# Patient Record
Sex: MALE | Race: WHITE | NOT HISPANIC OR LATINO | Employment: OTHER | URBAN - METROPOLITAN AREA
[De-identification: names, ages, dates, MRNs, and addresses within clinical notes are randomized per-mention and may not be internally consistent; named-entity substitution may affect disease eponyms.]

---

## 2017-01-26 LAB
ALBUMIN SERPL-MCNC: 4 G/DL (ref 3.5–4.7)
ALBUMIN/GLOB SERPL: 1.7 {RATIO} (ref 1.1–2.5)
ALP SERPL-CCNC: 70 IU/L (ref 39–117)
ALT SERPL-CCNC: 13 IU/L (ref 0–44)
AST SERPL-CCNC: 21 IU/L (ref 0–40)
BASOPHILS # BLD AUTO: 0 X10E3/UL (ref 0–0.2)
BASOPHILS NFR BLD AUTO: 0 %
BILIRUB SERPL-MCNC: 0.8 MG/DL (ref 0–1.2)
BUN SERPL-MCNC: 16 MG/DL (ref 8–27)
BUN/CREAT SERPL: 21 (ref 10–22)
CALCIUM SERPL-MCNC: 9.1 MG/DL (ref 8.6–10.2)
CHLORIDE SERPL-SCNC: 104 MMOL/L (ref 96–106)
CO2 SERPL-SCNC: 24 MMOL/L (ref 18–29)
CREAT SERPL-MCNC: 0.75 MG/DL (ref 0.76–1.27)
EOSINOPHIL # BLD AUTO: 0.1 X10E3/UL (ref 0–0.4)
EOSINOPHIL NFR BLD AUTO: 2 %
ERYTHROCYTE [DISTWIDTH] IN BLOOD BY AUTOMATED COUNT: 13.6 % (ref 12.3–15.4)
GLOBULIN SER CALC-MCNC: 2.4 G/DL (ref 1.5–4.5)
GLUCOSE SERPL-MCNC: 100 MG/DL (ref 65–99)
HBA1C MFR BLD: 5.8 % (ref 4.8–5.6)
HCT VFR BLD AUTO: 47.4 % (ref 37.5–51)
HGB BLD-MCNC: 15.3 G/DL (ref 12.6–17.7)
IMM GRANULOCYTES # BLD: 0 X10E3/UL (ref 0–0.1)
IMM GRANULOCYTES NFR BLD: 0 %
IMMATURE CELLS  115398: NORMAL
LYMPHOCYTES # BLD AUTO: 1.7 X10E3/UL (ref 0.7–3.1)
LYMPHOCYTES NFR BLD AUTO: 31 %
MCH RBC QN AUTO: 30.5 PG (ref 26.6–33)
MCHC RBC AUTO-ENTMCNC: 32.3 G/DL (ref 31.5–35.7)
MCV RBC AUTO: 94 FL (ref 79–97)
MONOCYTES # BLD AUTO: 0.7 X10E3/UL (ref 0.1–0.9)
MONOCYTES NFR BLD AUTO: 13 %
MORPHOLOGY BLD-IMP: NORMAL
NEUTROPHILS # BLD AUTO: 3 X10E3/UL (ref 1.4–7)
NEUTROPHILS NFR BLD AUTO: 54 %
NRBC BLD AUTO-RTO: NORMAL %
PLATELET # BLD AUTO: 167 X10E3/UL (ref 150–379)
POTASSIUM SERPL-SCNC: 4.3 MMOL/L (ref 3.5–5.2)
PROT SERPL-MCNC: 6.4 G/DL (ref 6–8.5)
RBC # BLD AUTO: 5.02 X10E6/UL (ref 4.14–5.8)
SODIUM SERPL-SCNC: 141 MMOL/L (ref 134–144)
WBC # BLD AUTO: 5.5 X10E3/UL (ref 3.4–10.8)

## 2017-02-06 ENCOUNTER — OFFICE VISIT (OUTPATIENT)
Dept: MEDICAL GROUP | Facility: MEDICAL CENTER | Age: 82
End: 2017-02-06
Payer: MEDICARE

## 2017-02-06 VITALS
HEIGHT: 69 IN | TEMPERATURE: 98.2 F | RESPIRATION RATE: 16 BRPM | HEART RATE: 95 BPM | SYSTOLIC BLOOD PRESSURE: 132 MMHG | DIASTOLIC BLOOD PRESSURE: 76 MMHG | WEIGHT: 157 LBS | BODY MASS INDEX: 23.25 KG/M2 | OXYGEN SATURATION: 93 %

## 2017-02-06 DIAGNOSIS — R73.02 IGT (IMPAIRED GLUCOSE TOLERANCE): ICD-10-CM

## 2017-02-06 DIAGNOSIS — C17.9 ADENOCARCINOMA OF SMALL BOWEL (HCC): ICD-10-CM

## 2017-02-06 DIAGNOSIS — E78.5 DYSLIPIDEMIA: ICD-10-CM

## 2017-02-06 PROCEDURE — 99214 OFFICE O/P EST MOD 30 MIN: CPT | Performed by: INTERNAL MEDICINE

## 2017-02-06 PROCEDURE — G8432 DEP SCR NOT DOC, RNG: HCPCS | Performed by: INTERNAL MEDICINE

## 2017-02-06 PROCEDURE — G8482 FLU IMMUNIZE ORDER/ADMIN: HCPCS | Performed by: INTERNAL MEDICINE

## 2017-02-06 PROCEDURE — 1101F PT FALLS ASSESS-DOCD LE1/YR: CPT | Performed by: INTERNAL MEDICINE

## 2017-02-06 PROCEDURE — 1036F TOBACCO NON-USER: CPT | Performed by: INTERNAL MEDICINE

## 2017-02-06 PROCEDURE — 4040F PNEUMOC VAC/ADMIN/RCVD: CPT | Performed by: INTERNAL MEDICINE

## 2017-02-06 PROCEDURE — G8420 CALC BMI NORM PARAMETERS: HCPCS | Performed by: INTERNAL MEDICINE

## 2017-02-06 ASSESSMENT — PATIENT HEALTH QUESTIONNAIRE - PHQ9: CLINICAL INTERPRETATION OF PHQ2 SCORE: 0

## 2017-02-06 NOTE — MR AVS SNAPSHOT
"        Donato Burciaga   2017 1:20 PM   Office Visit   MRN: [id number]    Department:  [address] [place] [address]   Dept Phone:  [phone]    Description:  Male : 1929   Provider:  Fabiano Burk M.D.           Reason for Visit     Follow-Up Labs      Allergies as of 2017     No Known Allergies      You were diagnosed with     Adenocarcinoma of small bowel (CMS-HCC)   [422439]       IGT (impaired glucose tolerance)   [239029]       Dyslipidemia   [051259]         Vital Signs     Blood Pressure Pulse Temperature Respirations Height Weight    132/76 mmHg 95 36.8 °C (98.2 °F) 16 1.753 m (5' 9\") 71.215 kg (157 lb)    Body Mass Index Oxygen Saturation Smoking Status             23.17 kg/m2 93% Former Smoker         Basic Information     Date Of Birth Sex Race Ethnicity Preferred Language    1929 Male White Non- English      Your appointments     2017  1:00 PM   Established Patient with Fabiano Burk M.D.   [facility] [address]    [address] [place] [address]  [facility] [address]   [phone]           You will be receiving a confirmation call a few days before your appointment from our automated call confirmation system.              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 Review all of your home medications and newly ordered medications with your provider and/or pharmacist. Follow medication instructions as directed by your provider and/or pharmacist. Please keep your medication list with you and share with your provider. Update the information when medications are discontinued, doses are changed, or new medications (including over-the-counter products) are added; and carry medication information at all times in the event of emergency situations     Allergies:  No Known Allergies          Medications  Valid as of: February 06, 2017 -  1:29 PM    Generic Name Brand Name Tablet Size Instructions for use    Ascorbic Acid (Chew Tab) Vitamin C 250 MG Take  by mouth.        Cholecalciferol (Cap) Vitamin D (Cholecalciferol) 1000 UNITS Take  by mouth.        Loratadine (Tab) CLARITIN 10 MG Take 10 mg by mouth every day.        Magnesium   Take  by mouth.        Metoprolol Succinate (TABLET SR 24 HR) TOPROL XL 50 MG Take 1 Tab by mouth every day.        Pravastatin Sodium (Tab) PRAVACHOL 20 MG Take 1 Tab by mouth every day.        Terazosin HCl (Cap) HYTRIN 5 MG Take 1 Cap by mouth every day. TAKE 1 CAP BY MOUTH EVERY DAY.        .                 Medicines prescribed today were sent to:     [id number] - [place], [place] - [address] [place] [address] AT [facility] & [address] [address]    Phone: [phone] Fax: [phone]    Open 24 Hours?: No    [facility] - [facility], [place] - [address] [facility]    [address] [facility] [address]    Phone: [phone] Fax: [phone]    Open 24 Hours?: No      Medication refill instructions:       If your prescription bottle indicates you have  medication refills left, it is not necessary to call your provider’s office. Please contact your pharmacy and they will refill your medication.    If your prescription bottle indicates you do not have any refills left, you may request refills at any time through one of the following ways: The online [employer] system (except Urgent Care), by calling your provider’s office, or by asking your pharmacy to contact your provider’s office with a refill request. Medication refills are processed only during regular business hours and may not be available until the next business day. Your provider may request additional information or to have a follow-up visit with you prior to refilling your medication.   *Please Note: Medication refills are assigned a new Rx number when refilled electronically. Your pharmacy may indicate that no refills were authorized even though a new prescription for the same medication is available at the pharmacy. Please request the medicine by name with the pharmacy before contacting your provider for a refill.        Your To Do List     Future Labs/Procedures Complete By Expires    COMP METABOLIC PANEL  As directed 2/7/2018    HEMOGLOBIN A1C  As directed 2/7/2018    LIPID PROFILE  As directed 2/7/2018         [employer] Status: Patient Declined

## 2017-02-06 NOTE — PROGRESS NOTES
"CC: Multiple issues    HPI:   Donato presents today with the following.    1. Adenocarcinoma of small bowel (CMS-HCC)  Status post resection of the cancer and the ampulla levator doing well from a clinical standpoint. He finished chemoradiation and his weight is stable denying nausea vomiting or abdominal pain.    2. IGT (impaired glucose tolerance)  Blood sugar slightly elevated on lab draw never been diabetic. Denying any symptoms of excessive thirst or urination.    3. Dyslipidemia  Mild cholesterol maintain on a statin. He will be coming due in November. Denies any myalgias.      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 90 Tab 3   • terazosin (HYTRIN) 5 MG Cap Take 1 Cap by mouth every day. TAKE 1 CAP BY MOUTH EVERY DAY. 90 Cap 3   • pravastatin (PRAVACHOL) 20 MG Tab Take 1 Tab by mouth every day. 90 Tab 3   • MAGNESIUM PO Take  by mouth.     • Ascorbic Acid (VITAMIN C) 250 MG Chew Tab Take  by mouth.     • Vitamin D, Cholecalciferol, 1000 UNITS Cap Take  by mouth.     • loratadine (CLARITIN) 10 MG Tab Take 10 mg by mouth every day.       No current facility-administered medications for this visit.         Allergies as of 02/06/2017   • (No Known Allergies)        ROS: As per HPI.    /76 mmHg  Pulse 95  Temp(Src) 36.8 °C (98.2 °F)  Resp 16  Ht 1.753 m (5' 9\")  Wt 71.215 kg (157 lb)  BMI 23.17 kg/m2  SpO2 93%    Physical Exam:  Gen:         Alert and oriented, " No apparent distress.  Neck:        No Lymphadenopathy or Bruits.  Lungs:     Clear to auscultation bilaterally  CV:          Regular rate and rhythm. No murmurs, rubs or gallops.               Ext:          No clubbing, cyanosis, edema.      Assessment and Plan.   87 y.o. male with the following issues.    1. Adenocarcinoma of small bowel (CMS-HCC)  Clinically doing well no further treatment plan will manage expectantly.    2. IGT (impaired glucose tolerance)  Discussion about diet recheck 8 months.  - HEMOGLOBIN A1C; Future    3. Dyslipidemia  Lipids currently well controlled. Discussed continued diet and exercise recheck 6 months to 1 year.  - COMP METABOLIC PANEL; Future  - LIPID PROFILE; Future

## 2017-03-09 ENCOUNTER — APPOINTMENT (OUTPATIENT)
Dept: RADIATION ONCOLOGY | Facility: MEDICAL CENTER | Age: 82
End: 2017-03-09
Attending: RADIOLOGY
Payer: MEDICARE

## 2017-04-12 ENCOUNTER — TELEPHONE (OUTPATIENT)
Dept: OTHER | Facility: MEDICAL CENTER | Age: 82
End: 2017-04-12

## 2017-04-12 NOTE — PROGRESS NOTES
Pt returns call. He does not have any upcoming appointments at [facility] soon and would prefer his cancer treatment summary / survivorship care plan mailed to him.

## 2017-04-19 ENCOUNTER — PATIENT OUTREACH (OUTPATIENT)
Dept: OTHER | Facility: MEDICAL CENTER | Age: 82
End: 2017-04-19

## 2017-04-19 NOTE — PROGRESS NOTES
Telephoned patient to review cancer treatment summary/survivorship care plan which was sent via mail. Pt stated he received it and read through it. He denied questions, needs, or barriers at this time.

## 2017-11-03 ENCOUNTER — TELEPHONE (OUTPATIENT)
Dept: MEDICAL GROUP | Facility: MEDICAL CENTER | Age: 82
End: 2017-11-03

## 2017-11-03 LAB
ALBUMIN SERPL-MCNC: 3.8 G/DL (ref 3.5–4.7)
ALBUMIN/GLOB SERPL: 1.8 {RATIO} (ref 1.2–2.2)
ALP SERPL-CCNC: 145 IU/L (ref 39–117)
ALT SERPL-CCNC: 100 IU/L (ref 0–44)
AST SERPL-CCNC: 46 IU/L (ref 0–40)
BILIRUB SERPL-MCNC: 0.9 MG/DL (ref 0–1.2)
BUN SERPL-MCNC: 17 MG/DL (ref 8–27)
BUN/CREAT SERPL: 25 (ref 10–24)
CALCIUM SERPL-MCNC: 9 MG/DL (ref 8.6–10.2)
CHLORIDE SERPL-SCNC: 104 MMOL/L (ref 96–106)
CHOLEST SERPL-MCNC: 147 MG/DL (ref 100–199)
CO2 SERPL-SCNC: 22 MMOL/L (ref 18–29)
COMMENT 011824: NORMAL
CREAT SERPL-MCNC: 0.69 MG/DL (ref 0.76–1.27)
GFR SERPLBLD CREATININE-BSD FMLA CKD-EPI: 85 ML/MIN/1.73
GFR SERPLBLD CREATININE-BSD FMLA CKD-EPI: 98 ML/MIN/1.73
GLOBULIN SER CALC-MCNC: 2.1 G/DL (ref 1.5–4.5)
GLUCOSE SERPL-MCNC: 104 MG/DL (ref 65–99)
HBA1C MFR BLD: 5.5 % (ref 4.8–5.6)
HDLC SERPL-MCNC: 58 MG/DL
LDLC SERPL CALC-MCNC: 76 MG/DL (ref 0–99)
POTASSIUM SERPL-SCNC: 4.3 MMOL/L (ref 3.5–5.2)
PROT SERPL-MCNC: 5.9 G/DL (ref 6–8.5)
SODIUM SERPL-SCNC: 141 MMOL/L (ref 134–144)
TRIGL SERPL-MCNC: 66 MG/DL (ref 0–149)
VLDLC SERPL CALC-MCNC: 13 MG/DL (ref 5–40)

## 2017-11-03 NOTE — TELEPHONE ENCOUNTER
Notes Recorded by Fabiano Burk M.D. on 11/3/2017 at 6:24 AM PDT  Need follow up to discuss liver tests

## 2017-11-09 ENCOUNTER — OFFICE VISIT (OUTPATIENT)
Dept: MEDICAL GROUP | Facility: MEDICAL CENTER | Age: 82
End: 2017-11-09
Payer: MEDICARE

## 2017-11-09 VITALS
TEMPERATURE: 98.4 F | OXYGEN SATURATION: 98 % | HEIGHT: 69 IN | WEIGHT: 144 LBS | RESPIRATION RATE: 16 BRPM | BODY MASS INDEX: 21.33 KG/M2 | HEART RATE: 93 BPM | DIASTOLIC BLOOD PRESSURE: 70 MMHG | SYSTOLIC BLOOD PRESSURE: 120 MMHG

## 2017-11-09 DIAGNOSIS — Z00.00 MEDICARE ANNUAL WELLNESS VISIT, SUBSEQUENT: ICD-10-CM

## 2017-11-09 DIAGNOSIS — E78.5 DYSLIPIDEMIA: ICD-10-CM

## 2017-11-09 DIAGNOSIS — R73.02 IGT (IMPAIRED GLUCOSE TOLERANCE): ICD-10-CM

## 2017-11-09 DIAGNOSIS — R79.89 LFTS ABNORMAL: ICD-10-CM

## 2017-11-09 DIAGNOSIS — I10 ESSENTIAL HYPERTENSION: ICD-10-CM

## 2017-11-09 DIAGNOSIS — Z85.09 HISTORY OF GALLBLADDER CANCER: ICD-10-CM

## 2017-11-09 DIAGNOSIS — N40.1 BENIGN NON-NODULAR PROSTATIC HYPERPLASIA WITH LOWER URINARY TRACT SYMPTOMS: ICD-10-CM

## 2017-11-09 DIAGNOSIS — R09.82 PND (POST-NASAL DRIP): ICD-10-CM

## 2017-11-09 DIAGNOSIS — G25.0 BENIGN ESSENTIAL TREMOR: ICD-10-CM

## 2017-11-09 PROCEDURE — 99214 OFFICE O/P EST MOD 30 MIN: CPT | Mod: 25 | Performed by: INTERNAL MEDICINE

## 2017-11-09 RX ORDER — FLUTICASONE PROPIONATE 50 MCG
1 SPRAY, SUSPENSION (ML) NASAL DAILY
Qty: 16 G | Refills: 11 | Status: SHIPPED | OUTPATIENT
Start: 2017-11-09 | End: 2017-11-27 | Stop reason: SDUPTHER

## 2017-11-09 ASSESSMENT — PATIENT HEALTH QUESTIONNAIRE - PHQ9: CLINICAL INTERPRETATION OF PHQ2 SCORE: 0

## 2017-11-09 NOTE — PROGRESS NOTES
CC: Follow-up blood work due for wellness examination.    HPI:   Donato presents today with the following.    1. Medicare annual wellness visit, subsequent  Screenings performed below information given on advanced directives    2. LFTs abnormal  Blood work recently obtained showing elevation of AST and ALT with values of 45 and 100 respectively also elevating bilirubin. Denies any abdominal pain or jaundice no loss of appetite.    3. History of gallbladder cancer  Lab work in the setting of history of cancer of the biliary tree status post radiation. He is not being followed by oncology no recent imaging.    4. PND (post-nasal drip)  He is complaining of postnasal drip no fevers or chills earaches or sore throat. Tends to be related to cold weather being outside. His report.    5. Dyslipidemia  Maintain on statin without myalgias    6. Essential hypertension  Blood pressures well controlled denying any chest pain or shortness breath no edema    7. Benign non-nodular prostatic hyperplasia with lower urinary tract symptoms  Maintain on medication symptoms stable.    8. IGT (impaired glucose tolerance)  Blood sugars checked and stable.    9. Benign essential tremor  Denying any advancement of symptoms.      Depression Screening    Little interest or pleasure in doing things?  0 - not at all  Feeling down, depressed , or hopeless? 0 - not at all  Patient Health Questionnaire Score: 0     If depressive symptoms identified deferred to follow up visit unless specifically addressed in assessment and plan.    Interpretation of PHQ-9 Total Score   Score Severity   1-4 No Depression   5-9 Mild Depression   10-14 Moderate Depression   15-19 Moderately Severe Depression   20-27 Severe Depression    Screening for Cognitive Impairment    Three Minute Recall (apple, watch, jad)  3/3    Draw clock face with all 12 numbers set to the hand to show 10 minutes past 11 o'clock       Cognitive concerns identified deferred for follow up  unless specifically addressed in assessment and plan.    Fall Risk Assessment    Has the patient had two or more falls in the last year or any fall with injury in the last year?  No    Safety Assessment    Throw rugs on floor.  No  Handrails on all stairs.  No  Good lighting in all hallways.  Yes  Difficulty hearing.  No  Patient counseled about all safety risks that were identified.    Functional Assessment ADLs    Are there any barriers preventing you from cooking for yourself or meeting nutritional needs?  No.    Are there any barriers preventing you from driving safely or obtaining transportation?  No.    Are there any barriers preventing you from using a telephone or calling for help?  No.    Are there any barriers preventing you from shopping?  No.    Are there any barriers preventing you from taking care of your own finances?  No.    Are there any barriers preventing you from managing your medications?  No.    Are currently engaging any exercise or physical activity?  Yes.       Health Maintenance Summary                Annual Wellness Visit Next Due 11/10/2018      Done 11/9/2017 Visit Dx: Medicare annual wellness visit, subsequent     Patient has more history with this topic...          Patient Care Team:  Fabiano Burk M.D. as PCP - General (Internal Medicine)  Fabiano Warner M.D. as [facility] (Oncology)  Dain Barrett M.D. as Consulting Physician (Radiation Therapy)  [place] M.D. as Consulting Physician (Gastroenterology)      Patient Active Problem List    Diagnosis Date Noted   • History of gallbladder cancer 11/09/2017   • Benign essential tremor 11/03/2016   • Anemia 02/09/2016   • IGT (impaired glucose tolerance) 11/30/2015   • Benign non-nodular prostatic hyperplasia with lower urinary tract symptoms 08/03/2015   • Dyslipidemia 01/26/2015   • Essential hypertension 01/26/2015       Current Outpatient Prescriptions   Medication Sig Dispense Refill   • fluticasone  "(FLONASE) 50 MCG/ACT nasal spray Spray 1 Spray in nose every day. 16 g 11   • terazosin (HYTRIN) 5 MG Cap TAKE 1 CAPSULE DAILY 90 Cap 3   • metoprolol SR (TOPROL XL) 50 MG TABLET SR 24 HR Take 1 Tab by mouth every day. 90 Tab 3   • pravastatin (PRAVACHOL) 20 MG Tab Take 1 Tab by mouth every day. 90 Tab 3   • MAGNESIUM PO Take  by mouth.     • Ascorbic Acid (VITAMIN C) 250 MG Chew Tab Take  by mouth.     • Vitamin D, Cholecalciferol, 1000 UNITS Cap Take  by mouth.     • loratadine (CLARITIN) 10 MG Tab Take 10 mg by mouth every day.       No current facility-administered medications for this visit.          Allergies as of 11/09/2017   • (No Known Allergies)        ROS: As per HPI.    /70   Pulse 93   Temp 36.9 °C (98.4 °F)   Resp 16   Ht 1.753 m (5' 9\")   Wt 65.3 kg (144 lb)   SpO2 98%   BMI 21.27 kg/m²     Physical Exam:  Gen:         Alert and oriented, No apparent distress.  Neck:        No Lymphadenopathy or Bruits.  Lungs:     Clear to auscultation bilaterally  CV:          Regular rate and rhythm. No murmurs, rubs or gallops.  Abd:         Soft non tender, non distended. Normal active bowel sounds.  No  Hepatosplenomegaly, No pulsatile masses.                   Ext:          No clubbing, cyanosis, edema.      Assessment and Plan.   88 y.o. male with the following issues.    1. Medicare annual wellness visit, subsequent  Discussed healthy lifestyle habits as well as screening regimens.  - Annual Wellness Visit - Includes PPPS Subsequent ()    2. LFTs abnormal  Certainly concerns given his previous cancer have written for ultrasound as well as referral back to gastroenterology.  - US-LIVER AND BILIARY TREE; Future  - REFERRAL TO GASTROENTEROLOGY    3. History of gallbladder cancer  As a #2.  - US-LIVER AND BILIARY TREE; Future  - REFERRAL TO GASTROENTEROLOGY    4. PND (post-nasal drip)  Placing on nasal steroid follow up if not improving  - fluticasone (FLONASE) 50 MCG/ACT nasal spray; Spray 1 " Spray in nose every day.  Dispense: 16 g; Refill: 11    5. Dyslipidemia  Lipids currently well controlled. Discussed continued diet and exercise recheck 6 months to 1 year.  - Annual Wellness Visit - Includes PPPS Subsequent ()    6. Essential hypertension  Currently well controlled, Discuss diet, exercise and salt restriction.  - Annual Wellness Visit - Includes PPPS Subsequent ()    7. Benign non-nodular prostatic hyperplasia with lower urinary tract symptoms  Stable no change therapy  - Annual Wellness Visit - Includes PPPS Subsequent ()    8. IGT (impaired glucose tolerance)  Blood sugars adequately controlled no change therapy  - Annual Wellness Visit - Includes PPPS Subsequent ()    9. Benign essential tremor  Clinically stable.  - Annual Wellness Visit - Includes PPPS Subsequent ()

## 2017-11-27 DIAGNOSIS — R09.82 PND (POST-NASAL DRIP): ICD-10-CM

## 2017-11-27 RX ORDER — FLUTICASONE PROPIONATE 50 MCG
1 SPRAY, SUSPENSION (ML) NASAL DAILY
Qty: 16 G | Refills: 11 | Status: SHIPPED | OUTPATIENT
Start: 2017-11-27 | End: 2017-12-01 | Stop reason: SDUPTHER

## 2017-11-28 ENCOUNTER — HOSPITAL ENCOUNTER (OUTPATIENT)
Dept: RADIOLOGY | Facility: MEDICAL CENTER | Age: 82
End: 2017-11-28
Attending: INTERNAL MEDICINE
Payer: MEDICARE

## 2017-11-28 DIAGNOSIS — Z85.09 HISTORY OF GALLBLADDER CANCER: ICD-10-CM

## 2017-11-28 DIAGNOSIS — R79.89 LFTS ABNORMAL: ICD-10-CM

## 2017-11-28 PROCEDURE — 76705 ECHO EXAM OF ABDOMEN: CPT

## 2017-12-01 DIAGNOSIS — R09.82 PND (POST-NASAL DRIP): ICD-10-CM

## 2017-12-01 RX ORDER — FLUTICASONE PROPIONATE 50 MCG
1 SPRAY, SUSPENSION (ML) NASAL DAILY
Qty: 3 BOTTLE | Refills: 3 | Status: SHIPPED | OUTPATIENT
Start: 2017-12-01 | End: 2018-04-16

## 2017-12-01 NOTE — TELEPHONE ENCOUNTER
Was the patient seen in the last year in this department? Yes     Does patient have an active prescription for medications requested? Yes     Received Request Via: Pharmacy     Requesting mail order instead of local

## 2017-12-06 ENCOUNTER — OFFICE VISIT (OUTPATIENT)
Dept: MEDICAL GROUP | Facility: MEDICAL CENTER | Age: 82
End: 2017-12-06
Payer: MEDICARE

## 2017-12-06 VITALS
DIASTOLIC BLOOD PRESSURE: 62 MMHG | BODY MASS INDEX: 21 KG/M2 | HEIGHT: 69 IN | WEIGHT: 141.8 LBS | OXYGEN SATURATION: 95 % | TEMPERATURE: 97.8 F | HEART RATE: 66 BPM | RESPIRATION RATE: 16 BRPM | SYSTOLIC BLOOD PRESSURE: 142 MMHG

## 2017-12-06 DIAGNOSIS — E78.2 MIXED HYPERLIPIDEMIA: ICD-10-CM

## 2017-12-06 DIAGNOSIS — I71.40 ABDOMINAL AORTIC ANEURYSM (AAA) WITHOUT RUPTURE (HCC): ICD-10-CM

## 2017-12-06 DIAGNOSIS — R79.89 LFTS ABNORMAL: ICD-10-CM

## 2017-12-06 DIAGNOSIS — I10 ESSENTIAL HYPERTENSION: ICD-10-CM

## 2017-12-06 PROCEDURE — 99214 OFFICE O/P EST MOD 30 MIN: CPT | Performed by: INTERNAL MEDICINE

## 2017-12-06 RX ORDER — PRAVASTATIN SODIUM 20 MG
20 TABLET ORAL
Qty: 90 TAB | Refills: 3 | Status: ON HOLD | OUTPATIENT
Start: 2017-12-06 | End: 2018-02-22

## 2017-12-06 RX ORDER — LOSARTAN POTASSIUM 25 MG/1
25 TABLET ORAL DAILY
Qty: 90 TAB | Refills: 3 | Status: ON HOLD | OUTPATIENT
Start: 2017-12-06 | End: 2018-02-22

## 2017-12-07 NOTE — PROGRESS NOTES
CC: Follow-up multiple issues    HPI:   Donato presents today with the following.    1. Abdominal aortic aneurysm (AAA) without rupture (CMS-HCC)  Presents after an ultrasound of the abdomen revealed an aortic aneurysm measured 2 x 8 x 3.3. Denies any abdominal pain and felt to be incidental finding.    2. Essential hypertension  Does have elevated blood pressure monitors at home he is at borderline control. Denying any chest pain or shortness of breath no dizziness.    3. LFTs abnormal  Ultrasound obtained for abnormal LFTs. Did show biliary stent in place. He does have decreased appetite and his weight is persistently going down. He is dropped over 10 pounds in the last year. Denies inon nausea vomiting but again decreased appetite      Patient Active Problem List    Diagnosis Date Noted   • LFTs abnormal 12/06/2017   • History of gallbladder cancer 11/09/2017   • Benign essential tremor 11/03/2016   • Anemia 02/09/2016   • IGT (impaired glucose tolerance) 11/30/2015   • Benign non-nodular prostatic hyperplasia with lower urinary tract symptoms 08/03/2015   • Dyslipidemia 01/26/2015   • Essential hypertension 01/26/2015       Current Outpatient Prescriptions   Medication Sig Dispense Refill   • losartan (COZAAR) 25 MG Tab Take 1 Tab by mouth every day. 90 Tab 3   • pravastatin (PRAVACHOL) 20 MG Tab Take 1 Tab by mouth every day. 90 Tab 3   • fluticasone (FLONASE) 50 MCG/ACT nasal spray Spray 1 Spray in nose every day. 3 Bottle 3   • TOPROL XL 50 MG TABLET SR 24 HR TAKE 1 TABLET DAILY 90 Tab 3   • terazosin (HYTRIN) 5 MG Cap TAKE 1 CAPSULE DAILY 90 Cap 3   • MAGNESIUM PO Take  by mouth.     • Vitamin D, Cholecalciferol, 1000 UNITS Cap Take  by mouth.     • loratadine (CLARITIN) 10 MG Tab Take 10 mg by mouth every day.       No current facility-administered medications for this visit.          Allergies as of 12/06/2017   • (No Known Allergies)        ROS: As per HPI.    /62   Pulse 66   Temp 36.6 °C (97.8  "°F)   Resp 16   Ht 1.753 m (5' 9\")   Wt 64.3 kg (141 lb 12.8 oz)   SpO2 95%   BMI 20.94 kg/m²     Physical Exam:  Gen:         Alert and oriented, No apparent distress.  Neck:        No Lymphadenopathy or Bruits.  Lungs:     Clear to auscultation bilaterally  CV:          Regular rate and rhythm. No murmurs, rubs or gallops.               Ext:          No clubbing, cyanosis, edema.      Assessment and Plan.   88 y.o. male with the following issues.    1. Abdominal aortic aneurysm (AAA) without rupture (CMS-HCC)  Unlikely to be life limiting process have referred to vascular medicine for surveillance.  - REFERRAL TO VASCULAR MEDICINE Reason for Referral? Established Vascular Disease    2. Essential hypertension  Adding losartan low-dose cautious about side effects.  - losartan (COZAAR) 25 MG Tab; Take 1 Tab by mouth every day.  Dispense: 90 Tab; Refill: 3    3. LFTs abnormal  Concerns about weight loss and abnormal imaging he's already been referred to gastroenterology is not yet called for an appointment. He will do so today.      "

## 2018-01-17 ENCOUNTER — OFFICE VISIT (OUTPATIENT)
Dept: MEDICAL GROUP | Facility: MEDICAL CENTER | Age: 83
End: 2018-01-17
Payer: MEDICARE

## 2018-01-17 VITALS
OXYGEN SATURATION: 97 % | DIASTOLIC BLOOD PRESSURE: 60 MMHG | TEMPERATURE: 97.8 F | WEIGHT: 139 LBS | BODY MASS INDEX: 20.59 KG/M2 | HEART RATE: 74 BPM | HEIGHT: 69 IN | RESPIRATION RATE: 16 BRPM | SYSTOLIC BLOOD PRESSURE: 122 MMHG

## 2018-01-17 DIAGNOSIS — R63.4 WEIGHT LOSS: ICD-10-CM

## 2018-01-17 PROCEDURE — 99213 OFFICE O/P EST LOW 20 MIN: CPT | Performed by: INTERNAL MEDICINE

## 2018-01-17 RX ORDER — MEGESTROL ACETATE 20 MG/1
20 TABLET ORAL DAILY
Qty: 30 TAB | Refills: 3 | Status: ON HOLD | OUTPATIENT
Start: 2018-01-17 | End: 2018-02-22

## 2018-01-17 NOTE — PROGRESS NOTES
"CC: Follow-up weight loss    HPI:   Donato presents today with the following.    1. Weight loss  Presents having lost another 2 pounds in the last one month. He reports his appetite is fine wife has other ideas. She appears to want to say something but is hesitant to do so in his presence. He denies any major complaints with the reports he is seeming more fatigue. He does finally have an appointment with gastroenterology in the coming weeks. He denies any abdominal pain did have one episode of gagging but nothing persistent per his report.      Patient Active Problem List    Diagnosis Date Noted   • LFTs abnormal 12/06/2017   • History of gallbladder cancer 11/09/2017   • Benign essential tremor 11/03/2016   • Anemia 02/09/2016   • IGT (impaired glucose tolerance) 11/30/2015   • Benign non-nodular prostatic hyperplasia with lower urinary tract symptoms 08/03/2015   • Dyslipidemia 01/26/2015   • Essential hypertension 01/26/2015       Current Outpatient Prescriptions   Medication Sig Dispense Refill   • megestrol (MEGACE) 20 MG Tab Take 1 Tab by mouth every day. 30 Tab 3   • losartan (COZAAR) 25 MG Tab Take 1 Tab by mouth every day. 90 Tab 3   • pravastatin (PRAVACHOL) 20 MG Tab Take 1 Tab by mouth every day. 90 Tab 3   • fluticasone (FLONASE) 50 MCG/ACT nasal spray Spray 1 Spray in nose every day. 3 Bottle 3   • TOPROL XL 50 MG TABLET SR 24 HR TAKE 1 TABLET DAILY 90 Tab 3   • terazosin (HYTRIN) 5 MG Cap TAKE 1 CAPSULE DAILY 90 Cap 3   • MAGNESIUM PO Take  by mouth.     • Vitamin D, Cholecalciferol, 1000 UNITS Cap Take  by mouth.     • loratadine (CLARITIN) 10 MG Tab Take 10 mg by mouth every day.       No current facility-administered medications for this visit.          Allergies as of 01/17/2018   • (No Known Allergies)        ROS: As per HPI.    /60   Pulse 74   Temp 36.6 °C (97.8 °F)   Resp 16   Ht 1.753 m (5' 9\")   Wt 63 kg (139 lb)   SpO2 97%   BMI 20.53 kg/m²     Physical Exam:  Gen:         Alert " and oriented, No apparent distress.  Neck:        No Lymphadenopathy or Bruits.  Lungs:     Clear to auscultation bilaterally  CV:          Regular rate and rhythm. No murmurs, rubs or gallops.               Ext:          No clubbing, cyanosis, edema.      Assessment and Plan.   88 y.o. male with the following issues.    1. Weight loss  There is something more to restore than he lets on to wife will come back for a visit states she wants to talk about some of his issues. Have started on appetite stimulant will see back after he seeing gastroenterology.  - megestrol (MEGACE) 20 MG Tab; Take 1 Tab by mouth every day.  Dispense: 30 Tab; Refill: 3

## 2018-02-19 ENCOUNTER — HOSPITAL ENCOUNTER (INPATIENT)
Facility: MEDICAL CENTER | Age: 83
LOS: 2 days | DRG: 251 | End: 2018-02-22
Attending: EMERGENCY MEDICINE | Admitting: HOSPITALIST
Payer: MEDICARE

## 2018-02-19 DIAGNOSIS — I21.4 NSTEMI (NON-ST ELEVATED MYOCARDIAL INFARCTION) (HCC): ICD-10-CM

## 2018-02-19 DIAGNOSIS — G25.0 BENIGN ESSENTIAL TREMOR: ICD-10-CM

## 2018-02-19 DIAGNOSIS — R55 NEAR SYNCOPE: ICD-10-CM

## 2018-02-19 DIAGNOSIS — Z85.09 HISTORY OF GALLBLADDER CANCER: ICD-10-CM

## 2018-02-19 LAB — EKG IMPRESSION: NORMAL

## 2018-02-19 PROCEDURE — 93005 ELECTROCARDIOGRAM TRACING: CPT | Performed by: EMERGENCY MEDICINE

## 2018-02-19 PROCEDURE — 99285 EMERGENCY DEPT VISIT HI MDM: CPT

## 2018-02-19 PROCEDURE — 36415 COLL VENOUS BLD VENIPUNCTURE: CPT

## 2018-02-19 ASSESSMENT — PAIN SCALES - WONG BAKER: WONGBAKER_NUMERICALRESPONSE: DOESN'T HURT AT ALL

## 2018-02-20 ENCOUNTER — APPOINTMENT (OUTPATIENT)
Dept: RADIOLOGY | Facility: MEDICAL CENTER | Age: 83
DRG: 251 | End: 2018-02-20
Attending: EMERGENCY MEDICINE
Payer: MEDICARE

## 2018-02-20 ENCOUNTER — RESOLUTE PROFESSIONAL BILLING HOSPITAL PROF FEE (OUTPATIENT)
Dept: HOSPITALIST | Facility: MEDICAL CENTER | Age: 83
End: 2018-02-20
Payer: MEDICARE

## 2018-02-20 ENCOUNTER — APPOINTMENT (OUTPATIENT)
Dept: RADIOLOGY | Facility: MEDICAL CENTER | Age: 83
DRG: 251 | End: 2018-02-20
Attending: INTERNAL MEDICINE
Payer: MEDICARE

## 2018-02-20 ENCOUNTER — APPOINTMENT (OUTPATIENT)
Dept: RADIOLOGY | Facility: MEDICAL CENTER | Age: 83
DRG: 251 | End: 2018-02-20
Attending: HOSPITALIST
Payer: MEDICARE

## 2018-02-20 PROBLEM — E86.0 DEHYDRATION: Status: ACTIVE | Noted: 2018-02-20

## 2018-02-20 PROBLEM — R79.89 ELEVATED TROPONIN: Status: ACTIVE | Noted: 2018-02-20

## 2018-02-20 PROBLEM — R55 NEAR SYNCOPE: Status: ACTIVE | Noted: 2018-02-20

## 2018-02-20 LAB
ALBUMIN SERPL BCP-MCNC: 3.4 G/DL (ref 3.2–4.9)
ALBUMIN/GLOB SERPL: 1.5 G/DL
ALP SERPL-CCNC: 240 U/L (ref 30–99)
ALT SERPL-CCNC: 134 U/L (ref 2–50)
ANION GAP SERPL CALC-SCNC: 11 MMOL/L (ref 0–11.9)
ANION GAP SERPL CALC-SCNC: 8 MMOL/L (ref 0–11.9)
APPEARANCE UR: CLEAR
APTT PPP: 123.4 SEC (ref 24.7–36)
AST SERPL-CCNC: 185 U/L (ref 12–45)
BACTERIA #/AREA URNS HPF: NEGATIVE /HPF
BASOPHILS # BLD AUTO: 0.1 % (ref 0–1.8)
BASOPHILS # BLD: 0.01 K/UL (ref 0–0.12)
BILIRUB SERPL-MCNC: 2.7 MG/DL (ref 0.1–1.5)
BILIRUB UR QL STRIP.AUTO: ABNORMAL
BUN SERPL-MCNC: 26 MG/DL (ref 8–22)
BUN SERPL-MCNC: 30 MG/DL (ref 8–22)
CALCIUM SERPL-MCNC: 8.4 MG/DL (ref 8.5–10.5)
CALCIUM SERPL-MCNC: 8.5 MG/DL (ref 8.5–10.5)
CHLORIDE SERPL-SCNC: 109 MMOL/L (ref 96–112)
CHLORIDE SERPL-SCNC: 110 MMOL/L (ref 96–112)
CO2 SERPL-SCNC: 19 MMOL/L (ref 20–33)
CO2 SERPL-SCNC: 20 MMOL/L (ref 20–33)
COLOR UR: ABNORMAL
CREAT SERPL-MCNC: 0.76 MG/DL (ref 0.5–1.4)
CREAT SERPL-MCNC: 0.94 MG/DL (ref 0.5–1.4)
CULTURE IF INDICATED INDCX: YES UA CULTURE
EKG IMPRESSION: NORMAL
EKG IMPRESSION: NORMAL
EOSINOPHIL # BLD AUTO: 0.01 K/UL (ref 0–0.51)
EOSINOPHIL NFR BLD: 0.1 % (ref 0–6.9)
EPI CELLS #/AREA URNS HPF: ABNORMAL /HPF
ERYTHROCYTE [DISTWIDTH] IN BLOOD BY AUTOMATED COUNT: 45.6 FL (ref 35.9–50)
ERYTHROCYTE [DISTWIDTH] IN BLOOD BY AUTOMATED COUNT: 48.6 FL (ref 35.9–50)
FLUAV RNA SPEC QL NAA+PROBE: NEGATIVE
FLUBV RNA SPEC QL NAA+PROBE: NEGATIVE
GLOBULIN SER CALC-MCNC: 2.2 G/DL (ref 1.9–3.5)
GLUCOSE SERPL-MCNC: 133 MG/DL (ref 65–99)
GLUCOSE SERPL-MCNC: 94 MG/DL (ref 65–99)
GLUCOSE UR STRIP.AUTO-MCNC: NEGATIVE MG/DL
HCT VFR BLD AUTO: 32.5 % (ref 42–52)
HCT VFR BLD AUTO: 32.8 % (ref 42–52)
HGB BLD-MCNC: 10.7 G/DL (ref 14–18)
HGB BLD-MCNC: 11.1 G/DL (ref 14–18)
HYALINE CASTS #/AREA URNS LPF: ABNORMAL /LPF
IMM GRANULOCYTES # BLD AUTO: 0.11 K/UL (ref 0–0.11)
IMM GRANULOCYTES NFR BLD AUTO: 1 % (ref 0–0.9)
INR PPP: 1.24 (ref 0.87–1.13)
KETONES UR STRIP.AUTO-MCNC: ABNORMAL MG/DL
LEUKOCYTE ESTERASE UR QL STRIP.AUTO: ABNORMAL
LV EJECT FRACT  99904: 65
LV EJECT FRACT MOD 2C 99903: 68.43
LV EJECT FRACT MOD 4C 99902: 64.24
LV EJECT FRACT MOD BP 99901: 65.99
LYMPHOCYTES # BLD AUTO: 0.14 K/UL (ref 1–4.8)
LYMPHOCYTES NFR BLD: 1.2 % (ref 22–41)
MCH RBC QN AUTO: 30.2 PG (ref 27–33)
MCH RBC QN AUTO: 30.4 PG (ref 27–33)
MCHC RBC AUTO-ENTMCNC: 32.9 G/DL (ref 33.7–35.3)
MCHC RBC AUTO-ENTMCNC: 33.8 G/DL (ref 33.7–35.3)
MCV RBC AUTO: 89.9 FL (ref 81.4–97.8)
MCV RBC AUTO: 91.8 FL (ref 81.4–97.8)
MICRO URNS: ABNORMAL
MONOCYTES # BLD AUTO: 0.5 K/UL (ref 0–0.85)
MONOCYTES NFR BLD AUTO: 4.4 % (ref 0–13.4)
NEUTROPHILS # BLD AUTO: 10.49 K/UL (ref 1.82–7.42)
NEUTROPHILS NFR BLD: 93.2 % (ref 44–72)
NITRITE UR QL STRIP.AUTO: NEGATIVE
NRBC # BLD AUTO: 0 K/UL
NRBC BLD-RTO: 0 /100 WBC
PH UR STRIP.AUTO: 6.5 [PH]
PLATELET # BLD AUTO: 143 K/UL (ref 164–446)
PLATELET # BLD AUTO: 152 K/UL (ref 164–446)
PMV BLD AUTO: 9.5 FL (ref 9–12.9)
PMV BLD AUTO: 9.6 FL (ref 9–12.9)
POTASSIUM SERPL-SCNC: 3.5 MMOL/L (ref 3.6–5.5)
POTASSIUM SERPL-SCNC: 3.7 MMOL/L (ref 3.6–5.5)
PROT SERPL-MCNC: 5.6 G/DL (ref 6–8.2)
PROT UR QL STRIP: NEGATIVE MG/DL
PROTHROMBIN TIME: 15.3 SEC (ref 12–14.6)
RBC # BLD AUTO: 3.54 M/UL (ref 4.7–6.1)
RBC # BLD AUTO: 3.65 M/UL (ref 4.7–6.1)
RBC # URNS HPF: ABNORMAL /HPF
RBC UR QL AUTO: NEGATIVE
RENAL EPI CELLS #/AREA URNS HPF: NEGATIVE /HPF
SODIUM SERPL-SCNC: 138 MMOL/L (ref 135–145)
SODIUM SERPL-SCNC: 139 MMOL/L (ref 135–145)
SP GR UR STRIP.AUTO: 1.02
TROPONIN I SERPL-MCNC: 0.15 NG/ML (ref 0–0.04)
TROPONIN I SERPL-MCNC: 2.35 NG/ML (ref 0–0.04)
TROPONIN I SERPL-MCNC: 3.93 NG/ML (ref 0–0.04)
TROPONIN I SERPL-MCNC: 4.11 NG/ML (ref 0–0.04)
TROPONIN I SERPL-MCNC: 4.77 NG/ML (ref 0–0.04)
TROPONIN I SERPL-MCNC: 4.98 NG/ML (ref 0–0.04)
TROPONIN I SERPL-MCNC: 5.63 NG/ML (ref 0–0.04)
UROBILINOGEN UR STRIP.AUTO-MCNC: 2 MG/DL
WBC # BLD AUTO: 11.3 K/UL (ref 4.8–10.8)
WBC # BLD AUTO: 19.2 K/UL (ref 4.8–10.8)
WBC #/AREA URNS HPF: ABNORMAL /HPF

## 2018-02-20 PROCEDURE — 80053 COMPREHEN METABOLIC PANEL: CPT

## 2018-02-20 PROCEDURE — A9270 NON-COVERED ITEM OR SERVICE: HCPCS | Performed by: HOSPITALIST

## 2018-02-20 PROCEDURE — 71046 X-RAY EXAM CHEST 2 VIEWS: CPT

## 2018-02-20 PROCEDURE — 99233 SBSQ HOSP IP/OBS HIGH 50: CPT | Performed by: HOSPITALIST

## 2018-02-20 PROCEDURE — 71045 X-RAY EXAM CHEST 1 VIEW: CPT

## 2018-02-20 PROCEDURE — 700105 HCHG RX REV CODE 258: Performed by: EMERGENCY MEDICINE

## 2018-02-20 PROCEDURE — 85730 THROMBOPLASTIN TIME PARTIAL: CPT

## 2018-02-20 PROCEDURE — 84484 ASSAY OF TROPONIN QUANT: CPT

## 2018-02-20 PROCEDURE — 99223 1ST HOSP IP/OBS HIGH 75: CPT | Mod: AI | Performed by: HOSPITALIST

## 2018-02-20 PROCEDURE — 76705 ECHO EXAM OF ABDOMEN: CPT

## 2018-02-20 PROCEDURE — 93306 TTE W/DOPPLER COMPLETE: CPT | Mod: 26 | Performed by: INTERNAL MEDICINE

## 2018-02-20 PROCEDURE — 36415 COLL VENOUS BLD VENIPUNCTURE: CPT

## 2018-02-20 PROCEDURE — 93306 TTE W/DOPPLER COMPLETE: CPT

## 2018-02-20 PROCEDURE — 81001 URINALYSIS AUTO W/SCOPE: CPT

## 2018-02-20 PROCEDURE — 700105 HCHG RX REV CODE 258: Performed by: INTERNAL MEDICINE

## 2018-02-20 PROCEDURE — 700102 HCHG RX REV CODE 250 W/ 637 OVERRIDE(OP): Performed by: HOSPITALIST

## 2018-02-20 PROCEDURE — 85025 COMPLETE CBC W/AUTO DIFF WBC: CPT

## 2018-02-20 PROCEDURE — 770020 HCHG ROOM/CARE - TELE (206)

## 2018-02-20 PROCEDURE — 85027 COMPLETE CBC AUTOMATED: CPT

## 2018-02-20 PROCEDURE — 700111 HCHG RX REV CODE 636 W/ 250 OVERRIDE (IP): Performed by: HOSPITALIST

## 2018-02-20 PROCEDURE — 93005 ELECTROCARDIOGRAM TRACING: CPT | Performed by: HOSPITALIST

## 2018-02-20 PROCEDURE — 80048 BASIC METABOLIC PNL TOTAL CA: CPT

## 2018-02-20 PROCEDURE — 93010 ELECTROCARDIOGRAM REPORT: CPT | Mod: 76 | Performed by: INTERNAL MEDICINE

## 2018-02-20 PROCEDURE — 87086 URINE CULTURE/COLONY COUNT: CPT

## 2018-02-20 PROCEDURE — 85610 PROTHROMBIN TIME: CPT

## 2018-02-20 PROCEDURE — 87502 INFLUENZA DNA AMP PROBE: CPT

## 2018-02-20 RX ORDER — METOPROLOL SUCCINATE 50 MG/1
50 TABLET, EXTENDED RELEASE ORAL
Status: DISCONTINUED | OUTPATIENT
Start: 2018-02-20 | End: 2018-02-20

## 2018-02-20 RX ORDER — AMOXICILLIN 250 MG
2 CAPSULE ORAL 2 TIMES DAILY
Status: DISCONTINUED | OUTPATIENT
Start: 2018-02-20 | End: 2018-02-22 | Stop reason: HOSPADM

## 2018-02-20 RX ORDER — TERAZOSIN 5 MG/1
5 CAPSULE ORAL TWICE DAILY
Status: DISCONTINUED | OUTPATIENT
Start: 2018-02-20 | End: 2018-02-22 | Stop reason: HOSPADM

## 2018-02-20 RX ORDER — POTASSIUM CHLORIDE 20 MEQ/1
20 TABLET, EXTENDED RELEASE ORAL DAILY
Status: DISCONTINUED | OUTPATIENT
Start: 2018-02-20 | End: 2018-02-22

## 2018-02-20 RX ORDER — HEPARIN SODIUM 1000 [USP'U]/ML
2600 INJECTION, SOLUTION INTRAVENOUS; SUBCUTANEOUS PRN
Status: DISCONTINUED | OUTPATIENT
Start: 2018-02-20 | End: 2018-02-21

## 2018-02-20 RX ORDER — BISACODYL 10 MG
10 SUPPOSITORY, RECTAL RECTAL
Status: DISCONTINUED | OUTPATIENT
Start: 2018-02-20 | End: 2018-02-22 | Stop reason: HOSPADM

## 2018-02-20 RX ORDER — SODIUM CHLORIDE 9 MG/ML
INJECTION, SOLUTION INTRAVENOUS CONTINUOUS
Status: DISCONTINUED | OUTPATIENT
Start: 2018-02-20 | End: 2018-02-21

## 2018-02-20 RX ORDER — SODIUM CHLORIDE 9 MG/ML
1000 INJECTION, SOLUTION INTRAVENOUS ONCE
Status: COMPLETED | OUTPATIENT
Start: 2018-02-20 | End: 2018-02-20

## 2018-02-20 RX ORDER — LOSARTAN POTASSIUM 25 MG/1
25 TABLET ORAL DAILY
Status: DISCONTINUED | OUTPATIENT
Start: 2018-02-20 | End: 2018-02-20

## 2018-02-20 RX ORDER — POLYETHYLENE GLYCOL 3350 17 G/17G
1 POWDER, FOR SOLUTION ORAL
Status: DISCONTINUED | OUTPATIENT
Start: 2018-02-20 | End: 2018-02-22 | Stop reason: HOSPADM

## 2018-02-20 RX ORDER — HEPARIN SODIUM 1000 [USP'U]/ML
5000 INJECTION, SOLUTION INTRAVENOUS; SUBCUTANEOUS ONCE
Status: COMPLETED | OUTPATIENT
Start: 2018-02-20 | End: 2018-02-20

## 2018-02-20 RX ADMIN — SODIUM CHLORIDE: 9 INJECTION, SOLUTION INTRAVENOUS at 21:58

## 2018-02-20 RX ADMIN — HEPARIN SODIUM 1050 UNITS/HR: 5000 INJECTION, SOLUTION INTRAVENOUS at 13:28

## 2018-02-20 RX ADMIN — TERAZOSIN HYDROCHLORIDE ANHYDROUS 5 MG: 5 CAPSULE ORAL at 21:04

## 2018-02-20 RX ADMIN — SODIUM CHLORIDE: 9 INJECTION, SOLUTION INTRAVENOUS at 12:18

## 2018-02-20 RX ADMIN — HEPARIN SODIUM 5000 UNITS: 1000 INJECTION, SOLUTION INTRAVENOUS; SUBCUTANEOUS at 13:27

## 2018-02-20 RX ADMIN — POTASSIUM CHLORIDE 20 MEQ: 1500 TABLET, EXTENDED RELEASE ORAL at 08:33

## 2018-02-20 RX ADMIN — ASPIRIN 325 MG: 325 TABLET, COATED ORAL at 08:35

## 2018-02-20 RX ADMIN — SODIUM CHLORIDE 1000 ML: 9 INJECTION, SOLUTION INTRAVENOUS at 00:30

## 2018-02-20 RX ADMIN — CEFTRIAXONE SODIUM 1 G: 10 INJECTION, POWDER, FOR SOLUTION INTRAVENOUS at 08:46

## 2018-02-20 ASSESSMENT — ENCOUNTER SYMPTOMS
PHOTOPHOBIA: 0
MYALGIAS: 0
WHEEZING: 0
ABDOMINAL PAIN: 0
NAUSEA: 0
SORE THROAT: 0
FOCAL WEAKNESS: 0
VOMITING: 0
SHORTNESS OF BREATH: 0
PALPITATIONS: 0
DIARRHEA: 0
DEPRESSION: 0
CHILLS: 0
FEVER: 0
DIZZINESS: 0
TINGLING: 0
COUGH: 0
HEADACHES: 0

## 2018-02-20 ASSESSMENT — COGNITIVE AND FUNCTIONAL STATUS - GENERAL
MOVING FROM LYING ON BACK TO SITTING ON SIDE OF FLAT BED: A LITTLE
SUGGESTED CMS G CODE MODIFIER MOBILITY: CK
MOVING TO AND FROM BED TO CHAIR: A LITTLE
MOBILITY SCORE: 19
STANDING UP FROM CHAIR USING ARMS: A LITTLE
DAILY ACTIVITIY SCORE: 24
CLIMB 3 TO 5 STEPS WITH RAILING: A LITTLE
WALKING IN HOSPITAL ROOM: A LITTLE
SUGGESTED CMS G CODE MODIFIER DAILY ACTIVITY: CH

## 2018-02-20 ASSESSMENT — PATIENT HEALTH QUESTIONNAIRE - PHQ9
SUM OF ALL RESPONSES TO PHQ QUESTIONS 1-9: 0
1. LITTLE INTEREST OR PLEASURE IN DOING THINGS: NOT AT ALL
SUM OF ALL RESPONSES TO PHQ9 QUESTIONS 1 AND 2: 0
2. FEELING DOWN, DEPRESSED, IRRITABLE, OR HOPELESS: NOT AT ALL

## 2018-02-20 ASSESSMENT — PAIN SCALES - GENERAL
PAINLEVEL_OUTOF10: 0

## 2018-02-20 ASSESSMENT — LIFESTYLE VARIABLES
ALCOHOL_USE: YES
HAVE YOU EVER FELT YOU SHOULD CUT DOWN ON YOUR DRINKING: NO
CONSUMPTION TOTAL: NEGATIVE
HAVE PEOPLE ANNOYED YOU BY CRITICIZING YOUR DRINKING: NO
TOTAL SCORE: 0
EVER FELT BAD OR GUILTY ABOUT YOUR DRINKING: NO
EVER_SMOKED: YES
AVERAGE NUMBER OF DAYS PER WEEK YOU HAVE A DRINK CONTAINING ALCOHOL: 1
EVER HAD A DRINK FIRST THING IN THE MORNING TO STEADY YOUR NERVES TO GET RID OF A HANGOVER: NO
ON A TYPICAL DAY WHEN YOU DRINK ALCOHOL HOW MANY DRINKS DO YOU HAVE: 1
TOTAL SCORE: 0
HOW MANY TIMES IN THE PAST YEAR HAVE YOU HAD 5 OR MORE DRINKS IN A DAY: 0
TOTAL SCORE: 0

## 2018-02-20 NOTE — ASSESSMENT & PLAN NOTE
I'm holding the patient's home statin for now in light of his elevated LFTs, if they begin to trend down and we will consider repeat starting his medications.  Start fish oil daily.

## 2018-02-20 NOTE — CONSULTS
"Reason of Consult: Elevated troponin    Consulting Physician: Dr. Deutsch    HPI:  88M with HTN and Hyperlipidemia but no DM presents after becoming lightheaded yesterday evening. Associated with malaise and fatigue as well as tremors but no chest pain or pressure. Occurred when rising from seated position but persisted without regard to posture. In ED EKG revealed dynamic TWI and a troponin that has increased from 0.15 to 5.63. Feeling well now, ambulatory.     Denies any other cardiovascular symptoms including chest pain, shortness of breath, dyspnea on exertion, lower extremity edema, PND, orthopnea or palpitations.      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     Social History Main Topics   • Smoking status: Former Smoker     Years: 0.00     Types: Cigarettes     Quit date: 1/1/1960   • Smokeless tobacco: Never Used   • Alcohol use 1.8 oz/week     3 Glasses of wine per week      Comment: 1 per day   • Drug use: No   • Sexual activity: Not Currently     Partners: Female     Other Topics Concern   • Not on file     Social History Narrative   • No narrative on file       No current facility-administered medications on file prior to encounter.      Current Outpatient Prescriptions on File Prior to Encounter   Medication Sig Dispense Refill   • megestrol (MEGACE) 20 MG Tab Take 1 Tab by mouth every day. 30 Tab 3   • losartan (COZAAR) 25 MG Tab Take 1 Tab by mouth every day. 90 " "Tab 3   • pravastatin (PRAVACHOL) 20 MG Tab Take 1 Tab by mouth every day. 90 Tab 3   • fluticasone (FLONASE) 50 MCG/ACT nasal spray Spray 1 Spray in nose every day. 3 Bottle 3   • TOPROL XL 50 MG TABLET SR 24 HR TAKE 1 TABLET DAILY 90 Tab 3   • terazosin (HYTRIN) 5 MG Cap TAKE 1 CAPSULE DAILY 90 Cap 3   • MAGNESIUM PO Take  by mouth.     • Vitamin D, Cholecalciferol, 1000 UNITS Cap Take  by mouth.     • loratadine (CLARITIN) 10 MG Tab Take 10 mg by mouth every day.         Current Facility-Administered Medications   Medication Dose Frequency Provider Last Rate Last Dose   • terazosin (HYTRIN) capsule 5 mg  5 mg TWICE DAILY Kaitlin Gutiérrez M.D.   Stopped at 02/20/18 0900   • senna-docusate (PERICOLACE or SENOKOT S) 8.6-50 MG per tablet 2 Tab  2 Tab BID Kaitlin Gutiérrez M.D.        And   • polyethylene glycol/lytes (MIRALAX) PACKET 1 Packet  1 Packet QDAY PRN Kaitlin Gutiérrez M.D.        And   • magnesium hydroxide (MILK OF MAGNESIA) suspension 30 mL  30 mL QDAY PRN Kaitlin Gutiérrez M.D.        And   • bisacodyl (DULCOLAX) suppository 10 mg  10 mg QDAY PRN Kaitlin Gutiérrez M.D.       • aspirin EC (ECOTRIN) tablet 325 mg  325 mg DAILY Kaitlin Gutiérrez M.D.   325 mg at 02/20/18 0835   • potassium chloride SA (Kdur) tablet 20 mEq  20 mEq DAILY Arianna Deutsch M.D.   20 mEq at 02/20/18 0833   • cefTRIAXone (ROCEPHIN) syringe 1 g  1 g Q24HRS Arianna Deutsch M.D.   1 g at 02/20/18 0846     Last reviewed on 1/17/2018  1:28 PM by Fabiano Burk M.D.    Patient has no known allergies.    Family History   Problem Relation Age of Onset   • No Known Problems Mother        ROS: As per HPI all other systems reviewed and negative     Physical Exam   Blood pressure (!) 91/42, pulse 72, temperature 36.8 °C (98.2 °F), resp. rate 18, height 1.753 m (5' 9.02\"), weight 63 kg (139 lb), SpO2 93 %.    Constitutional: Elderly. Appears well-developed.   HENT: Normocephalic and atraumatic. No scleral icterus.   Neck: No JVD present.   Cardiovascular: " Normal rate. Exam reveals no gallop and no friction rub. No murmur heard.   Pulmonary/Chest: CTAB    Abdominal: S/NT/ND BS+   Musculoskeletal:  Pulses present. No atrophy. Strength normal.  Extremities: Exhibits no edema. No clubbing or cyanosis.   Skin: Skin is warm and dry.   Neuro: Non-focal, CN 2-12 intact grossly      Intake/Output Summary (Last 24 hours) at 02/20/18 1128  Last data filed at 02/20/18 0636   Gross per 24 hour   Intake                0 ml   Output              230 ml   Net             -230 ml       Recent Labs      02/20/18   0007   WBC  11.3*   RBC  3.65*   HEMOGLOBIN  11.1*   HEMATOCRIT  32.8*   MCV  89.9   MCH  30.4   MCHC  33.8   RDW  45.6   PLATELETCT  152*   MPV  9.6     Recent Labs      02/20/18   0007   SODIUM  139   POTASSIUM  3.5*   CHLORIDE  109   CO2  19*   GLUCOSE  133*   BUN  30*   CREATININE  0.94   CALCIUM  8.5             Recent Labs      02/20/18   0253  02/20/18   0649  02/20/18   1042   TROPONINI  2.35*  4.98*  5.63*           EKG (2/20/2018):  I have personally reviewed the EKG this visit and discussed with the patient. It shows SR inferior ST depression and TWI that improved on repeat.    ABD US (2/20/2018):  1.  The liver is enlarged and heterogenously echogenic.  Differential diagnosis includes most likely hepatic steatosis versus other diffuse hepatocellular disease.  2.  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  3.  Dilated pancreatic duct.  4.  Abdominal aortic aneurysm is again seen.    Imaging reviewed    Impressions:  1. NSTEMI  2. Presyncope  3. HTN  4. HLP  5. AAA  6. GB carcinoma s/p resection remotely  7. Biliary stent  8. LFT abnormalities, chronic  9. Possible UTI, borderline UA findings    Recommendations:  Diffuse historian but clear NSTEMI with EKG changes as well.   Discussed conservative vs. Invasive therapy. Together we have decided on an upfront invasive evaluation with possible PCI and ongoing medical therapy for  his other issues.   ASA, heparin gtt, ongoing therapy liver/UTI  Cor angio with possible PCI  Statin.  Bbeta blocker, ACE-I held due to hypotension    I have discussed the risks and benefits of cardiac catheterization as well as the procedure itself, rationale and appropriateness in detail with the patient today. Complications including but not limited to death, stroke, MI, urgent bypass surgery, contrast nephropathy, vascular complications, bleeding and infection were explained to the patient. The potential outcomes associated with the procedure (possible PCI, possible CABG, possible medical Rx only) were also discussed at length. The patient agrees to proceed.    Thank you for this interesting consultation. It was my pleasure to see Donato Burciaga today.    Tony Rodas MD, FACC, [facility]  Division of Interventional Cardiology  [facility]

## 2018-02-20 NOTE — PROGRESS NOTES
Dr. Gutiérrez called back about troponin. Dr. Gutiérrez putting in orders for a follow up EKG and change troponin draws from Q6 to Q4.

## 2018-02-20 NOTE — CARE PLAN
Problem: Communication  Goal: The ability to communicate needs accurately and effectively will improve  Outcome: PROGRESSING AS EXPECTED      Problem: Safety  Goal: Will remain free from injury  Outcome: PROGRESSING AS EXPECTED  Patient educated on use of call light. Patient verbalizes understanding. Bed locked and in lowest position. Bed alarm on.

## 2018-02-20 NOTE — PROGRESS NOTES
Dr. Muniz updated on patient's troponin increase from 0.15 to 2.35. Patient is asymptomatic. Dr. Muniz to consult Dr. Gutiérrez and call back.

## 2018-02-20 NOTE — H&P
" Hospital Medicine History and Physical    Date of Service  2/20/2018    Chief Complaint  Chief Complaint   Patient presents with   • Near Syncopal       History of Presenting Illness  88 y.o. male who presented on 2/19/2018 with dizziness. Patient denies that he was anywhere close to passing out. He states that he had been sitting for quite a long time and was watching television when he got up and became dizzy. She reports that it lasted for a few minutes but his wife is at bedside states that it lasted for closer to 20 minutes. He denies any focal deficits, he's had no paresthesias. His wife called the ambulance because she was concerned when he began shaking. He denies any chest pains, shortness of breath, and that he has been eating well. He was recently started on Megace. His wife reports that he has not been drinking liquids well at all. He denies any fevers, chills, nausea or vomiting. He has occasional rhinorrhea but has had no recent URIs. He denies any diarrhea, dysuria, or abdominal pain.      Primary Care Physician  Fabiano Burk M.D.    Consultants  None    Code Status  Fall    Review of Systems  Review of Systems   Constitutional: Negative for chills and fever.   HENT: Negative for congestion and sore throat.    Eyes: Negative for photophobia.   Respiratory: Negative for cough, shortness of breath and wheezing.    Cardiovascular: Negative for chest pain and palpitations.   Gastrointestinal: Negative for abdominal pain, diarrhea, nausea and vomiting.   Genitourinary: Negative for dysuria.   Musculoskeletal: Negative for myalgias.   Skin: Negative.    Neurological: Negative for dizziness, tingling, focal weakness and headaches.   Psychiatric/Behavioral: Negative for depression and suicidal ideas.        Past Medical History  Past Medical History:   Diagnosis Date   • Alcohol use 2016    1 per day   • Hyperlipidemia, mixed 1990   • Arthritis    • Cancer (CMS-HCC)     \"in bile duct\" treated with " radiology and chemo   • Cataract    • Dental disorder     dentures- full set   • High cholesterol    • History of chemotherapy last dose 9/2016   • History of radiation therapy last done on 09/28/2016   • Hypertension    • Snoring        Surgical History  Past Surgical History:   Procedure Laterality Date   • ERCP W/REM FB AND/OR STENT CHG N/A 12/5/2016    Procedure: ERCP W/REM FB AND/OR STENT CHG - PYLORIC STENT REMOVAL SWAP METAL STENT PLACEMENT;  Surgeon: Sunny Grove M.D.;  Location: [facility];  Service:    • GASTROSCOPY-ENDO N/A 7/11/2016    Procedure: GASTROSCOPY-ENDO;  Surgeon: Sunny Grove M.D.;  Location: [facility];  Service:    • EGD W/ENDOSCOPIC ULTRASOUND N/A 7/11/2016    Procedure: EGD W/ENDOSCOPIC ULTRASOUND - UPPER LINEAR;  Surgeon: Sunny Grove M.D.;  Location: [facility];  Service:    • ERCP W/REM FB AND/OR STENT CHG N/A 7/11/2016    Procedure: ERCP W/REM FB AND/OR STENT CHG - STENT EXCHANGE;  Surgeon: Sunny Grove M.D.;  Location: [facility];  Service:    • DIAMANTE BY LAPAROSCOPY  2/10/2016    Procedure: DIAMANTE BY LAPAROSCOPY;  Surgeon: Elver Turcios M.D.;  Location: [facility];  Service:    • ERCP IN OR N/A 2/8/2016    Procedure: ERCP IN OR;  Surgeon: Sunny Grove M.D.;  Location: [facility];  Service:        Medications  No current facility-administered medications on file prior to encounter.      Current Outpatient Prescriptions on File Prior to Encounter   Medication Sig Dispense Refill   • megestrol (MEGACE) 20 MG Tab Take 1 Tab by mouth every day. 30 Tab 3   • losartan (COZAAR) 25 MG Tab Take 1 Tab by mouth every day. 90 Tab 3   • pravastatin (PRAVACHOL) 20 MG Tab Take 1 Tab by mouth every day. 90 Tab 3   • fluticasone (FLONASE) 50 MCG/ACT nasal spray Spray 1 Spray in nose every day. 3 Bottle 3   • TOPROL XL 50 MG TABLET SR 24 HR TAKE 1 TABLET DAILY 90 Tab 3   • terazosin (HYTRIN) 5 MG Cap TAKE 1 CAPSULE DAILY 90  Cap 3   • MAGNESIUM PO Take  by mouth.     • Vitamin D, Cholecalciferol, 1000 UNITS Cap Take  by mouth.     • loratadine (CLARITIN) 10 MG Tab Take 10 mg by mouth every day.         Family History  Family History   Problem Relation Age of Onset   • No Known Problems Mother        Social History  Social History   Substance Use Topics   • Smoking status: Former Smoker     Years: 0.00     Types: Cigarettes     Quit date: 1960   • Smokeless tobacco: Never Used   • Alcohol use 1.8 oz/week     3 Glasses of wine per week      Comment: 1 per day       Allergies  No Known Allergies     Physical Exam  Laboratory   Hemodynamics  Temp (24hrs), Av.4 °C (99.4 °F), Min:37.4 °C (99.4 °F), Max:37.4 °C (99.4 °F)   Temperature: 37.4 °C (99.4 °F)  Pulse  Av.6  Min: 95  Max: 121 Heart Rate (Monitored): 95  Blood Pressure : 119/92, NIBP: 108/54      Respiratory      Respiration: 16, Pulse Oximetry: 91 %             Physical Exam   Constitutional: He is oriented to person, place, and time. No distress.   Elderly, frail   HENT:   Head: Normocephalic and atraumatic.   Right Ear: External ear normal.   Left Ear: External ear normal.   Eyes: EOM are normal. Right eye exhibits no discharge. Left eye exhibits no discharge.   Neck: Neck supple. No JVD present.   Cardiovascular: Normal rate, regular rhythm and normal heart sounds.    Pulmonary/Chest: Effort normal and breath sounds normal. No respiratory distress. He exhibits no tenderness.   Abdominal: Soft. Bowel sounds are normal. He exhibits no distension. There is no tenderness.   Musculoskeletal: He exhibits no edema.   Neurological: He is alert and oriented to person, place, and time. No cranial nerve deficit.   Skin: Skin is dry. He is not diaphoretic. No erythema.   Psychiatric: He has a normal mood and affect. His behavior is normal.   Nursing note and vitals reviewed.    Capillary refill less than 3 seconds, distal pulses intact    Recent Labs      18   0007   WBC   11.3*   RBC  3.65*   HEMOGLOBIN  11.1*   HEMATOCRIT  32.8*   MCV  89.9   MCH  30.4   MCHC  33.8   RDW  45.6   PLATELETCT  152*   MPV  9.6     Recent Labs      02/20/18   0007   SODIUM  139   POTASSIUM  3.5*   CHLORIDE  109   CO2  19*   GLUCOSE  133*   BUN  30*   CREATININE  0.94   CALCIUM  8.5     Recent Labs      02/20/18   0007   ALTSGPT  134*   ASTSGOT  185*   ALKPHOSPHAT  240*   TBILIRUBIN  2.7*   GLUCOSE  133*                 Lab Results   Component Value Date    TROPONINI 0.15 (H) 02/20/2018       Imaging  Dx-chest-portable (1 View)    Result Date: 2/20/2018 2/20/2018 12:03 AM HISTORY/REASON FOR EXAM:  Syncopal episode TECHNIQUE/EXAM DESCRIPTION AND NUMBER OF VIEWS: Single portable view of the chest. COMPARISON: 2/7/2016 FINDINGS: The mediastinal and cardiac silhouette is unremarkable. There is atherosclerosis of the aorta. There is linear scarring or atelectasis in the left lower lobe. There is probably scarring in the right upper lobe with some overlying skin folds. There is no significant pleural effusion. There is no visible pneumothorax. There are no acute bony abnormalities.     1.  There is no acute cardiopulmonary process.     Assessment/Plan     Anticipate that patient will need less than 2 midnights for management of the discussed medical issues.    This dictation was created using voice recognition software. The accuracy of the dictation is limited to the abilities of the software. I expect there may be some errors of grammar and possibly content.    * Near syncope   Assessment & Plan    From the patient's history, it does appear to be orthostatic in nature likely worsened by dehydration. He'll be admitted to the telemetry floor for monitoring of evidence of cardiac dysrhythmias. I will trend troponin levels. I will check orthostatic blood pressures and give him gentle IV fluids. He has no focal deficits therefore I'm holding off on any additional scans at this time however should he have repeat of  this episode, then we will consider a CT scan of the head.        Elevated troponin   Assessment & Plan    Patient has no chest pains, he has no ST changes on his EKG by my read. I will continue to trend his troponin levels and monitor him on telemetry.        LFTs abnormal- (present on admission)   Assessment & Plan    Patient has a known history of gallbladder cancer status post resection and chronically elevated LFTs. His AST and AST are not significantly changed from his last available labs however he has developed hyperbilirubinemia. He had a stent placed in his liver one and a half years ago. He has not yet followed up with GI since. He denies any abdominal pain. I will repeat an abdominal ultrasound to see if there is any new underlying disease. I will check a hepatitis panel. He will receive gentle fluids and will repeat chemistries in the morning.        Dyslipidemia- (present on admission)   Assessment & Plan    I'm holding the patient's home statin for now in light of his elevated LFTs, if they begin to trend down and we will consider repeat starting his medications.          Prophylaxis: Sequential compression devices for DVT prophylaxis, no PPI indicated, stool softeners ordered

## 2018-02-20 NOTE — PROGRESS NOTES
Report received from ED nurse. Patient up to floor with belongings. Monitor room called. Patient in SR at a rate of 89. Patient encouraged to call before getting up. Call light within reach. Bed locked and in lowest position. Bed alarm on.

## 2018-02-20 NOTE — ED PROVIDER NOTES
ED Provider Note    Scribed for Colleen Cheung M.D. by Oliver Armstrong. 2/19/2018, 11:59 PM.    Primary care provider: Fabiano Burk M.D.  Means of arrival: Ambulance  History obtained from: Patient  History limited by: None    CHIEF COMPLAINT  Chief Complaint   Patient presents with   • Near Syncopal     HPI  Donato Bc Burciaga is a 88 y.o. male who presents to the Emergency Department complaining of a near syncopal episode earlier today. The patient said he was watching TV for extended period and felt sudden dizziness upon getting up. He walked around for a few minutes and the issue persisted until he sat back down. Currently, he is only complaining of dehydration and very mild congestion but otherwise feels completely fine. Patient adds that he has an appointment with his PCP tomorrow in the afternoon. Denies vision changes, unilateral weakness, ear ringing, bowel or urinary issues, fever, nausea, vomiting, abdominal pain. Denies blood thinner use. Patient lives at home with his 92 yo wife.    REVIEW OF SYSTEMS  See HPI for further details. All other systems are negative.    C.    PAST MEDICAL HISTORY   has a past medical history of Alcohol use (2016); Arthritis; Cancer (CMS-HCC); Cataract; Dental disorder; High cholesterol; History of chemotherapy (last dose 9/2016); History of radiation therapy (last done on 09/28/2016); Hyperlipidemia, mixed (1990); Hypertension; and Snoring.    SURGICAL HISTORY   has a past surgical history that includes ercp in or (N/A, 2/8/2016); florinda by laparoscopy (2/10/2016); gastroscopy-endo (N/A, 7/11/2016); egd w/endoscopic ultrasound (N/A, 7/11/2016); ercp w/rem fb and/or stent chg (N/A, 7/11/2016); and ercp w/rem fb and/or stent chg (N/A, 12/5/2016).    SOCIAL HISTORY  Social History   Substance Use Topics   • Smoking status: Former Smoker     Years: 0.00     Types: Cigarettes     Quit date: 1/1/1960   • Smokeless tobacco: Never Used   • Alcohol use 1.8 oz/week     3  "Glasses of wine per week      Comment: 1 per day      History   Drug Use No     FAMILY HISTORY  Family History   Problem Relation Age of Onset   • No Known Problems Mother      CURRENT MEDICATIONS  Reviewed. See Encounter Summary.     ALLERGIES  No Known Allergies    PHYSICAL EXAM  VITAL SIGNS: /92   Pulse (!) 115   Temp 37.4 °C (99.4 °F)   Resp 18   Ht 1.753 m (5' 9\")   Wt 63 kg (139 lb)   SpO2 92%   BMI 20.53 kg/m²    Pulse ox interpretation: I interpret this pulse ox as normal.  Constitutional: Alert in no apparent distress.  HENT: Normocephalic atraumatic, DMM  Eyes: PERR, Conjunctiva normal, Non-icteric.   Neck: Normal range of motion, No tenderness, Supple, No stridor.   Lymphatic: No lymphadenopathy noted.   Cardiovascular: Tachycardic rate and regular rhythm, no murmurs.   Thorax & Lungs: Normal breath sounds, No respiratory distress, No wheezing, No chest tenderness.   Abdomen: Bowel sounds normal, Soft, No tenderness, No pulsatile masses. No peritoneal signs.  Skin: Warm, Dry, No erythema, No rash.   Back: No bony tenderness, No CVA tenderness.   Extremities: Intact distal pulses, No edema, No tenderness, No cyanosis  Musculoskeletal: Good range of motion in all major joints. No tenderness to palpation or major deformities noted.   Neurologic: Alert and oriented, No focal deficits noted. Symmetric smile, eyes shut tight bilaterally, forehead wrinkles bilaterally, sensation intact to light touch bilateral face, tongue midline, head turn and shoulder shrug with full strength. Hearing intact grossly bilaterally. 5/5  strength bilaterally, 5/5 tricep and bicep strength bilaterally. Sensation intact to light touch r, m, u, axillary nerves bilaterally. 5/5 strength quadricep, plantarflexion/dorsiflexion/extensor hallicus longus bilaterally. Sensation intact to light touch bilateral lower extremities in all nerve distributions.    DIFFERENTIAL DIAGNOSIS AND WORK UP PLAN    11:59 PM Patient seen " and examined at bedside. The patient presents with dizziness and the differential diagnosis includes but is not limited to Orthostasis, Dehydration, Acute Renal Injury. He has a history of AAA but is not complaining of abdominal pain and has no lower extremity weakness. Ordered for DX-Chest, Influenza Rapid, CBC, CMP, Troponin, Urinalysis to evaluate. Patient will be treated with IV fluids for hydration.     DIAGNOSTIC STUDIES / PROCEDURES     LABS  CBC with a mildly elevated white blood cell count with a left shift, also mild anemia CMP consistent with an acidosis and an elevated BUN with probable mild dehydration and elevated LFTs slightly increased from prior, troponin is detectable at 0.15 influenza negative  All labs were reviewed by me.    EKG  12- Lead EKG; interpreted by myself - Skye  Sinus tachycardia with a rate of 113 bpm.   Normal axis.  Normal intervals.   No ST elevation or depression, or abnormal T wave inversion   LVH  Unchanged from prior EKG  Clinical Impression: Tachycardic at a rate of 113.     RADIOLOGY  DX-CHEST-PORTABLE (1 VIEW)   Final Result      1.  There is no acute cardiopulmonary process.      US-LIVER AND BILIARY TREE    (Results Pending)     The radiologist's interpretation of all radiological studies have been reviewed by me.    COURSE & MEDICAL DECISION MAKING  Pertinent Labs & Imaging studies reviewed. (See chart for details)    1:00 AM - The current lab results indicate an elevated troponin of 0.15.    1:04 AM - Paged Hospitalist.    1:29 AM - Re-Paged Hospitalist.    1:34 AM - Consulted with Dr. uGtiérrez, Hospitalist, who is aware of the patient and agrees to admit the patient into their care.    1:47 AM - Patient seen at bedside. I discussed the current lab results and explained that he will be admitted for further evaluation. The patient understands and is agreeable.    This is a 88 y.o. year old male who presents with near syncopal event and dizziness the last 5-7 minutes with  "mildly elevated troponin EKG without signs of overt STEMI but he does have probably an unsteady on be admitted to the hospital for further evaluation and management will be given aspirin by the admitting physician and strokes will be appended and possible cardiology consult as needed. I discussed the case with the family and the patient may feel comfortable for the plan of admission    /54   Pulse 95   Temp 37.4 °C (99.4 °F)   Resp 16   Ht 1.753 m (5' 9.02\")   Wt 63 kg (139 lb)   SpO2 91%   BMI 20.52 kg/m²       DISPOSITION:  Patient will be admitted to Dr. Gutiérrez, Hospitalist, in guarded condition.    FINAL IMPRESSION  1. Dizziness  2. Near syncope  3. NSTEMI        IOliver (Scribe), am scribing for, and in the presence of, Colleen Cheung M.D..    Electronically signed by: Oliver Armstrong (Scribe), 2/19/2018    IColleen M.D. personally performed the services described in this documentation, as scribed by Oliver Armstrong in my presence, and it is both accurate and complete.    The note accurately reflects work and decisions made by me.  Colleen Cheung  2/20/2018  6:19 AM    This dictation has been created using voice recognition software and/or scribes. The accuracy of the dictation is limited by the abilities of the software and the expertise of the scribes. I expect there may be some errors of grammar and possibly content. I made every attempt to manually correct the errors within my dictation. However, errors related to voice recognition software and/or scribes may still exist and should be interpreted within the appropriate context.  "

## 2018-02-20 NOTE — ED NOTES
Gisela from Lab called with critical result of troponin at 2.35. Critical lab result read back to Gisela.   Patient in process of transfer, admitting RN verbalized will update admitting MD on critical lab.    Donatonelson Yancey Patrica transported to [facility] via [employer] with RNx2 on monitor. All personal belongings in possession.  NAD noted.

## 2018-02-20 NOTE — ED TRIAGE NOTES
Pt bib ambulance, wife called EMS after pt had near syncopal episode. Pt was watching tv, stood up, felt dizzy, wife assisted him back to the chair. EMS report hx of AAA, on on scene BP 84/67, . 300 mL NS administered.

## 2018-02-20 NOTE — ASSESSMENT & PLAN NOTE
Patient has a known history of gallbladder cancer status post resection and chronically elevated LFTs. His AST and AST are not significantly changed from his last available labs however he has developed hyperbilirubinemia. He had a stent placed in his liver one and a half years ago. He has not yet followed up with GI since. He denies any abdominal pain. I will repeat an abdominal ultrasound to see if there is any new underlying disease.  hepatitis panel negative. He will receive gentle fluids and will repeat chemistries in the morning.

## 2018-02-20 NOTE — PROGRESS NOTES
MD Deutsch updated on newest trop of 4.98. Patient does not complain of chest or shoulder pain. No complaints. No signs of distress.

## 2018-02-20 NOTE — PROGRESS NOTES
Two RN skin assessment completed with STEFFANY [place]. Bilateral ears red and blanching from chronic use of glasses. Sacrum pink and blanching. Bilateral heels pink, blanching, boggy, and dry. Skin otherwise intact.

## 2018-02-20 NOTE — PROGRESS NOTES
Dr. Deutsch consulted about patient's follow up EKG post elevated troponin. No new orders received.

## 2018-02-21 PROBLEM — I21.4 NSTEMI (NON-ST ELEVATED MYOCARDIAL INFARCTION) (HCC): Status: ACTIVE | Noted: 2018-02-21

## 2018-02-21 LAB
ANION GAP SERPL CALC-SCNC: 7 MMOL/L (ref 0–11.9)
APTT PPP: 69.8 SEC (ref 24.7–36)
APTT PPP: 91.6 SEC (ref 24.7–36)
BUN SERPL-MCNC: 25 MG/DL (ref 8–22)
CALCIUM SERPL-MCNC: 7.9 MG/DL (ref 8.5–10.5)
CHLORIDE SERPL-SCNC: 115 MMOL/L (ref 96–112)
CO2 SERPL-SCNC: 18 MMOL/L (ref 20–33)
CREAT SERPL-MCNC: 0.66 MG/DL (ref 0.5–1.4)
ERYTHROCYTE [DISTWIDTH] IN BLOOD BY AUTOMATED COUNT: 47.9 FL (ref 35.9–50)
GLUCOSE SERPL-MCNC: 77 MG/DL (ref 65–99)
HCT VFR BLD AUTO: 30.7 % (ref 42–52)
HGB BLD-MCNC: 10.5 G/DL (ref 14–18)
MCH RBC QN AUTO: 31.3 PG (ref 27–33)
MCHC RBC AUTO-ENTMCNC: 34.2 G/DL (ref 33.7–35.3)
MCV RBC AUTO: 91.4 FL (ref 81.4–97.8)
PLATELET # BLD AUTO: 116 K/UL (ref 164–446)
PMV BLD AUTO: 10.1 FL (ref 9–12.9)
POTASSIUM SERPL-SCNC: 3.5 MMOL/L (ref 3.6–5.5)
RBC # BLD AUTO: 3.36 M/UL (ref 4.7–6.1)
SODIUM SERPL-SCNC: 140 MMOL/L (ref 135–145)
TROPONIN I SERPL-MCNC: 3.28 NG/ML (ref 0–0.04)
TROPONIN I SERPL-MCNC: 4.37 NG/ML (ref 0–0.04)
WBC # BLD AUTO: 9.9 K/UL (ref 4.8–10.8)

## 2018-02-21 PROCEDURE — 307093 HCHG TR BAND RADIAL

## 2018-02-21 PROCEDURE — C1769 GUIDE WIRE: HCPCS

## 2018-02-21 PROCEDURE — C1887 CATHETER, GUIDING: HCPCS

## 2018-02-21 PROCEDURE — 770020 HCHG ROOM/CARE - TELE (206)

## 2018-02-21 PROCEDURE — B2111ZZ FLUOROSCOPY OF MULTIPLE CORONARY ARTERIES USING LOW OSMOLAR CONTRAST: ICD-10-PCS | Performed by: INTERNAL MEDICINE

## 2018-02-21 PROCEDURE — C1725 CATH, TRANSLUMIN NON-LASER: HCPCS

## 2018-02-21 PROCEDURE — 700105 HCHG RX REV CODE 258: Performed by: INTERNAL MEDICINE

## 2018-02-21 PROCEDURE — 99153 MOD SED SAME PHYS/QHP EA: CPT

## 2018-02-21 PROCEDURE — 85027 COMPLETE CBC AUTOMATED: CPT

## 2018-02-21 PROCEDURE — 84484 ASSAY OF TROPONIN QUANT: CPT

## 2018-02-21 PROCEDURE — C1894 INTRO/SHEATH, NON-LASER: HCPCS

## 2018-02-21 PROCEDURE — 80048 BASIC METABOLIC PNL TOTAL CA: CPT

## 2018-02-21 PROCEDURE — 85730 THROMBOPLASTIN TIME PARTIAL: CPT

## 2018-02-21 PROCEDURE — 93458 L HRT ARTERY/VENTRICLE ANGIO: CPT

## 2018-02-21 PROCEDURE — 700102 HCHG RX REV CODE 250 W/ 637 OVERRIDE(OP)

## 2018-02-21 PROCEDURE — A9270 NON-COVERED ITEM OR SERVICE: HCPCS

## 2018-02-21 PROCEDURE — 02703ZZ DILATION OF CORONARY ARTERY, ONE ARTERY, PERCUTANEOUS APPROACH: ICD-10-PCS | Performed by: INTERNAL MEDICINE

## 2018-02-21 PROCEDURE — 700102 HCHG RX REV CODE 250 W/ 637 OVERRIDE(OP): Performed by: HOSPITALIST

## 2018-02-21 PROCEDURE — 700111 HCHG RX REV CODE 636 W/ 250 OVERRIDE (IP)

## 2018-02-21 PROCEDURE — 99152 MOD SED SAME PHYS/QHP 5/>YRS: CPT

## 2018-02-21 PROCEDURE — 4A023N7 MEASUREMENT OF CARDIAC SAMPLING AND PRESSURE, LEFT HEART, PERCUTANEOUS APPROACH: ICD-10-PCS | Performed by: INTERNAL MEDICINE

## 2018-02-21 PROCEDURE — 304952 HCHG R 2 PADS

## 2018-02-21 PROCEDURE — 700111 HCHG RX REV CODE 636 W/ 250 OVERRIDE (IP): Performed by: HOSPITALIST

## 2018-02-21 PROCEDURE — A9270 NON-COVERED ITEM OR SERVICE: HCPCS | Performed by: HOSPITALIST

## 2018-02-21 PROCEDURE — 93005 ELECTROCARDIOGRAM TRACING: CPT | Performed by: INTERNAL MEDICINE

## 2018-02-21 PROCEDURE — 93010 ELECTROCARDIOGRAM REPORT: CPT | Performed by: INTERNAL MEDICINE

## 2018-02-21 PROCEDURE — 700117 HCHG RX CONTRAST REV CODE 255: Performed by: INTERNAL MEDICINE

## 2018-02-21 PROCEDURE — 700101 HCHG RX REV CODE 250

## 2018-02-21 PROCEDURE — 700111 HCHG RX REV CODE 636 W/ 250 OVERRIDE (IP): Mod: JG | Performed by: INTERNAL MEDICINE

## 2018-02-21 PROCEDURE — 36415 COLL VENOUS BLD VENIPUNCTURE: CPT

## 2018-02-21 PROCEDURE — C9600 PERC DRUG-EL COR STENT SING: HCPCS | Mod: 53

## 2018-02-21 PROCEDURE — 99233 SBSQ HOSP IP/OBS HIGH 50: CPT | Performed by: HOSPITALIST

## 2018-02-21 RX ORDER — VERAPAMIL HYDROCHLORIDE 2.5 MG/ML
INJECTION, SOLUTION INTRAVENOUS
Status: COMPLETED
Start: 2018-02-21 | End: 2018-02-21

## 2018-02-21 RX ORDER — CLOPIDOGREL BISULFATE 75 MG/1
600 TABLET ORAL ONCE
Status: ACTIVE | OUTPATIENT
Start: 2018-02-21 | End: 2018-02-22

## 2018-02-21 RX ORDER — BIVALIRUDIN 250 MG/5ML
INJECTION, POWDER, LYOPHILIZED, FOR SOLUTION INTRAVENOUS
Status: COMPLETED
Start: 2018-02-21 | End: 2018-02-21

## 2018-02-21 RX ORDER — HEPARIN SODIUM,PORCINE 1000/ML
VIAL (ML) INJECTION
Status: DISPENSED
Start: 2018-02-21 | End: 2018-02-22

## 2018-02-21 RX ORDER — LOSARTAN POTASSIUM 25 MG/1
25 TABLET ORAL DAILY
Status: DISCONTINUED | OUTPATIENT
Start: 2018-02-22 | End: 2018-02-21

## 2018-02-21 RX ORDER — SODIUM CHLORIDE 9 MG/ML
INJECTION, SOLUTION INTRAVENOUS CONTINUOUS
Status: DISCONTINUED | OUTPATIENT
Start: 2018-02-21 | End: 2018-02-21

## 2018-02-21 RX ORDER — LIDOCAINE HYDROCHLORIDE 20 MG/ML
INJECTION, SOLUTION INFILTRATION; PERINEURAL
Status: COMPLETED
Start: 2018-02-21 | End: 2018-02-21

## 2018-02-21 RX ORDER — METOPROLOL SUCCINATE 50 MG/1
50 TABLET, EXTENDED RELEASE ORAL
Status: DISCONTINUED | OUTPATIENT
Start: 2018-02-21 | End: 2018-02-22

## 2018-02-21 RX ORDER — LOSARTAN POTASSIUM 25 MG/1
25 TABLET ORAL DAILY
Status: DISCONTINUED | OUTPATIENT
Start: 2018-02-21 | End: 2018-02-22

## 2018-02-21 RX ORDER — CLOPIDOGREL 300 MG/1
TABLET, FILM COATED ORAL
Status: COMPLETED
Start: 2018-02-21 | End: 2018-02-21

## 2018-02-21 RX ORDER — CLOPIDOGREL BISULFATE 75 MG/1
75 TABLET ORAL DAILY
Status: DISCONTINUED | OUTPATIENT
Start: 2018-02-22 | End: 2018-02-22 | Stop reason: HOSPADM

## 2018-02-21 RX ORDER — CHLORAL HYDRATE 500 MG
2000 CAPSULE ORAL DAILY
Status: DISCONTINUED | OUTPATIENT
Start: 2018-02-21 | End: 2018-02-22 | Stop reason: HOSPADM

## 2018-02-21 RX ORDER — MIDAZOLAM HYDROCHLORIDE 1 MG/ML
INJECTION INTRAMUSCULAR; INTRAVENOUS
Status: COMPLETED
Start: 2018-02-21 | End: 2018-02-21

## 2018-02-21 RX ADMIN — SODIUM CHLORIDE: 9 INJECTION, SOLUTION INTRAVENOUS at 14:45

## 2018-02-21 RX ADMIN — VERAPAMIL HYDROCHLORIDE 2.5 MG: 2.5 INJECTION, SOLUTION INTRAVENOUS at 13:41

## 2018-02-21 RX ADMIN — LOSARTAN POTASSIUM 25 MG: 25 TABLET, FILM COATED ORAL at 22:49

## 2018-02-21 RX ADMIN — POTASSIUM CHLORIDE 20 MEQ: 1500 TABLET, EXTENDED RELEASE ORAL at 09:00

## 2018-02-21 RX ADMIN — CEFTRIAXONE SODIUM 1 G: 10 INJECTION, POWDER, FOR SOLUTION INTRAVENOUS at 09:13

## 2018-02-21 RX ADMIN — LIDOCAINE HYDROCHLORIDE: 20 INJECTION, SOLUTION INFILTRATION; PERINEURAL at 13:41

## 2018-02-21 RX ADMIN — BIVALIRUDIN 250 MG: 250 INJECTION, POWDER, LYOPHILIZED, FOR SOLUTION INTRAVENOUS at 14:00

## 2018-02-21 RX ADMIN — HEPARIN SODIUM 1050 UNITS: 5000 INJECTION, SOLUTION INTRAVENOUS at 10:54

## 2018-02-21 RX ADMIN — IOHEXOL 110 ML: 350 INJECTION, SOLUTION INTRAVENOUS at 14:15

## 2018-02-21 RX ADMIN — HEPARIN SODIUM 2000 UNITS: 200 INJECTION, SOLUTION INTRAVENOUS at 14:00

## 2018-02-21 RX ADMIN — METOPROLOL SUCCINATE 50 MG: 50 TABLET, EXTENDED RELEASE ORAL at 22:49

## 2018-02-21 RX ADMIN — FENTANYL CITRATE 50 MCG: 50 INJECTION, SOLUTION INTRAMUSCULAR; INTRAVENOUS at 14:15

## 2018-02-21 RX ADMIN — SODIUM CHLORIDE: 9 INJECTION, SOLUTION INTRAVENOUS at 07:43

## 2018-02-21 RX ADMIN — BIVALIRUDIN 0.2 MG/KG/HR: 250 INJECTION, POWDER, LYOPHILIZED, FOR SOLUTION INTRAVENOUS at 14:00

## 2018-02-21 RX ADMIN — NITROGLYCERIN 10 ML: 20 INJECTION INTRAVENOUS at 13:41

## 2018-02-21 RX ADMIN — ASPIRIN 325 MG: 325 TABLET, COATED ORAL at 07:43

## 2018-02-21 RX ADMIN — MIDAZOLAM 1 MG: 1 INJECTION INTRAMUSCULAR; INTRAVENOUS at 14:15

## 2018-02-21 RX ADMIN — TERAZOSIN HYDROCHLORIDE ANHYDROUS 5 MG: 5 CAPSULE ORAL at 07:43

## 2018-02-21 RX ADMIN — CLOPIDOGREL BISULFATE 600 MG: 300 TABLET, FILM COATED ORAL at 14:21

## 2018-02-21 RX ADMIN — TERAZOSIN HYDROCHLORIDE ANHYDROUS 5 MG: 5 CAPSULE ORAL at 19:53

## 2018-02-21 ASSESSMENT — PAIN SCALES - GENERAL
PAINLEVEL_OUTOF10: 0

## 2018-02-21 ASSESSMENT — ENCOUNTER SYMPTOMS
DIAPHORESIS: 0
CHILLS: 0
DIARRHEA: 0
COUGH: 0
SENSORY CHANGE: 0
BACK PAIN: 0
SPEECH CHANGE: 0
SHORTNESS OF BREATH: 0
EYE DISCHARGE: 0
HEADACHES: 0
DEPRESSION: 0
CONSTIPATION: 0
HEMOPTYSIS: 0
WHEEZING: 0
CLAUDICATION: 0
PALPITATIONS: 0
SORE THROAT: 0
NECK PAIN: 0
ABDOMINAL PAIN: 0
SPUTUM PRODUCTION: 0
MYALGIAS: 0
EYE PAIN: 0
VOMITING: 0
WEAKNESS: 0
DIZZINESS: 1
LOSS OF CONSCIOUSNESS: 0
FOCAL WEAKNESS: 0
FEVER: 0
NAUSEA: 0
BRUISES/BLEEDS EASILY: 0

## 2018-02-21 ASSESSMENT — LIFESTYLE VARIABLES: SUBSTANCE_ABUSE: 0

## 2018-02-21 NOTE — PROGRESS NOTES
[facility] Hospitalist Progress Note    Date of Service: 2018    Chief Complaint  88 y.o. male admitted 2018 with no prior cardiac history presents with dizziness when stood up from sitting last night while at rest.  The wife stated it lasted 20 minutes and called EMS.      Interval Problem Update  :  Continued increase in troponin with change in EKG with ST depression anterior leads and t wave inversions inferior leads compared to prior EKG.  Denies CP, but wife states he is stoic and will not state when something hurts.  Remains dizzy with sitting up.      Consultants/Specialty  :  Dr. Rodas consult appreciated.    Disposition  Home with wife.  PT/OT p.        Review of Systems   Constitutional: Negative for chills, diaphoresis, fever and malaise/fatigue.   HENT: Negative for congestion and sore throat.    Eyes: Negative for pain and discharge.   Respiratory: Negative for cough, hemoptysis, sputum production, shortness of breath and wheezing.    Cardiovascular: Negative for chest pain, palpitations, claudication and leg swelling.   Gastrointestinal: Negative for abdominal pain, constipation, diarrhea, melena, nausea and vomiting.   Genitourinary: Negative for dysuria, frequency and urgency.   Musculoskeletal: Negative for back pain, joint pain, myalgias and neck pain.   Skin: Negative for itching and rash.   Neurological: Positive for dizziness. Negative for sensory change, speech change, focal weakness, loss of consciousness, weakness and headaches.   Endo/Heme/Allergies: Does not bruise/bleed easily.   Psychiatric/Behavioral: Negative for depression, substance abuse and suicidal ideas.      Physical Exam  Laboratory/Imaging   Hemodynamics  Temp (24hrs), Av.1 °C (98.7 °F), Min:36.8 °C (98.2 °F), Max:37.4 °C (99.4 °F)   Temperature: 36.9 °C (98.5 °F)  Pulse  Av.2  Min: 66  Max: 121 Heart Rate (Monitored): 95  Blood Pressure : 103/58, NIBP: 108/54      Respiratory      Respiration: 17,  Pulse Oximetry: 93 %        RUL Breath Sounds: Clear, RML Breath Sounds: Clear, RLL Breath Sounds: Diminished, REBECCA Breath Sounds: Clear, LLL Breath Sounds: Diminished    Fluids    Intake/Output Summary (Last 24 hours) at 02/20/18 1929  Last data filed at 02/20/18 1600   Gross per 24 hour   Intake              240 ml   Output              230 ml   Net               10 ml       Nutrition  Orders Placed This Encounter   Procedures   • DIET NPO     Standing Status:   Standing     Number of Occurrences:   1     Order Specific Question:   Restrict to:     Answer:   Sips with Medications [3]     Physical Exam   Constitutional: He is oriented to person, place, and time. He appears well-developed. No distress.   Elderly, frail   HENT:   Head: Normocephalic and atraumatic.   Mouth/Throat: Oropharynx is clear and moist. No oropharyngeal exudate.   Eyes: Conjunctivae and EOM are normal. Pupils are equal, round, and reactive to light. Right eye exhibits no discharge. Left eye exhibits no discharge. No scleral icterus.   Neck: Normal range of motion. Neck supple. No JVD present. No tracheal deviation present. No thyromegaly present.   Cardiovascular: Normal rate and regular rhythm.  Exam reveals no gallop and no friction rub.    Murmur (2/6 holosystolic) heard.  Pulmonary/Chest: Effort normal and breath sounds normal. No respiratory distress. He has no wheezes. He has no rales. He exhibits no tenderness.   Abdominal: Soft. Bowel sounds are normal. He exhibits no distension and no mass. There is no tenderness. There is no rebound and no guarding.   Musculoskeletal: Normal range of motion. He exhibits no edema or tenderness.   Lymphadenopathy:     He has no cervical adenopathy.   Neurological: He is alert and oriented to person, place, and time. No cranial nerve deficit. He exhibits normal muscle tone.   Skin: Skin is warm and dry. No rash noted. He is not diaphoretic. No erythema.   Psychiatric: He has a normal mood and affect.  His behavior is normal. Judgment and thought content normal.   Nursing note and vitals reviewed.      Recent Labs      02/20/18   0007  02/20/18   1206   WBC  11.3*  19.2*   RBC  3.65*  3.54*   HEMOGLOBIN  11.1*  10.7*   HEMATOCRIT  32.8*  32.5*   MCV  89.9  91.8   MCH  30.4  30.2   MCHC  33.8  32.9*   RDW  45.6  48.6   PLATELETCT  152*  143*   MPV  9.6  9.5     Recent Labs      02/20/18   0007  02/20/18   1206   SODIUM  139  138   POTASSIUM  3.5*  3.7   CHLORIDE  109  110   CO2  19*  20   GLUCOSE  133*  94   BUN  30*  26*   CREATININE  0.94  0.76   CALCIUM  8.5  8.4*     Recent Labs      02/20/18   1206  02/20/18   1845   APTT   --   123.4*   INR  1.24*   --                   Assessment/Plan     * Near syncope- (present on admission)   Assessment & Plan    From the patient's history, it does appear to be orthostatic in nature likely worsened by dehydration. He'll be admitted to the telemetry floor for monitoring of evidence of cardiac dysrhythmias. I will trend troponin levels. I will check orthostatic blood pressures and give him gentle IV fluids. He has no focal deficits.        Dehydration   Assessment & Plan    No obvious reason for dehydration          Elevated troponin   Assessment & Plan    Patient has no chest pains, + ST changes on his EKG by my read. I will continue to trend his troponin levels and monitor him on telemetry.  2/20 Dr. Rodas consulted.  No other reason for elevated troponin other than cardiac.  Patient and wife state will undergo cardiac cath.  Started heparin drip in addition to asa 325 daily since BP too low for nitro or BB.  Remains with near syncope and hypotension only.  H/o AAA.  No obvious pain to suggest dissection.        LFTs abnormal- (present on admission)   Assessment & Plan    Patient has a known history of gallbladder cancer status post resection and chronically elevated LFTs. His AST and AST are not significantly changed from his last available labs however he has  developed hyperbilirubinemia. He had a stent placed in his liver one and a half years ago. He has not yet followed up with GI since. He denies any abdominal pain. I will repeat an abdominal ultrasound to see if there is any new underlying disease.  hepatitis panel negative. He will receive gentle fluids and will repeat chemistries in the morning.        Essential hypertension- (present on admission)   Assessment & Plan    Now with hypotension this a.m., jessie'd Cozaar and Toprol.        Dyslipidemia- (present on admission)   Assessment & Plan    I'm holding the patient's home statin for now in light of his elevated LFTs, if they begin to trend down and we will consider repeat starting his medications.          Quality-Core Measures

## 2018-02-21 NOTE — PROGRESS NOTES
Received report from day shift RN Deja. Assumed care of patient. Patient is SR on the monitor at this time. Patient is laying in bed with spouse present at bedside. Patient declines any pain or needs at this time. Plan of care discussed with patient and spouse. Bed alarm on. Bed locked and in the lowest position with call light within reach.

## 2018-02-21 NOTE — PROCEDURES
PREOPERATIVE DIAGNOSIS:  1. NSTEMI  2. Weakness    POSTOPERATIVE DIAGNOSIS:  1. 98% mid LAD heavily calcified stenosis, culprit  2. Moderate nonobstructive CAD otherwise.  3. Successful PTCA mid LAD with 40% residual stenosis, unable to cross with any stents.      PROCEDURE PERFORMED:  Selective coronary angiography of the native vessels  Left heart catheterization  PCI of mid LAD PTCA    DESCRIPTION OF PROCEDURE:  The risks and benefits of cardiac catheterization as well as the procedure itself, rationale and appropriateness were discussed with the patient today. Complications including but not limited to death, stroke, MI, urgent bypass surgery, contrast nephropathy, vascular complications, bleeding and infection were explained to the patient. The potential outcomes associated with the procedure (possible PCI, possible CABG, possible medical Rx only) were also discussed at length. The patient agreed to proceed.    The patient was transported to the catheterization laboratory in the fasting state. The right radial area and right groin were prepped and draped in the usual fashion. Right radial area was entered with a single through and through puncture and a 6F glide sheath was placed. An intra-arterial cocktail of heparin, verapamil and nitroglycerine was administered. Over a wire, a 5F Daria catheter was passed to the aortic root and used to engage the right and left coronaries without difficulty. Contrast was administered and multiple images obtained. This catheter was then used to cross the aortic valve for LHC. The decision was made to intervene on culprit coronary artery.    DESCRIPTION OF PCI:  The decision was made to intervene on the culprit coronary artery. Bivalirudin bolus and infusion was initiated. The diagnostic catheter was exchanged over a wire for an 6 Kazakh EBU 3.5 guide seated appropriately. A BMW 0.014 floppy tip wire was navigated across the culprit stenosis. A 2.5 x 15 mm compliant balloon  was used to predilate the lesion. A 3.0 x 24 mm Synergy ANUJA was then unable to cross the lesion. Upon withdrawal the proximal portion of the stent was disrupted although the entire stent was retrieved. Following this a 2.5 x 15 mm noncompliant balloon was used to predilate the length of the heavily calcified area of hang-up. Despite this a 2nd Synergy 3.0 x 18 mm ANUJA was unable to cross and withdrawal again resulted in disruption of the proximal stent struts. Final angiographic films were obtained showing significant improvement in culprit stenosis with residual stenosis of 40% and GERARD-3 flow. No proximal disruption or flow injury was noted. All catheters and guidewires were removed. A TR band was applied to the right wrist are cheap in hemostasis. Patient tolerated the procedure well left the Cath Lab in stable condition.    Moderate sedation directly monitored by me during the case while supervising the administration of the sedation medication by an independent trained RN to assist in the monitoring of the patient's level of consciousness and physiological status. I, the supervising physician was present the entire time from beginning of medication administration until the end of the procedure. For detailed administration records please see the moderate sedation documentation in the median tab.      FINDINGS:  I. HEMODYNAMICS:    Ao: 165/98 mmHg   LEDP: 16 mmHg   Gradient on LV pullback: No    II. CORONARY ANGIOGRAPHY:  Left Main: Moderate-sized bifurcating no CAD.  Left Anterior Descending: Large wraps around the apex gives rise to a large 1st diagonal. There is a 40% eccentric proximal plaque. There is a 98% thrombotic appearing midportion stenosis which is heavily calcified. There is diffuse apical disease. Post intervention with PTCA there is a 40% residual stenosis at the culprit site.  Left Circumflex: Large and dominant with a 30-40% proximal stenosis and a 60% ostial stenosis.  Right Coronary: Small to  moderate sized to 50% midportion stenosis.    COMPLICATIONS: none apparent    CONCLUSIONS:  1. 98% mid LAD heavily calcified stenosis, culprit  2. Moderate nonobstructive CAD otherwise.  3. Successful PTCA mid LAD with 40% residual stenosis, unable to cross with any stents.    RECOMMENDATIONS:  Medical therapy with the addition of Plavix  Should he have recurrent angina then a repeat procedure utilizing rotational atherectomy could be considered. However given his advanced age further discussions are warranted prior to this.

## 2018-02-21 NOTE — CARE PLAN
Problem: Communication  Goal: The ability to communicate needs accurately and effectively will improve  Outcome: PROGRESSING AS EXPECTED  POC discussed at bedside - patient verbalizes understanding.  Education on medications and procedures provided.   White board updated, patient encouraged to call for all needs. Calls appropriately. No immediate concerns at this time.       Problem: Safety  Goal: Will remain free from falls  Outcome: PROGRESSING AS EXPECTED  tredded socks on patient, call light in reach, personal possessions in available/in reach, hourly rounding in place. Education regarding bed alarm provided. Patient verbalizes understanding.       Problem: Venous Thromboembolism (VTW)/Deep Vein Thrombosis (DVT) Prevention:  Goal: Patient will participate in Venous Thrombosis (VTE)/Deep Vein Thrombosis (DVT)Prevention Measures  Outcome: PROGRESSING AS EXPECTED  VTE prophylaxis addressed with Dr. Deutsch at rounds.   Heparin gtt initated for current dx. By [facility] cardiology.       Problem: Knowledge Deficit  Goal: Knowledge of disease process/condition, treatment plan, diagnostic tests, and medications will improve  Outcome: PROGRESSING AS EXPECTED    Intervention: Explain information regarding disease process/condition, treatment plan, diagnostic tests, and medications and document in education  Patient and wife, June, updated on current POC. Education on Troponins, NSTEMI, and medication provided.

## 2018-02-21 NOTE — CARE PLAN
Problem: Safety  Goal: Will remain free from injury  Outcome: PROGRESSING AS EXPECTED  Discussed with the patient the importance of calling for assistance prior to getting out of bed in order to help prevent falls since he had the previous near syncope episode at home. Patient accepting of the information. All questions answered at this time.     Problem: Infection  Goal: Will remain free from infection  Outcome: PROGRESSING AS EXPECTED  Discussed with the patient the importance of washing hands before and after eating or using the restroom in order to help prevent infections while in the hospital. Patient accepting of the information. All questions answered at this time.

## 2018-02-22 ENCOUNTER — HOME HEALTH ADMISSION (OUTPATIENT)
Dept: HOME HEALTH SERVICES | Facility: HOME HEALTHCARE | Age: 83
End: 2018-02-22
Payer: MEDICARE

## 2018-02-22 VITALS
OXYGEN SATURATION: 92 % | TEMPERATURE: 98.5 F | HEART RATE: 70 BPM | RESPIRATION RATE: 16 BRPM | HEIGHT: 69 IN | BODY MASS INDEX: 21.39 KG/M2 | WEIGHT: 144.4 LBS | DIASTOLIC BLOOD PRESSURE: 70 MMHG | SYSTOLIC BLOOD PRESSURE: 149 MMHG

## 2018-02-22 PROBLEM — E86.0 DEHYDRATION: Status: RESOLVED | Noted: 2018-02-20 | Resolved: 2018-02-22

## 2018-02-22 LAB
ALBUMIN SERPL BCP-MCNC: 2.8 G/DL (ref 3.2–4.9)
ALBUMIN/GLOB SERPL: 1.3 G/DL
ALP SERPL-CCNC: 145 U/L (ref 30–99)
ALT SERPL-CCNC: 52 U/L (ref 2–50)
ANION GAP SERPL CALC-SCNC: 6 MMOL/L (ref 0–11.9)
AST SERPL-CCNC: 34 U/L (ref 12–45)
BACTERIA UR CULT: NORMAL
BASOPHILS # BLD AUTO: 0 % (ref 0–1.8)
BASOPHILS # BLD: 0 K/UL (ref 0–0.12)
BILIRUB SERPL-MCNC: 0.9 MG/DL (ref 0.1–1.5)
BUN SERPL-MCNC: 16 MG/DL (ref 8–22)
CALCIUM SERPL-MCNC: 7.9 MG/DL (ref 8.5–10.5)
CHLORIDE SERPL-SCNC: 112 MMOL/L (ref 96–112)
CO2 SERPL-SCNC: 22 MMOL/L (ref 20–33)
CREAT SERPL-MCNC: 0.64 MG/DL (ref 0.5–1.4)
EKG IMPRESSION: NORMAL
EOSINOPHIL # BLD AUTO: 0.05 K/UL (ref 0–0.51)
EOSINOPHIL NFR BLD: 0.9 % (ref 0–6.9)
ERYTHROCYTE [DISTWIDTH] IN BLOOD BY AUTOMATED COUNT: 46.7 FL (ref 35.9–50)
GLOBULIN SER CALC-MCNC: 2.2 G/DL (ref 1.9–3.5)
GLUCOSE SERPL-MCNC: 87 MG/DL (ref 65–99)
HCT VFR BLD AUTO: 32.4 % (ref 42–52)
HGB BLD-MCNC: 10.6 G/DL (ref 14–18)
LYMPHOCYTES # BLD AUTO: 0.27 K/UL (ref 1–4.8)
LYMPHOCYTES NFR BLD: 4.4 % (ref 22–41)
MANUAL DIFF BLD: NORMAL
MCH RBC QN AUTO: 29.7 PG (ref 27–33)
MCHC RBC AUTO-ENTMCNC: 32.7 G/DL (ref 33.7–35.3)
MCV RBC AUTO: 90.8 FL (ref 81.4–97.8)
MONOCYTES # BLD AUTO: 0.16 K/UL (ref 0–0.85)
MONOCYTES NFR BLD AUTO: 2.6 % (ref 0–13.4)
MORPHOLOGY BLD-IMP: NORMAL
NEUTROPHILS # BLD AUTO: 5.62 K/UL (ref 1.82–7.42)
NEUTROPHILS NFR BLD: 92.1 % (ref 44–72)
NRBC # BLD AUTO: 0 K/UL
NRBC BLD-RTO: 0 /100 WBC
PLATELET # BLD AUTO: 108 K/UL (ref 164–446)
PLATELET BLD QL SMEAR: NORMAL
PMV BLD AUTO: 10 FL (ref 9–12.9)
POTASSIUM SERPL-SCNC: 3.5 MMOL/L (ref 3.6–5.5)
PROT SERPL-MCNC: 5 G/DL (ref 6–8.2)
RBC # BLD AUTO: 3.57 M/UL (ref 4.7–6.1)
RBC BLD AUTO: NORMAL
SIGNIFICANT IND 70042: NORMAL
SITE SITE: NORMAL
SODIUM SERPL-SCNC: 140 MMOL/L (ref 135–145)
SOURCE SOURCE: NORMAL
WBC # BLD AUTO: 6.1 K/UL (ref 4.8–10.8)

## 2018-02-22 PROCEDURE — A9270 NON-COVERED ITEM OR SERVICE: HCPCS | Performed by: HOSPITALIST

## 2018-02-22 PROCEDURE — 700102 HCHG RX REV CODE 250 W/ 637 OVERRIDE(OP): Performed by: HOSPITALIST

## 2018-02-22 PROCEDURE — A9270 NON-COVERED ITEM OR SERVICE: HCPCS | Performed by: INTERNAL MEDICINE

## 2018-02-22 PROCEDURE — 85027 COMPLETE CBC AUTOMATED: CPT

## 2018-02-22 PROCEDURE — G8987 SELF CARE CURRENT STATUS: HCPCS | Mod: CI

## 2018-02-22 PROCEDURE — 700102 HCHG RX REV CODE 250 W/ 637 OVERRIDE(OP): Performed by: INTERNAL MEDICINE

## 2018-02-22 PROCEDURE — 97162 PT EVAL MOD COMPLEX 30 MIN: CPT

## 2018-02-22 PROCEDURE — G8989 SELF CARE D/C STATUS: HCPCS | Mod: CI

## 2018-02-22 PROCEDURE — G8988 SELF CARE GOAL STATUS: HCPCS | Mod: CI

## 2018-02-22 PROCEDURE — 97166 OT EVAL MOD COMPLEX 45 MIN: CPT

## 2018-02-22 PROCEDURE — G8978 MOBILITY CURRENT STATUS: HCPCS | Mod: CI

## 2018-02-22 PROCEDURE — G8979 MOBILITY GOAL STATUS: HCPCS | Mod: CI

## 2018-02-22 PROCEDURE — 97535 SELF CARE MNGMENT TRAINING: CPT

## 2018-02-22 PROCEDURE — 700111 HCHG RX REV CODE 636 W/ 250 OVERRIDE (IP): Performed by: HOSPITALIST

## 2018-02-22 PROCEDURE — 36415 COLL VENOUS BLD VENIPUNCTURE: CPT

## 2018-02-22 PROCEDURE — 80053 COMPREHEN METABOLIC PANEL: CPT

## 2018-02-22 PROCEDURE — 99239 HOSP IP/OBS DSCHRG MGMT >30: CPT | Performed by: HOSPITALIST

## 2018-02-22 PROCEDURE — 85007 BL SMEAR W/DIFF WBC COUNT: CPT

## 2018-02-22 RX ORDER — POTASSIUM CHLORIDE 20 MEQ/1
40 TABLET, EXTENDED RELEASE ORAL DAILY
Status: DISCONTINUED | OUTPATIENT
Start: 2018-02-22 | End: 2018-02-22 | Stop reason: HOSPADM

## 2018-02-22 RX ORDER — CHLORAL HYDRATE 500 MG
2000 CAPSULE ORAL DAILY
Qty: 60 CAP | Refills: 3 | Status: ON HOLD | OUTPATIENT
Start: 2018-02-23 | End: 2018-04-21

## 2018-02-22 RX ORDER — ASPIRIN 81 MG/1
81 TABLET ORAL DAILY
Qty: 30 TAB | Refills: 3 | Status: SHIPPED | OUTPATIENT
Start: 2018-02-23 | End: 2019-09-12

## 2018-02-22 RX ORDER — POTASSIUM CHLORIDE 20 MEQ/1
20 TABLET, EXTENDED RELEASE ORAL DAILY
Qty: 30 TAB | Refills: 0 | Status: SHIPPED | OUTPATIENT
Start: 2018-02-23 | End: 2018-03-08 | Stop reason: SDUPTHER

## 2018-02-22 RX ORDER — ATORVASTATIN CALCIUM 10 MG/1
10 TABLET, FILM COATED ORAL
Status: DISCONTINUED | OUTPATIENT
Start: 2018-02-22 | End: 2018-02-22 | Stop reason: HOSPADM

## 2018-02-22 RX ORDER — LOSARTAN POTASSIUM 50 MG/1
50 TABLET ORAL DAILY
Qty: 30 TAB | Refills: 3 | Status: SHIPPED | OUTPATIENT
Start: 2018-02-23 | End: 2018-03-08

## 2018-02-22 RX ORDER — CLOPIDOGREL BISULFATE 75 MG/1
75 TABLET ORAL DAILY
Qty: 30 TAB | Refills: 3 | Status: SHIPPED | OUTPATIENT
Start: 2018-02-23 | End: 2018-03-07 | Stop reason: SDUPTHER

## 2018-02-22 RX ORDER — LOSARTAN POTASSIUM 50 MG/1
50 TABLET ORAL DAILY
Status: DISCONTINUED | OUTPATIENT
Start: 2018-02-23 | End: 2018-02-22 | Stop reason: HOSPADM

## 2018-02-22 RX ORDER — METOPROLOL SUCCINATE 25 MG/1
25 TABLET, EXTENDED RELEASE ORAL
Status: DISCONTINUED | OUTPATIENT
Start: 2018-02-23 | End: 2018-02-22

## 2018-02-22 RX ORDER — ATORVASTATIN CALCIUM 40 MG/1
40 TABLET, FILM COATED ORAL
Status: DISCONTINUED | OUTPATIENT
Start: 2018-02-22 | End: 2018-02-22

## 2018-02-22 RX ORDER — METOPROLOL SUCCINATE 25 MG/1
12.5 TABLET, EXTENDED RELEASE ORAL DAILY
Qty: 30 TAB | Refills: 3 | Status: SHIPPED | OUTPATIENT
Start: 2018-02-23 | End: 2018-03-08 | Stop reason: SDUPTHER

## 2018-02-22 RX ORDER — ATORVASTATIN CALCIUM 10 MG/1
10 TABLET, FILM COATED ORAL
Qty: 30 TAB | Refills: 3 | Status: SHIPPED | OUTPATIENT
Start: 2018-02-22 | End: 2018-02-26 | Stop reason: SDUPTHER

## 2018-02-22 RX ORDER — METOPROLOL SUCCINATE 25 MG/1
12.5 TABLET, EXTENDED RELEASE ORAL
Status: DISCONTINUED | OUTPATIENT
Start: 2018-02-23 | End: 2018-02-22 | Stop reason: HOSPADM

## 2018-02-22 RX ADMIN — ASPIRIN 81 MG: 81 TABLET, COATED ORAL at 09:21

## 2018-02-22 RX ADMIN — CEFTRIAXONE SODIUM 1 G: 10 INJECTION, POWDER, FOR SOLUTION INTRAVENOUS at 09:21

## 2018-02-22 RX ADMIN — TERAZOSIN HYDROCHLORIDE ANHYDROUS 5 MG: 5 CAPSULE ORAL at 09:22

## 2018-02-22 RX ADMIN — CLOPIDOGREL 75 MG: 75 TABLET, FILM COATED ORAL at 09:21

## 2018-02-22 RX ADMIN — POTASSIUM CHLORIDE 40 MEQ: 1500 TABLET, EXTENDED RELEASE ORAL at 09:21

## 2018-02-22 ASSESSMENT — PAIN SCALES - GENERAL
PAINLEVEL_OUTOF10: 0
PAINLEVEL_OUTOF10: 0

## 2018-02-22 ASSESSMENT — COGNITIVE AND FUNCTIONAL STATUS - GENERAL
MOBILITY SCORE: 23
DAILY ACTIVITIY SCORE: 23
SUGGESTED CMS G CODE MODIFIER MOBILITY: CI
CLIMB 3 TO 5 STEPS WITH RAILING: A LITTLE
SUGGESTED CMS G CODE MODIFIER DAILY ACTIVITY: CI
HELP NEEDED FOR BATHING: A LITTLE

## 2018-02-22 ASSESSMENT — ENCOUNTER SYMPTOMS
PND: 0
DIZZINESS: 0
MYALGIAS: 0
PALPITATIONS: 0
ORTHOPNEA: 0
COUGH: 0
ABDOMINAL PAIN: 0
SHORTNESS OF BREATH: 0
FEVER: 0
CLAUDICATION: 0

## 2018-02-22 ASSESSMENT — GAIT ASSESSMENTS
GAIT LEVEL OF ASSIST: SUPERVISED
DISTANCE (FEET): 150
ASSISTIVE DEVICE: FRONT WHEEL WALKER

## 2018-02-22 ASSESSMENT — ACTIVITIES OF DAILY LIVING (ADL): TOILETING: INDEPENDENT

## 2018-02-22 NOTE — PROGRESS NOTES
Cardiovascular Nurse Navigator ([id number]) Note:    Reviewed ACS/PCI medications:  Dual Antiplatelet Therapy (DAPT):  aspirin + clopidogrel  Beta-Blocker:  toprol XL   Statin:  hx of chronically elevated LFTs, need order--will address with rounding provider   ACEI/ARB:  losartan  Aldosterone blocking agent:   N/a--EF 65%    Intensive Cardiac Rehab (ICR) Referral:  Referred on 2/21; has current inpatient orders for nutrition consult & PT for Phase I ICR  ICR follow-up and contact info added to AVS:  yes    Cardiology Follow-Up:  Oscar WATSON 3/8      Inpatient & Discharge Patient Education:  Bedside nursing to continually provide patient education on ACS meds, signs and symptoms to monitor for, and risk factor modification. Also at discharge please complete the “ACS” special instructions on the AVS.  Thank you and please call with questions.

## 2018-02-22 NOTE — THERAPY
"Occupational Therapy Evaluation completed.   Functional Status:  spv w/bed mobility, spv w/sit>stand, spv w/LB dressing, spv w/grooming seated, completed sit>stand txf to chair using fww. Remained up in chair w/alarm on wife present and PT present for next session.   Plan of Care: Patient with no further skilled OT needs in the acute care setting at this time  Discharge Recommendations:  Equipment: No Equipment Needed. Post-acute therapy Discharge to home with outpatient or home health for additional skilled therapy services.    See \"Rehab Therapy-Acute\" Patient Summary Report for complete documentation.      88 yr old male admitted for near syncope, pt is dx w/NSTEMI and weakness, s/p coronary angiography w/left heart cath. Pt demonstrated ability to complete basic ADL's and txfs. Pt does have an elderly wife who can assist w/IADL's, they have weekly cleaning service and help from neighbors. Pt appears to be at/near baseline, recommend continued OOB activity and home w/HH.   "

## 2018-02-22 NOTE — THERAPY
"Physical Therapy Evaluation completed.   Bed Mobility:  Supine to Sit:  (NT, up with OT )  Transfers: Sit to Stand: Supervised  Gait: Level Of Assist: Supervised with Front-Wheel Walker ; stand by assist without FWW     Plan of Care: Will benefit from Physical Therapy 2 times per week  Discharge Recommendations: Equipment: No Equipment Needed.     Pt presents with impaired activity tolerance s/p NSTEMI. Pt able to initiate walking program at an RPE of 10, denies symptoms, steady HR throughout. Discussed follow up outpt cardiac rehab as it relates to modifiable cardiac risk factors; wife is eager to have him pariticipate, pt is indifferent. From a mobility perspective, balance intact with/without FWW; wife expresses concern for pt's memory, behaviors concerning for sundowning and may not remember to comply with walker. At this time, recommend home with follow up outpt cardiac rehab when appropriate in stage of recovery; does not need home health from a PT perspective but defer to OT from IADLs. Will follow if remains in house.     See \"Rehab Therapy-Acute\" Patient Summary Report for complete documentation.     "

## 2018-02-22 NOTE — PROGRESS NOTES
[facility] Hospitalist Progress Note    Date of Service: 2/21/2018    Chief Complaint  88 y.o. male admitted 2/19/2018 with no prior cardiac history presents with dizziness when stood up from sitting last night while at rest.  The wife stated it lasted 20 minutes and called EMS.      Interval Problem Update  2/20:  Continued increase in troponin with change in EKG with ST depression anterior leads and t wave inversions inferior leads compared to prior EKG.  Denies CP, but wife states he is stoic and will not state when something hurts.  Remains dizzy with sitting up.    2/21:  Seen prior to cath lab, lungs CTA, light headedness with walking from the bathroom noted on exam.  Cath with 98% stenosis mid LAD with PTCA, unable to stent due to heavy stenosis, reperfusion with 40% residual stenosis.  plavix added.  Consultants/Specialty  2/20:  Dr. Rodas consult appreciated.    Disposition  Home with wife.  PT/OT ordered.        Review of Systems   Constitutional: Negative for chills, diaphoresis, fever and malaise/fatigue.   HENT: Negative for congestion and sore throat.    Eyes: Negative for pain and discharge.   Respiratory: Negative for cough, hemoptysis, sputum production, shortness of breath and wheezing.    Cardiovascular: Negative for chest pain, palpitations, claudication and leg swelling.   Gastrointestinal: Negative for abdominal pain, constipation, diarrhea, melena, nausea and vomiting.   Genitourinary: Negative for dysuria, frequency and urgency.   Musculoskeletal: Negative for back pain, joint pain, myalgias and neck pain.   Skin: Negative for itching and rash.   Neurological: Positive for dizziness. Negative for sensory change, speech change, focal weakness, loss of consciousness, weakness and headaches.   Endo/Heme/Allergies: Does not bruise/bleed easily.   Psychiatric/Behavioral: Negative for depression, substance abuse and suicidal ideas.      Physical Exam  Laboratory/Imaging   Hemodynamics  Temp  (24hrs), Av.9 °C (98.4 °F), Min:36.6 °C (97.8 °F), Max:37.3 °C (99.2 °F)   Temperature: 36.8 °C (98.2 °F)  Pulse  Av.8  Min: 59  Max: 121    Blood Pressure : 133/64      Respiratory      Respiration: 18, Pulse Oximetry: 93 %, O2 Daily Delivery Respiratory : Room Air with O2 Available        RUL Breath Sounds: Clear, RML Breath Sounds: Clear, RLL Breath Sounds: Diminished, REBECCA Breath Sounds: Clear, LLL Breath Sounds: Diminished    Fluids    Intake/Output Summary (Last 24 hours) at 18 1902  Last data filed at 18 1800   Gross per 24 hour   Intake             1375 ml   Output              100 ml   Net             1275 ml       Nutrition  Orders Placed This Encounter   Procedures   • Diet Order     Standing Status:   Standing     Number of Occurrences:   1     Order Specific Question:   Diet:     Answer:   Cardiac [6]     Physical Exam   Constitutional: He is oriented to person, place, and time. He appears well-developed. No distress.   Elderly, frail   HENT:   Head: Normocephalic and atraumatic.   Mouth/Throat: Oropharynx is clear and moist. No oropharyngeal exudate.   Eyes: Conjunctivae and EOM are normal. Pupils are equal, round, and reactive to light. Right eye exhibits no discharge. Left eye exhibits no discharge. No scleral icterus.   Neck: Normal range of motion. Neck supple. No JVD present. No tracheal deviation present. No thyromegaly present.   Cardiovascular: Normal rate and regular rhythm.  Exam reveals no gallop and no friction rub.    Murmur (2/6 holosystolic) heard.  Pulmonary/Chest: Effort normal and breath sounds normal. No respiratory distress. He has no wheezes. He has no rales. He exhibits no tenderness.   Abdominal: Soft. Bowel sounds are normal. He exhibits no distension and no mass. There is no tenderness. There is no rebound and no guarding.   Musculoskeletal: Normal range of motion. He exhibits no edema or tenderness.   Lymphadenopathy:     He has no cervical adenopathy.    Neurological: He is alert and oriented to person, place, and time. No cranial nerve deficit. He exhibits normal muscle tone.   Skin: Skin is warm and dry. No rash noted. He is not diaphoretic. No erythema.   Psychiatric: He has a normal mood and affect. His behavior is normal. Judgment and thought content normal.   Nursing note and vitals reviewed.      Recent Labs      02/20/18   0007  02/20/18   1206  02/21/18   0044   WBC  11.3*  19.2*  9.9   RBC  3.65*  3.54*  3.36*   HEMOGLOBIN  11.1*  10.7*  10.5*   HEMATOCRIT  32.8*  32.5*  30.7*   MCV  89.9  91.8  91.4   MCH  30.4  30.2  31.3   MCHC  33.8  32.9*  34.2   RDW  45.6  48.6  47.9   PLATELETCT  152*  143*  116*   MPV  9.6  9.5  10.1     Recent Labs      02/20/18   0007  02/20/18   1206  02/21/18   0044   SODIUM  139  138  140   POTASSIUM  3.5*  3.7  3.5*   CHLORIDE  109  110  115*   CO2  19*  20  18*   GLUCOSE  133*  94  77   BUN  30*  26*  25*   CREATININE  0.94  0.76  0.66   CALCIUM  8.5  8.4*  7.9*     Recent Labs      02/20/18   1206  02/20/18   1845  02/21/18   0044  02/21/18   0710   APTT   --   123.4*  91.6*  69.8*   INR  1.24*   --    --    --                   Assessment/Plan     * Near syncope- (present on admission)   Assessment & Plan    From the patient's history, it does appear to be orthostatic in nature likely worsened by dehydration. No evidence of cardiac dysrhythmias. I will trend troponin levels.  He has no focal deficits.        Dehydration- (present on admission)   Assessment & Plan    No obvious reason for dehydration          Elevated troponin- (present on admission)   Assessment & Plan    Patient has no chest pains, + ST changes on his EKG by my read. I will continue to trend his troponin levels and monitor him on telemetry.  2/20 Dr. Rodas consulted.  No other reason for elevated troponin other than cardiac.  Patient and wife state will undergo cardiac cath.  Started heparin drip in addition to asa 325 daily since BP too low for  nitro or BB.  Remains with near syncope and hypotension only.  H/o AAA.  No obvious pain to suggest dissection.  2/21:  Extreme activity intolerance noted with ambulation 6 ft from BR prior to cath lab.  Due to NSTEMI        LFTs abnormal- (present on admission)   Assessment & Plan    Patient has a known history of gallbladder cancer status post resection and chronically elevated LFTs. His AST and AST are not significantly changed from his last available labs however he has developed hyperbilirubinemia. He had a stent placed in his liver one and a half years ago. He has not yet followed up with GI since. He denies any abdominal pain. I will repeat an abdominal ultrasound to see if there is any new underlying disease.  hepatitis panel negative. He will receive gentle fluids and will repeat chemistries in the morning.        NSTEMI (non-ST elevated myocardial infarction) (CMS-HCC)   Assessment & Plan    2/21:  6ft exercise intolerance noted prior to cath lab.  2/21:  Cath lab with 98% stenosis of LAD opened with PTCA to 40% stenosis, unable to stent due to fibrosis of vessel.  plavix added per cardiology.  Asa, lipitor.  dc'd heparin.   Add back beta blocker low dose.        Essential hypertension- (present on admission)   Assessment & Plan    Now with hypotension this a.m., dc'd Cozaar and Toprol.        Dyslipidemia- (present on admission)   Assessment & Plan    I'm holding the patient's home statin for now in light of his elevated LFTs, if they begin to trend down and we will consider repeat starting his medications.  Start fish oil daily.          Quality-Core Measures

## 2018-02-22 NOTE — DISCHARGE PLANNING
Care Transition Team Assessment    Sw met wit pt and pt's spouse at bedside.  Pt reports preferred pharmacy is [facility] on [address].  Pt and pt's spouse chose [facility] for walker and Sw called bedside nurse to contact traction for walker.  Pt and pt's spouse chose (1) [facility] and (2) [facility].  SW faxed both choice forms to [facility].      Information Source  Orientation : Oriented x 4  Information Given By: Patient  Informant's Name: Donato     Elopement Risk  Legal Hold: No  Ambulatory or Self Mobile in Wheelchair: Yes  Disoriented: No  Psychiatric Symptoms: None  History of Wandering: No  Elopement this Admit: No  Vocalizing Wanting to Leave: No  Displays Behaviors, Body Language Wanting to Leave: No-Not at Risk for Elopement  Elopement Risk: Not at Risk for Elopement    Interdisciplinary Discharge Planning  Does Admitting Nurse Feel This Could be a Complex Discharge?: No  Primary Care Physician: Dr. Fabiano Burk  Lives with - Patient's Self Care Capacity: Spouse  Support Systems: Family Member(s)  Housing / Facility: [address]  Do You Take your Prescribed Medications Regularly: Yes  Able to Return to Previous ADL's: Yes  Mobility Issues: No  Prior Services: None  Patient Expects to be Discharged to:: Home  Assistance Needed: No  Durable Medical Equipment: Not Applicable    Discharge Preparedness  What is your plan after discharge?: Home with help  What are your discharge supports?: Spouse    Vision / Hearing Impairment  Vision Impairment : Yes  Right Eye Vision: Impaired, Wears Glasses  Left Eye Vision: Impaired, Wears Glasses  Hearing Impairment : No    Values / Beliefs / Concerns  Values / Beliefs Concerns : No      Domestic Abuse  Have you ever been the victim of abuse or violence?: No  Physical Abuse or Sexual Abuse: No  Verbal Abuse or Emotional Abuse: No      Discharge Risks or Barriers  Discharge risks or barriers?: No    Anticipated Discharge Information  Anticipated discharge disposition: DME, HHC,  Home

## 2018-02-22 NOTE — DISCHARGE SUMMARY
CHIEF COMPLAINT ON ADMISSION  Chief Complaint   Patient presents with   • Near Syncopal       CODE STATUS  Full Code    HPI & HOSPITAL COURSE  This is a 88 y.o. male admitted 2/19/2018 with no prior cardiac history presents with dizziness when stood up from sitting last night while at rest.  The wife stated it lasted 20 minutes and called EMS.   He continued to have extreme fatigue, SOB with minimal ambulation while in the hospital, troponin elevation.  Dr. Rodas consulted, cath lab with PTCA of LAD mid vessel lesion from 98% stenosis to 40%, unable to place stent 2/2 fibrotic calcified vessel.  Placed on plavix in addition to aspirin daily.  The following day, the patient had more energy, no longer SOB, OT rec HH.  HH PT/OT set up with FWW.  Left arterial wrist site CD&I, no erythema.   No complications post cath.  Dc home, but watch LFTs on low dose lipitor since mildly elevated at baseline from his gallbladder cancer.    The patient met 2-midnight criteria for an inpatient stay at the time of discharge.    Therefore, he is discharged in good and stable condition with close outpatient follow-up.    SPECIFIC OUTPATIENT FOLLOW-UP  Follow up Dr. Rodas in 1-2 weeks.  Follow up PCP regarding repeat LFTs while on low dose lipitor.    DISCHARGE PROBLEM LIST  Principal Problem:    Near syncope POA: Yes  Active Problems:    LFTs abnormal POA: Yes    Elevated troponin POA: Yes    Dyslipidemia POA: Yes    Essential hypertension POA: Yes    Benign essential tremor POA: Yes    History of gallbladder cancer POA: Yes    NSTEMI (non-ST elevated myocardial infarction) (CMS-HCC) POA: Yes  Resolved Problems:    Dehydration POA: Yes      FOLLOW UP  Future Appointments  Date Time Provider Department Center   2/23/2018 1:20 PM CARE MANAGER RYAN [facility]ON[facility]   2/28/2018 1:00 PM EUGENIE [place]   3/7/2018 2:40 PM Fabiano Burk M.D. [address]   3/8/2018 9:40 AM ROSALINDA Moya [facility] None   4/16/2018  1:40 PM Michael J Bloch, M.D. [facility] None     Critical access hospital [facility]  [address]  [address]  [place] [address] [phone]    you have been referred to Cardiac Rehab, please call to schedule an appointment      MEDICATIONS ON DISCHARGE   Donato Burciaga   Home Medication Instructions ISRAEL:[id number]    Printed on:02/22/18 1417   Medication Information                      aspirin EC 81 MG EC tablet  Take 1 Tab by mouth every day.             atorvastatin (LIPITOR) 10 MG Tab  Take 1 Tab by mouth every bedtime.             clopidogrel (PLAVIX) 75 MG Tab  Take 1 Tab by mouth every day.             fluticasone (FLONASE) 50 MCG/ACT nasal spray  Spray 1 Spray in nose every day.             loratadine (CLARITIN) 10 MG Tab  Take 10 mg by mouth every day.             losartan (COZAAR) 50 MG Tab  Take 1 Tab by mouth every day.             MAGNESIUM PO  Take  by mouth.             metoprolol SR (TOPROL XL) 25 MG TABLET SR 24 HR  Take 0.5 Tabs by mouth every day.             Omega-3 Fatty Acids (FISH OIL) 1000 MG Cap capsule  Take 2 Caps by mouth every day.             potassium chloride SA (KDUR) 20 MEQ Tab CR  Take 1 Tab by mouth every day.             terazosin (HYTRIN) 5 MG Cap  TAKE 1 CAPSULE DAILY             Vitamin D, Cholecalciferol, 1000 UNITS Cap  Take  by mouth.                 DIET  Orders Placed This Encounter   Procedures   • Diet Order     Standing Status:   Standing     Number of Occurrences:   1     Order Specific Question:   Diet:     Answer:   Cardiac [6]       ACTIVITY  As tolerated.  Weight bearing as tolerated      CONSULTATIONS  Dr. Rodas    PROCEDURES  2/21 left heart cath:   CONCLUSIONS:  1. 98% mid LAD heavily calcified stenosis, culprit  2. Moderate nonobstructive CAD otherwise.  3. Successful PTCA mid LAD with 40% residual stenosis, unable to cross with any stents.     RECOMMENDATIONS:  Medical therapy with the addition of Plavix  Should he have recurrent angina then a  repeat procedure utilizing rotational atherectomy could be considered. However given his advanced age further discussions are warranted prior to this.       LABORATORY  Lab Results   Component Value Date/Time    SODIUM 140 02/22/2018 01:33 AM    POTASSIUM 3.5 (L) 02/22/2018 01:33 AM    CHLORIDE 112 02/22/2018 01:33 AM    CO2 22 02/22/2018 01:33 AM    GLUCOSE 87 02/22/2018 01:33 AM    BUN 16 02/22/2018 01:33 AM    CREATININE 0.64 02/22/2018 01:33 AM        Lab Results   Component Value Date/Time    WBC 6.1 02/22/2018 01:33 AM    HEMOGLOBIN 10.6 (L) 02/22/2018 01:33 AM    HEMATOCRIT 32.4 (L) 02/22/2018 01:33 AM    PLATELETCT 108 (L) 02/22/2018 01:33 AM        Total time of the discharge process exceeds 45 minutes

## 2018-02-22 NOTE — PROGRESS NOTES
Patient discharged home with home health and walker by the Physician.  Discharge instructions reviewed with patient including follow-up appointments, new medications, wound care, and signs/symptoms to watch for.  Patient and patient's wife verbalized understanding.   PIVs removed by RN with tip intact.  Patient escorted to lobby by nursing staff via wheel chair.

## 2018-02-22 NOTE — DISCHARGE PLANNING
Received choice form from ELIZA Riggs for HH. Referral sent to [facility] at 1048.

## 2018-02-22 NOTE — DISCHARGE INSTRUCTIONS
Discharge Instructions    Discharged to home by car with friend. Discharged via wheelchair, hospital escort: Yes.  Special equipment needed: Walker    Be sure to schedule a follow-up appointment with your primary care doctor or any specialists as instructed.     Discharge Plan:   Influenza Vaccine Indication: Not indicated: Previously immunized this influenza season and > 8 years of age    I understand that a diet low in cholesterol, fat, and sodium is recommended for good health. Unless I have been given specific instructions below for another diet, I accept this instruction as my diet prescription.   Other diet: Heart healthy    Special Instructions: Diagnosis:  Acute Coronary Syndrome (ACS) is a diagnosis that encompasses cardiac-related chest pain and heart attack. ACS occurs when the blood flow to the heart muscle is severely reduced or cut off completely due to a slow process called atherosclerosis.  Atherosclerosis is a disease in which the coronary arteries become narrow from a buildup of fat, cholesterol, and other substances that combine to form plaque. If the plaque breaks, a blood clot will form and block the blood flow to the heart muscle. This lack of blood flow can cause damage or death to the heart muscle which is called a heart attack or Myocardial Infarction (MI). There are two different types of MIs:  ST Elevation Myocardial Infarction or STEMI (the most severe type of heart attack) and Non-ST Elevation Myocardial Infarction or NSTEMI.    Treatment Plan:  · Cardiac Diet  - Low fat, low salt, low cholesterol   · Cardiac Rehab  - Your doctor has ordered you a referral to [facility].  Call [phone] to schedule an appointment.  · Attend my follow-up appointment with my Cardiologist.  · Take my medications as prescribed by my doctor  · Exercise daily  · Quit Smoking, Lower my bad cholesterol and raise my good cholesterol, lower my blood pressure and Reduce stress    Medications:  Certain medications  are used to treat ACS.  Remember to always take medications as prescribed and never stop talking medications unless told by your doctor.    You have been prescribed the following medicatons:    Aspirin - Aspirin is used as a blood thinning medication and you will require this medication indefinitely.  Anti-platelet/blood thinner - Your Anti-platelet/Blood thinning medication is called plavix, and is used in combination with aspirin to prevent clots from forming in your heart and/or around your stent.  Your doctor will determine how long you need to be on this medicine.  Beta-Blocker - Beta-Blocker metoprolol is used to lower blood pressure and heart rate, and/or helps your heart heal after a heart attack.  Statin - Statin atorvastatin is used to lower cholesterol.    · Is patient discharged on Warfarin / Coumadin?   No     Depression / Suicide Risk    As you are discharged from this [facility] facility, it is important to learn how to keep safe from harming yourself.    Recognize the warning signs:  · Abrupt changes in personality, positive or negative- including increase in energy   · Giving away possessions  · Change in eating patterns- significant weight changes-  positive or negative  · Change in sleeping patterns- unable to sleep or sleeping all the time   · Unwillingness or inability to communicate  · Depression  · Unusual sadness, discouragement and loneliness  · Talk of wanting to die  · Neglect of personal appearance   · Rebelliousness- reckless behavior  · Withdrawal from people/activities they love  · Confusion- inability to concentrate     If you or a loved one observes any of these behaviors or has concerns about self-harm, here's what you can do:  · Talk about it- your feelings and reasons for harming yourself  · Remove any means that you might use to hurt yourself (examples: pills, rope, extension cords, firearm)  · Get professional help from the community (Mental Health, Substance Abuse,  psychological counseling)  · Do not be alone:Call your Safe Contact- someone whom you trust who will be there for you.  · Call your local CRISIS HOTLINE 659-6080 or 181-404-2518  · Call your local Children's Mobile Crisis Response Team [place] (913) 222-8712 or www.[place]  · Call the toll free National Suicide Prevention Hotlines   · National Suicide Prevention Lifeline 309-996-SAVQ (2515)  · [employer] Line Network 800-SUICIDE (208-1481)    Coronary Angiogram  A coronary angiogram, also called coronary angiography, is an X-ray procedure used to look at the arteries in the heart. In this procedure, a dye (contrast dye) is injected through a long, hollow tube (catheter). The catheter is about the size of a piece of cooked spaghetti and is inserted through your groin, wrist, or arm. The dye is injected into each artery, and X-rays are then taken to show if there is a blockage in the arteries of your heart.  LET YOUR HEALTH CARE PROVIDER KNOW ABOUT:  · Any allergies you have, including allergies to shellfish or contrast dye.    · All medicines you are taking, including vitamins, herbs, eye drops, creams, and over-the-counter medicines.    · Previous problems you or members of your family have had with the use of anesthetics.    · Any blood disorders you have.    · Previous surgeries you have had.  · History of kidney problems or failure.    · Other medical conditions you have.  RISKS AND COMPLICATIONS   Generally, a coronary angiogram is a safe procedure. However, problems can occur and include:  · Allergic reaction to the dye.  · Bleeding from the access site or other locations.  · Kidney injury, especially in people with impaired kidney function.   · Stroke (rare).  · Heart attack (rare).  BEFORE THE PROCEDURE   · Do not eat or drink anything after midnight the night before the procedure or as directed by your health care provider.    · Ask your health care provider about changing or stopping your  regular medicines. This is especially important if you are taking diabetes medicines or blood thinners.  PROCEDURE  · You may be given a medicine to help you relax (sedative) before the procedure. This medicine is given through an intravenous (IV) access tube that is inserted into one of your veins.    · The area where the catheter will be inserted will be washed and shaved. This is usually done in the groin but may be done in the fold of your arm (near your elbow) or in the wrist.     · A medicine will be given to numb the area where the catheter will be inserted (local anesthetic).    · The health care provider will insert the catheter into an artery. The catheter will be guided by using a special type of X-ray (fluoroscopy) of the blood vessel being examined.    · A special dye will then be injected into the catheter, and X-rays will be taken. The dye will help to show where any narrowing or blockages are located in the heart arteries.    AFTER THE PROCEDURE   · If the procedure is done through the leg, you will be kept in bed lying flat for several hours. You will be instructed to not bend or cross your legs.  · The insertion site will be checked frequently.    · The pulse in your feet or wrist will be checked frequently.    · Additional blood tests, X-rays, and an electrocardiogram may be done.       This information is not intended to replace advice given to you by your health care provider. Make sure you discuss any questions you have with your health care provider.     Document Released: 06/23/2004 Document Revised: 01/08/2016 Document Reviewed: 05/12/2014  [employer] Interactive Patient Education ©2016 [employer]    Clopidogrel tablets  What is this medicine?  CLOPIDOGREL (kloh PID oh grel) helps to prevent blood clots. This medicine is used to prevent heart attack, stroke, or other vascular events in people who are at high risk.  This medicine may be used for other purposes; ask your health care provider  or pharmacist if you have questions.  COMMON BRAND NAME(S): Plavix  What should I tell my health care provider before I take this medicine?  They need to know if you have any of the following conditions:  -bleeding disorder  -bleeding in the brain  -planned surgery  -stomach or intestinal ulcers  -stroke or transient ischemic attack  -an unusual or allergic reaction to clopidogrel, other medicines, foods, dyes, or preservatives  -pregnant or trying to get pregnant  -breast-feeding  How should I use this medicine?  Take this medicine by mouth with a drink of water. Follow the directions on the prescription label. You may take this medicine with or without food. Take your medicine at regular intervals. Do not take your medicine more often than directed.  Talk to your pediatrician regarding the use of this medicine in children. Special care may be needed.  Overdosage: If you think you have taken too much of this medicine contact a poison control center or emergency room at once.  NOTE: This medicine is only for you. Do not share this medicine with others.  What if I miss a dose?  If you miss a dose, take it as soon as you can. If it is almost time for your next dose, take only that dose. Do not take double or extra doses.  What may interact with this medicine?  -aspirin  -blood thinners like cilostazol, enoxaparin, ticlopidine, and warfarin  -certain medicines for depression like citalopram, fluoxetine, and fluvoxamine  -certain medicines for fungal infections like ketoconazole, fluconazole, and voriconazole  -certain medicines for HIV infection like delavirdine, efavirenz, and etravirine  -certain medicines for seizures like felbamate, oxcarbazepine, and phenytoin  -chloramphenicol  -fluvastatin  -isoniazid, INH  -medicines for inflammation like ibuprofen and naproxen  -modafinil  -nicardipine  -over-the counter supplements like echinacea, feverfew, fish oil, garlic, kelsey, ginkgo, green tea, horse  chestnut  -quinine  -stomach acid blockers like cimetidine, omeprazole, and esomeprazole  -tamoxifen  -tolbutamide  -topiramate  -torsemide  This list may not describe all possible interactions. Give your health care provider a list of all the medicines, herbs, non-prescription drugs, or dietary supplements you use. Also tell them if you smoke, drink alcohol, or use illegal drugs. Some items may interact with your medicine.  What should I watch for while using this medicine?  Visit your doctor or health care professional for regular check ups. Do not stop taking your medicine unless your doctor tells you to.  If you are going to have surgery or dental work, tell your doctor or health care professional that you are taking this medicine.  Certain genetic factors may reduce the effect of this medicine. Your doctor may use genetic tests to determine treatment.  What side effects may I notice from receiving this medicine?  Side effects that you should report to your doctor or health care professional as soon as possible:  -allergic reactions like skin rash, itching or hives, swelling of the face, lips, or tongue  -black, tarry stools  -blood in urine or vomit  -breathing problems  -changes in vision  -fever  -sudden weakness  -unusual bleeding or bruising  Side effects that usually do not require medical attention (report to your doctor or health care professional if they continue or are bothersome):  -constipation or diarrhea  -headache  -pain in back or joints  -stomach upset  This list may not describe all possible side effects. Call your doctor for medical advice about side effects. You may report side effects to FDA at 9-894-FDA-8888.  Where should I keep my medicine?  Keep out of the reach of children.  Store at room temperature of 59 to 86 degrees F (15 to 30 degrees C). Throw away any unused medicine after the expiration date.  NOTE: This sheet is a summary. It may not cover all possible information. If you have  questions about this medicine, talk to your doctor, pharmacist, or health care provider.  © 2014, Elsevier/[employer]. (6/8/2010 9:41:49 AM)  Atorvastatin tablets  What is this medicine?  ATORVASTATIN (a TORE va sta tin) is known as a HMG-CoA reductase inhibitor or 'statin'. It lowers the level of cholesterol and triglycerides in the blood. This drug may also reduce the risk of heart attack, stroke, or other health problems in patients with risk factors for heart disease. Diet and lifestyle changes are often used with this drug.  This medicine may be used for other purposes; ask your health care provider or pharmacist if you have questions.  COMMON BRAND NAME(S): Lipitor  What should I tell my health care provider before I take this medicine?  They need to know if you have any of these conditions:  -frequently drink alcoholic beverages  -history of stroke, TIA  -kidney disease  -liver disease  -muscle aches or weakness  -other medical condition  -an unusual or allergic reaction to atorvastatin, other medicines, foods, dyes, or preservatives  -pregnant or trying to get pregnant  -breast-feeding  How should I use this medicine?  Take this medicine by mouth with a glass of water. Follow the directions on the prescription label. You can take this medicine with or without food. Take your doses at regular intervals. Do not take your medicine more often than directed.  Talk to your pediatrician regarding the use of this medicine in children. While this drug may be prescribed for children as young as 10 years old for selected conditions, precautions do apply.  Overdosage: If you think you have taken too much of this medicine contact a poison control center or emergency room at once.  NOTE: This medicine is only for you. Do not share this medicine with others.  What if I miss a dose?  If you miss a dose, take it as soon as you can. If it is almost time for your next dose, take only that dose. Do not take double or extra  doses.  What may interact with this medicine?  Do not take this medicine with any of the following medications:  -red yeast rice  -telaprevir  -telithromycin  -voriconazole  This medicine may also interact with the following medications:  -alcohol  -antiviral medicines for HIV or AIDS  -boceprevir  -certain antibiotics like clarithromycin, erythromycin, troleandomycin  -certain medicines for cholesterol like fenofibrate or gemfibrozil  -cimetidine  -clarithromycin  -colchicine  -cyclosporine  -digoxin  -female hormones, like estrogens or progestins and birth control pills  -grapefruit juice  -medicines for fungal infections like fluconazole, itraconazole, ketoconazole  -niacin  -rifampin  -spironolactone  This list may not describe all possible interactions. Give your health care provider a list of all the medicines, herbs, non-prescription drugs, or dietary supplements you use. Also tell them if you smoke, drink alcohol, or use illegal drugs. Some items may interact with your medicine.  What should I watch for while using this medicine?  Visit your doctor or health care professional for regular check-ups. You may need regular tests to make sure your liver is working properly.  Tell your doctor or health care professional right away if you get any unexplained muscle pain, tenderness, or weakness, especially if you also have a fever and tiredness. Your doctor or health care professional may tell you to stop taking this medicine if you develop muscle problems. If your muscle problems do not go away after stopping this medicine, contact your health care professional.  This drug is only part of a total heart-health program. Your doctor or a dietician can suggest a low-cholesterol and low-fat diet to help. Avoid alcohol and smoking, and keep a proper exercise schedule.  Do not use this drug if you are pregnant or breast-feeding. Serious side effects to an unborn child or to an infant are possible. Talk to your doctor or  pharmacist for more information.  This medicine may affect blood sugar levels. If you have diabetes, check with your doctor or health care professional before you change your diet or the dose of your diabetic medicine.  If you are going to have surgery tell your health care professional that you are taking this drug.  What side effects may I notice from receiving this medicine?  Side effects that you should report to your doctor or health care professional as soon as possible:  -allergic reactions like skin rash, itching or hives, swelling of the face, lips, or tongue  -dark urine  -fever  -joint pain  -muscle cramps, pain  -redness, blistering, peeling or loosening of the skin, including inside the mouth  -trouble passing urine or change in the amount of urine  -unusually weak or tired  -yellowing of eyes or skin  Side effects that usually do not require medical attention (report to your doctor or health care professional if they continue or are bothersome):  -constipation  -heartburn  -stomach gas, pain, upset  This list may not describe all possible side effects. Call your doctor for medical advice about side effects. You may report side effects to FDA at 3-631-FDA-3516.  Where should I keep my medicine?  Keep out of the reach of children.  Store at room temperature between 20 to 25 degrees C (68 to 77 degrees F). Throw away any unused medicine after the expiration date.  NOTE: This sheet is a summary. It may not cover all possible information. If you have questions about this medicine, talk to your doctor, pharmacist, or health care provider.  © 2014, [employer]. (11/6/2012 9:18:24 AM)    Aspirin and Your Heart   Aspirin is a medicine that affects the way blood clots. Aspirin can be used to help reduce the risk of blood clots, heart attacks, and other heart-related problems.   SHOULD I TAKE ASPIRIN?  Your health care provider will help you determine whether it is safe and beneficial for you to take  aspirin daily. Taking aspirin daily may be beneficial if you:  · Have had a heart attack or chest pain.  · Have undergone open heart surgery such as coronary artery bypass surgery (CABG).  · Have had coronary angioplasty.  · Have experienced a stroke or transient ischemic attack (TIA).  · Have peripheral vascular disease (PVD).  · Have chronic heart rhythm problems such as atrial fibrillation.  ARE THERE ANY RISKS OF TAKING ASPIRIN DAILY?  Daily use of aspirin can increase your risk of side effects. Some of these include:  · Bleeding. Bleeding problems can be minor or serious. An example of a minor problem is a cut that does not stop bleeding. An example of a more serious problem is stomach bleeding or bleeding into the brain. Your risk of bleeding is increased if you are also taking non-steroidal anti-inflammatory medicine (NSAIDs).  · Increased bruising.  · Upset stomach.  · An allergic reaction. People who have nasal polyps have an increased risk of developing an aspirin allergy.  WHAT ARE SOME GUIDELINES I SHOULD FOLLOW WHEN TAKING ASPIRIN?   · Take aspirin only as directed by your health care provider. Make sure you understand how much you should take and what form you should take. The two forms of aspirin are:  ¨ Non-enteric-coated. This type of aspirin does not have a coating and is absorbed quickly. Non-enteric-coated aspirin is usually recommended for people with chest pain. This type of aspirin also comes in a chewable form.  ¨ Enteric-coated. This type of aspirin has a special coating that releases the medicine very slowly. Enteric-coated aspirin causes less stomach upset than non-enteric-coated aspirin. This type of aspirin should not be chewed or crushed.  · Drink alcohol in moderation. Drinking alcohol increases your risk of bleeding.  WHEN SHOULD I SEEK MEDICAL CARE?   · You have unusual bleeding or bruising.  · You have stomach pain.  · You have an allergic reaction. Symptoms of an allergic reaction  include:  ¨ Hives.  ¨ Itchy skin.  ¨ Swelling of the lips, tongue, or face.  · You have ringing in your ears.  WHEN SHOULD I SEEK IMMEDIATE MEDICAL CARE?   · Your bowel movements are bloody, dark red, or black in color.  · You vomit or cough up blood.  · You have blood in your urine.  · You cough, wheeze, or feel short of breath.  If you have any of the following symptoms, this is an emergency. Do not wait to see if the pain will go away. Get medical help at once. Call your local emergency services (911 in the [place] Do not drive yourself to the hospital.  · You have severe chest pain, especially if the pain is crushing or pressure-like and spreads to the arms, back, neck, or jaw.   · You have stroke-like symptoms, such as:    ¨ Loss of vision.    ¨ Difficulty talking.    ¨ Numbness or weakness on one side of your body.    ¨ Numbness or weakness in your arm or leg.    ¨ Not thinking clearly or feeling confused.       This information is not intended to replace advice given to you by your health care provider. Make sure you discuss any questions you have with your health care provider.     Document Released: 11/30/2009 Document Revised: 01/08/2016 Document Reviewed: 03/25/2015  Elsevier Interactive Patient Education ©2016 [employer].

## 2018-02-22 NOTE — FACE TO FACE
Face to Face Note  -  Durable Medical Equipment    Arianna Deutsch M.D. - NPI: [id number]  I certify that this patient is under my care and that they have had a durable medical equipment(DME)face to face encounter by myself that meets the physician DME face-to-face encounter requirements with this patient on:    Date of encounter:   Patient:                    MRN:                       YOB: 2018  Donato Burciaga  [id number]  5/27/1929     The encounter with the patient was in whole, or in part, for the following medical condition, which is the primary reason for durable medical equipment:  Cardiac Disease    I certify that, based on my findings, the following durable medical equipment is medically necessary:  Walkers.    HOME O2 Saturation Measurements:(Values must be present for Home Oxygen orders)         ,     ,         My Clinical findings support the need for the above equipment due to:  Abnormal Gait    Supporting Symptoms: generalized weakness s/p cardiac catheterization for CAD.     ------------------------------------------------------------------------------------------------------------------    Face to Face Supporting Documentation - Home Health    The encounter with this patient was in whole or in part the primary reason for home health admission.    Date of encounter:   Patient:                    MRN:                       YOB: 2018  Donato Burciaga  [id number]  5/27/1929     Home health to see patient for:  Skilled Nursing care for assessment, interventions & education, Physical Therapy evaluation and treatment and Occupational therapy evaluation and treatment    Skilled need for:  New Onset Medical Diagnosis CAD on cardiac cath s/p PTCA    Skilled nursing interventions to include:  Comment: home safety    Homebound evidenced status by:  Need the aid of supportive devices such as crutches, canes, wheelchairs or walkers. Leaving home must require a  considerable and taxing effort. There must exist a normal inability to leave the home.    Community Physician to provide follow up care: Fabiano Burk M.D.     Optional Interventions    Wound information & treatment:    Home Infusion Therapy orders:    Line/Drain/Airway:    I certify the face to face encounter for this home care referral meets the CMS requirements and the encounter/clinical assessment with the patient was, in whole, or in part, for the medical condition(s) listed above, which is the primary reason for home health care. Based on my clinical findings: the service(s) are medically necessary, support the need for home health care, and the homebound criteria are met.  I certify that this patient has had a face to face encounter by myself.  Arianna Deutsch M.D. - NPI: [id number]    *Debility, frailty and advanced age in the absence of an acute deterioration or exacerbation of a condition do not qualify a patient for home health.

## 2018-02-22 NOTE — DISCHARGE PLANNING
ATTN: Case Management  RE: Referral for Home Health                We would like to take this opportunity to thank you for submitting a referral for your patient to continue care with [facility]. Our skilled team is dedicated to helping all patients recover and gain independence in the home setting.            As of 2/22/2018, we have accepted the above patient into our service. A [facility] clinician will be out to see the patient within 48 hours to conduct our initial visit. If you have any questions or concerns regarding the patient’s transition to Home Health, please do not hesitate to contact us. We are open for referrals 7 days a week from 8AM to 5PM at [phone].      We look forward to collaborating with you,  [facility] Team

## 2018-02-22 NOTE — ASSESSMENT & PLAN NOTE
2/21:  6ft exercise intolerance noted prior to cath lab.  2/21:  Cath lab with 98% stenosis of LAD opened with PTCA to 40% stenosis, unable to stent due to fibrosis of vessel.  plavix added per cardiology.  Asa, lipitor.  dc'd heparin.   Add back beta blocker low dose.

## 2018-02-22 NOTE — PROGRESS NOTES
Cardiology Progress Note               Author: Cassandra Tavares DNP Date & Time created: 2018  11:46 AM     Interval History:  88 Year old with hx of HTN and HLD, presented with lightheadedness, malaise, and fatigue. No chest pain noted. He was found to have an elevated troponin on admission.     : cold but feels good, wants to go home    Telemetry: SB SR 53-93    Chief Complaint:  Weakness and lightheadedness    Review of Systems   Constitutional: Negative for fever and malaise/fatigue.   Respiratory: Negative for cough and shortness of breath.    Cardiovascular: Negative for chest pain, palpitations, orthopnea, claudication, leg swelling and PND.   Gastrointestinal: Negative for abdominal pain.   Musculoskeletal: Negative for myalgias.   Neurological: Negative for dizziness.   All other systems reviewed and are negative.      Physical Exam   Constitutional: He is oriented to person, place, and time. He appears well-developed and well-nourished. No distress.   HENT:   Head: Normocephalic and atraumatic.   Eyes: EOM are normal.   Neck: Normal range of motion. No JVD present.   Cardiovascular: Normal rate, regular rhythm, normal heart sounds and intact distal pulses.    No murmur heard.  Pulmonary/Chest: Effort normal and breath sounds normal. No respiratory distress. He has no wheezes. He has no rales.   Abdominal: Soft. Bowel sounds are normal.   Musculoskeletal: Normal range of motion. He exhibits no edema.   Neurological: He is alert and oriented to person, place, and time.   Skin: Skin is warm and dry.   Psychiatric: He has a normal mood and affect.   Nursing note and vitals reviewed.      Hemodynamics:  Temp (24hrs), Av.7 °C (98 °F), Min:36.2 °C (97.2 °F), Max:36.9 °C (98.5 °F)  Temperature: 36.9 °C (98.5 °F)  Pulse  Av.7  Min: 59  Max: 121   Blood Pressure : 156/74     Respiratory:    Respiration: 16, Pulse Oximetry: 94 %, O2 Daily Delivery Respiratory : Room Air with O2 Available         RUL Breath Sounds: Clear, RML Breath Sounds: Clear, RLL Breath Sounds: Diminished, REBECCA Breath Sounds: Clear, LLL Breath Sounds: Diminished  Fluids:     Weight: 65.5 kg (144 lb 6.4 oz)  GI/Nutrition:  Orders Placed This Encounter   Procedures   • Diet Order     Standing Status:   Standing     Number of Occurrences:   1     Order Specific Question:   Diet:     Answer:   Cardiac [6]     Lab Results:  Recent Labs      02/20/18   1206  02/21/18   0044  02/22/18   0133   WBC  19.2*  9.9  6.1   RBC  3.54*  3.36*  3.57*   HEMOGLOBIN  10.7*  10.5*  10.6*   HEMATOCRIT  32.5*  30.7*  32.4*   MCV  91.8  91.4  90.8   MCH  30.2  31.3  29.7   MCHC  32.9*  34.2  32.7*   RDW  48.6  47.9  46.7   PLATELETCT  143*  116*  108*   MPV  9.5  10.1  10.0     Recent Labs      02/20/18   1206  02/21/18   0044  02/22/18   0133   SODIUM  138  140  140   POTASSIUM  3.7  3.5*  3.5*   CHLORIDE  110  115*  112   CO2  20  18*  22   GLUCOSE  94  77  87   BUN  26*  25*  16   CREATININE  0.76  0.66  0.64   CALCIUM  8.4*  7.9*  7.9*     Recent Labs      02/20/18   1206  02/20/18   1845  02/21/18   0044  02/21/18   0710   APTT   --   123.4*  91.6*  69.8*   INR  1.24*   --    --    --          Recent Labs      02/20/18   2300  02/21/18   0044  02/21/18   0710   TROPONINI  3.93*  4.37*  3.28*             Medical Decision Making, by Problem:  Active Hospital Problems    Diagnosis   • *Near syncope [R55]   • Dehydration [E86.0]   • Elevated troponin [R74.8]   • LFTs abnormal [R79.89]   • NSTEMI (non-ST elevated myocardial infarction) (CMS-HCC) [I21.4]   • Essential hypertension [I10]   • Dyslipidemia [E78.5]     Plan:  1. Near syncope with elevated troponin (NSTEMI)  -sent for LHC yest with LAD occlusion found  -unable to stent, consider outpatient arthrectomy   -cp free and feels good, no lightheadedness  -ambulate before dc  -cont asa, plavix, statin, and bb    2. LFT's abnormal with hx of biliary stent  -statin added  -follow up with lipid and CMP in 6  weeks  -LDL goal <70 with CAD    3. HTN  -moderate control  -increase losartan to 50 mg QD, cont bb    4. HLD  -statin added    We will sign off. He is safe for discharge home as long as he can ambulate without any concerning symptoms. We will arrange follow up in our office.   Quality-Core Measures   Reviewed items::  EKG reviewed, Radiology images reviewed, Medications reviewed and Labs reviewed  Adams catheter::  No Adams  DVT prophylaxis pharmacological::  Not indicated at this time, ambulatory  DVT prophylaxis - mechanical:  Not indicated at this time, ambulatory  Ulcer Prophylaxis::  Not indicated

## 2018-02-22 NOTE — CARE PLAN
Problem: Safety  Goal: Will remain free from injury  Outcome: PROGRESSING AS EXPECTED  Bed locked and in lowest position. Bed alarm on. Treaded socks. Call light and belongings within reach. Patient educated to call for assistance. Pt verbalized understanding. Hourly rounding in place.      Problem: Knowledge Deficit  Goal: Knowledge of disease process/condition, treatment plan, diagnostic tests, and medications will improve  Outcome: PROGRESSING AS EXPECTED  Discussed POC and medications with patient, pt. verbalized understanding.

## 2018-02-22 NOTE — PROGRESS NOTES
Bedside report received by day STEFFANY Rodgers. Patient sitting up in bed watching TV at this time. POC discussed, verbalized understanding. No immediate concerns for patient at this time. All safety measures in place.

## 2018-02-23 ENCOUNTER — PATIENT OUTREACH (OUTPATIENT)
Dept: HEALTH INFORMATION MANAGEMENT | Facility: OTHER | Age: 83
End: 2018-02-23

## 2018-02-23 NOTE — PROGRESS NOTES
Patient's wife called stating that she was unable to find patient's wallet.  Patient's room had not been cleaned since discharge so this RN checked room and all cabinets, drawers, medication drawer, locked belongings cabinet in case coordination office, and called safekeeping.  Unable to locate.  Called patient's wife to inform her and notified her that somebody would contact her if wallet was found.  Wife verbalized understanding and stated that she would keep looking through his belongings at home.  Charge RN notified.

## 2018-02-26 ENCOUNTER — HOME CARE VISIT (OUTPATIENT)
Dept: HOME HEALTH SERVICES | Facility: HOME HEALTHCARE | Age: 83
End: 2018-02-26
Payer: MEDICARE

## 2018-02-26 PROCEDURE — G0493 RN CARE EA 15 MIN HH/HOSPICE: HCPCS

## 2018-02-26 PROCEDURE — 665001 SOC-HOME HEALTH

## 2018-02-26 PROCEDURE — 665998 HH PPS REVENUE CREDIT

## 2018-02-26 PROCEDURE — 665999 HH PPS REVENUE DEBIT

## 2018-02-26 RX ORDER — ATORVASTATIN CALCIUM 10 MG/1
10 TABLET, FILM COATED ORAL
Qty: 90 TAB | Refills: 3 | Status: SHIPPED | OUTPATIENT
Start: 2018-02-26 | End: 2018-03-07 | Stop reason: SDUPTHER

## 2018-02-27 ENCOUNTER — PATIENT OUTREACH (OUTPATIENT)
Dept: HEALTH INFORMATION MANAGEMENT | Facility: OTHER | Age: 83
End: 2018-02-27

## 2018-02-27 ENCOUNTER — TELEPHONE (OUTPATIENT)
Dept: HEALTH INFORMATION MANAGEMENT | Facility: OTHER | Age: 83
End: 2018-02-27

## 2018-02-27 VITALS
WEIGHT: 170.2 LBS | SYSTOLIC BLOOD PRESSURE: 128 MMHG | BODY MASS INDEX: 27.35 KG/M2 | TEMPERATURE: 98.5 F | RESPIRATION RATE: 20 BRPM | DIASTOLIC BLOOD PRESSURE: 58 MMHG | OXYGEN SATURATION: 99 % | HEIGHT: 66 IN | HEART RATE: 59 BPM

## 2018-02-27 PROCEDURE — 665999 HH PPS REVENUE DEBIT

## 2018-02-27 PROCEDURE — 665998 HH PPS REVENUE CREDIT

## 2018-02-27 SDOH — ECONOMIC STABILITY: HOUSING INSECURITY: HOME SAFETY: PT HAS SEVERAL THROW RUGS, AND A SMALL DOG THAT CREATES A POTENTIAL RISK AS A TRIP/SLIP HAZARD.

## 2018-02-27 SDOH — ECONOMIC STABILITY: HOUSING INSECURITY: UNSAFE COOKING RANGE AREA: 0

## 2018-02-27 SDOH — ECONOMIC STABILITY: HOUSING INSECURITY: UNSAFE APPLIANCES: 0

## 2018-02-27 ASSESSMENT — ACTIVITIES OF DAILY LIVING (ADL): HOME_HEALTH_OASIS: 01

## 2018-02-27 ASSESSMENT — ENCOUNTER SYMPTOMS
NAUSEA: DENIES
VOMITING: DENIES

## 2018-02-27 ASSESSMENT — PATIENT HEALTH QUESTIONNAIRE - PHQ9
1. LITTLE INTEREST OR PLEASURE IN DOING THINGS: 00
2. FEELING DOWN, DEPRESSED, IRRITABLE, OR HOPELESS: 00

## 2018-02-27 NOTE — TELEPHONE ENCOUNTER
Medications reviewed against discharge summary. No clinically significant drug interactions noted.     Rain Blackwood, PharmD

## 2018-02-27 NOTE — PROGRESS NOTES
Received referral from [facility] for med rec. Medications reviewed against discharge summary. No clinically significant drug interactions noted.     Rain Blackwood, ElzbietaD

## 2018-02-28 ENCOUNTER — OFFICE VISIT (OUTPATIENT)
Dept: MEDICAL GROUP | Facility: CLINIC | Age: 83
End: 2018-02-28
Payer: MEDICARE

## 2018-02-28 VITALS
HEIGHT: 69 IN | RESPIRATION RATE: 16 BRPM | SYSTOLIC BLOOD PRESSURE: 94 MMHG | OXYGEN SATURATION: 96 % | HEART RATE: 80 BPM | BODY MASS INDEX: 20.14 KG/M2 | DIASTOLIC BLOOD PRESSURE: 58 MMHG | WEIGHT: 136 LBS | TEMPERATURE: 98.3 F

## 2018-02-28 DIAGNOSIS — I21.4 NSTEMI (NON-ST ELEVATED MYOCARDIAL INFARCTION) (HCC): ICD-10-CM

## 2018-02-28 DIAGNOSIS — Z09 HOSPITAL DISCHARGE FOLLOW-UP: ICD-10-CM

## 2018-02-28 DIAGNOSIS — R79.89 LFTS ABNORMAL: ICD-10-CM

## 2018-02-28 PROCEDURE — 665999 HH PPS REVENUE DEBIT

## 2018-02-28 PROCEDURE — 99495 TRANSJ CARE MGMT MOD F2F 14D: CPT | Performed by: NURSE PRACTITIONER

## 2018-02-28 PROCEDURE — 665998 HH PPS REVENUE CREDIT

## 2018-02-28 ASSESSMENT — ENCOUNTER SYMPTOMS
VOMITING: 0
COUGH: 0
NERVOUS/ANXIOUS: 0
SPUTUM PRODUCTION: 0
CHILLS: 0
HEADACHES: 0
DIZZINESS: 0
WHEEZING: 0
FEVER: 0
DEPRESSION: 0
HEARTBURN: 0
WEAKNESS: 0
ABDOMINAL PAIN: 0
FOCAL WEAKNESS: 0
FALLS: 0
MYALGIAS: 0
PALPITATIONS: 0
NAUSEA: 0
SHORTNESS OF BREATH: 0

## 2018-02-28 NOTE — PROGRESS NOTES
Subjective:     Donato Burciaga is a 88 y.o. male who presents for Hospital Follow-up.  Chart reviewed. Discharge summary available for review: Yes   Date of discharge 2/22/2018.  48- hour post discharge RN call completed on 2/23/2018 and documented in the medical record by Marcela Quintero..    HPI: Recently hospitalized for near syncope, s/p selective angiography with PTCA of LAD mid vessel lesion from 98% stenosis to 40%, unable to place stent 2/2 fibrotic calcified vessel. Add plavix in addition to aspirin, started lipitor, Toprol XL 12.5, losartan 50.   Echo grade II diastolic dysfunction. LVEF 65%.  Cardiologist follow up on 3/8.     Since returning home, patient reports feeling better, occasional dizziness but nothing like the one prior to discharge. He was discharged to TOPROL XL 12.5 mg but he was not realized he is taking 25 mg until one day ago. He is now back to 12.5 mg as instructed. He is noted to have low BP 94/58 but he is feeling ok at this time.     The patient denied increased weakness; somewhat difficulty taking care of self at home. Ambulate with FWW and discharged with .     PCP follow up appointment: 3/7    Patient Active Problem List    Diagnosis Date Noted   • Near syncope 02/20/2018     Priority: High   • Elevated troponin 02/20/2018     Priority: Medium   • LFTs abnormal 12/06/2017     Priority: Medium   • NSTEMI (non-ST elevated myocardial infarction) (CMS-HCC) 02/21/2018   • History of gallbladder cancer 11/09/2017   • Benign essential tremor 11/03/2016   • Anemia 02/09/2016   • IGT (impaired glucose tolerance) 11/30/2015   • Benign non-nodular prostatic hyperplasia with lower urinary tract symptoms 08/03/2015   • Dyslipidemia 01/26/2015   • Essential hypertension 01/26/2015         Allergies:   Patient has no known allergies.    Social History:  Social History   Substance Use Topics   • Smoking status: Former Smoker     Years: 0.00     Types: Cigarettes     Quit date: 1/1/1960   •  "Smokeless tobacco: Never Used   • Alcohol use 1.8 oz/week     3 Glasses of wine per week      Comment: 1 per day        ROS:  Review of Systems   Constitutional: Positive for malaise/fatigue. Negative for chills and fever.   HENT: Negative for hearing loss.    Respiratory: Negative for cough, sputum production, shortness of breath and wheezing.    Cardiovascular: Negative for chest pain, palpitations and leg swelling.   Gastrointestinal: Negative for abdominal pain, heartburn, nausea and vomiting.   Genitourinary: Negative for dysuria, frequency and urgency.   Musculoskeletal: Negative for falls and myalgias.   Skin: Negative for rash.   Neurological: Negative for dizziness, focal weakness, weakness and headaches.   Psychiatric/Behavioral: Negative for depression. The patient is not nervous/anxious.         Objective:     Blood pressure (!) 94/58, pulse 80, temperature 36.8 °C (98.3 °F), resp. rate 16, height 1.753 m (5' 9\"), weight 61.7 kg (136 lb), SpO2 96 %.     Physical Exam:  Physical Exam   Constitutional: He is oriented to person, place, and time and well-developed, well-nourished, and in no distress.   HENT:   Head: Normocephalic and atraumatic.   Eyes: Conjunctivae are normal.   Neck: Neck supple. No JVD present.   Cardiovascular: Normal rate and regular rhythm.    No murmur heard.  Pulmonary/Chest: Effort normal and breath sounds normal. No respiratory distress.   Abdominal: Soft. Bowel sounds are normal. He exhibits no distension. There is no tenderness. There is no rebound.   Musculoskeletal: He exhibits no edema.   Neurological: He is alert and oriented to person, place, and time. Gait normal.   Gait slow but steady, ambulate with FWW.   Skin: Skin is warm.   Vitals reviewed.        Assessment and Plan:     1. Hospital discharge follow-up  Hospitalization and results reviewed with patient. High risk conditions requiring teaching or care coordination were identified and addressed.The patient demonstrate " "understanding of admission and underlying conditions. The patient understands discharge instructions and when to seek medical attention. Medications reviewed including instructions regarding high risk medications, dosing and side effects.    The patient is able to safely adhere to ADL/IADL, treatment and medication regimen, self-manage of high-risk conditions? No   The patient requires physical therapy/home health/DME referral? Yes   The patient requires referral to care coordination/behavioral health/social work?  No   Patient requires referral for pharmacy consult? No   Advance directive/POLST on file?  No   Required counseled on advance directive?  No pt has copy at home and reports giving copy to pcp in the past. No AD on file. Pt is instructed to bring another copy to PCP on next visit (3/7). Pt's wife reports that they are also interested in having \"pink one\" done. Form is provided to pt to take a look at first and he can discuss with PCP in the future.     2. NSTEMI (non-ST elevated myocardial infarction) (CMS-HCC)  On asipirin, plavix, statin, losartan, toprolol sr (25 mg pill but he is taking half pill as instructed from hospital now. He is to discuss with cardiologist on 3/8 to further adjust medication accordingly. BP log is provided to pt. If pt will continue to take 12.5 mg only, consider to change lopressor since SR is not recommended to be cut half.     BP log is provided to pt. He is instructed on how to monitor his BP and HR. Bring log to cards next visit.     3. LFTs abnormal  No lab order given today since pt is going to see PCP soon on 3/7 and it is too soon to follow up LFT. Pt is to follow up LFT in 4-6 weeks.         Medication Reconciliation  Medication list at end of encounter:   Current Outpatient Prescriptions   Medication Sig Dispense Refill   • atorvastatin (LIPITOR) 10 MG Tab Take 1 Tab by mouth every bedtime. 90 Tab 3   • aspirin EC 81 MG EC tablet Take 1 Tab by mouth every day. 30 Tab " 3   • losartan (COZAAR) 50 MG Tab Take 1 Tab by mouth every day. 30 Tab 3   • metoprolol SR (TOPROL XL) 25 MG TABLET SR 24 HR Take 0.5 Tabs by mouth every day. 30 Tab 3   • clopidogrel (PLAVIX) 75 MG Tab Take 1 Tab by mouth every day. 30 Tab 3   • Omega-3 Fatty Acids (FISH OIL) 1000 MG Cap capsule Take 2 Caps by mouth every day. 60 Cap 3   • potassium chloride SA (KDUR) 20 MEQ Tab CR Take 1 Tab by mouth every day. 30 Tab 0   • fluticasone (FLONASE) 50 MCG/ACT nasal spray Spray 1 Spray in nose every day. 3 Bottle 3   • terazosin (HYTRIN) 5 MG Cap TAKE 1 CAPSULE DAILY 90 Cap 3   • MAGNESIUM PO Take 250 mg by mouth 1 time daily as needed (supplement).     • Vitamin D, Cholecalciferol, 1000 UNITS Cap Take 2,000 Units by mouth every day at 6 PM.     • loratadine (CLARITIN) 10 MG Tab Take 10 mg by mouth every day.       No current facility-administered medications for this visit.        Primary care follow-up:  New health conditions identified during hospitalization? Yes   Labs/pathology/imaging requires future PCP follow-up?  No   Changes to medications during hospitalization or today? Yes during hospitalization.    Recommended followup: Return in about 4 weeks (around 3/28/2018), or if symptoms worsen or fail to improve, for Follow up with PCP. with Fabiano Burk M.D.   Future Appointments       Provider Department Center    3/1/2018 JAI Saldaña R.N. [facility]     3/1/2018 3:30 PM Maria Victoria Faith PT [facility]     3/5/2018 JAI Saldaña R.N. [facility]     3/7/2018 2:40 PM Fabiano Burk M.D. [facility] [address]    3/8/2018 9:40 AM ROSALINDA Moya [facility]     3/8/2018 JAI Saldaña R.N. [facility]     3/12/2018 JAI Saldaña R.N. [facility]     3/15/2018 JAI Saldaña R.N. [facility]     3/22/2018 JAI Saldaña R.N. [facility]     3/29/2018 JAI Saldaña R.N.  [facility]     4/5/2018 JAI Saldaña R.N. [facility]     4/12/2018 JAI Saldaña R.N. [facility]     4/16/2018 1:40 PM Michael J Bloch, M.D.; [facility] EXAM 1 [facility] [place] [facility]      4/19/2018 JAI Saldaña R.N. [facility]     4/26/2018 JAI Saldaña R.N. [facility]           Patient Instruction  Patient offered educational material on discharge diagnosis and management of symptoms/red flags. Patient instructed to keep follow-up appointments and to bring written questions and and actual medications to each office visit. Patient instructed to call PCP/specialist with any problems/questions/concerns. Patient verbalizes understanding and has no further questions at this time.    Face-to-face transitional care management services with moderate complexity medical decision making.

## 2018-02-28 NOTE — PROGRESS NOTES
POST DISCHARGE CALL MADE BY Marcela Quintero.  Discharge Date:2/22/2018   Date of Outreach Call: 2/23/2018  1:50 PM  Now that you're home, how are you doing? Fair  Do you have questions about your medications? No    Did you fill your medications? Yes    Do you have a follow-up appointment scheduled?Yes    Discharging Department: Telemetry 8    Number of Attempts: 1  Current or previous attempts completed within two business days of discharge? Yes  Provided education regarding treatment plan, medication, self-management, ADLs? Yes  Has patient completed Advance Directive? If yes, advise them to bring to appointment. No  Care Manager phone number provided? Yes  Is there anything else I can help you with? Yes  Comment:check on  services

## 2018-02-28 NOTE — PATIENT INSTRUCTIONS
If you need further assistance, or have any questions; concerns or lingering symptoms before seeing your Primary Care Provider or specialist.     Do not hesitate to contact us.     Please contact us at the Post-Hospital Follow Up Program at [phone].   Our offices hours are Monday-Friday 8 am-5 pm.    Reminder:   Follow up your liver function in 4-6 weeks. Get the lab order from your primary physician on next visit (3/7)

## 2018-03-01 ENCOUNTER — HOME CARE VISIT (OUTPATIENT)
Dept: HOME HEALTH SERVICES | Facility: HOME HEALTHCARE | Age: 83
End: 2018-03-01
Payer: MEDICARE

## 2018-03-01 VITALS
SYSTOLIC BLOOD PRESSURE: 110 MMHG | RESPIRATION RATE: 18 BRPM | OXYGEN SATURATION: 98 % | DIASTOLIC BLOOD PRESSURE: 58 MMHG | TEMPERATURE: 97.8 F | HEART RATE: 68 BPM

## 2018-03-01 PROCEDURE — 665999 HH PPS REVENUE DEBIT

## 2018-03-01 PROCEDURE — 665998 HH PPS REVENUE CREDIT

## 2018-03-01 PROCEDURE — G0152 HHCP-SERV OF OT,EA 15 MIN: HCPCS

## 2018-03-01 PROCEDURE — G0151 HHCP-SERV OF PT,EA 15 MIN: HCPCS

## 2018-03-01 PROCEDURE — G0299 HHS/HOSPICE OF RN EA 15 MIN: HCPCS

## 2018-03-01 SDOH — ECONOMIC STABILITY: HOUSING INSECURITY
HOME SAFETY: RECOMMENDED SPOUSE HAVE NEIGHBOR GET SHOWER CHAIR FROM HER GARAGE RAFTERS FOR PT TO USE IN TUB SHOWER; BOTH AGREEABLE

## 2018-03-01 ASSESSMENT — ACTIVITIES OF DAILY LIVING (ADL)
ORAL_CARE_ASSISTANCE: 0
LAUNDRY_ASSISTANCE: 6
GROOMING_ASSISTANCE: 0
SHOPPING_ASSISTANCE: 6
TELEPHONE_ASSISTANCE: 1
TOILETING_ASSISTANCE: 0
TRANSPORTATION_ASSISTANCE: 6
EATING_ASSISTANCE: 0
TOILETING_EQUIPMENT_USED: HIGH TOILET
DRESSING_UB_ASSISTANCE: 0
BATHING_ASSISTANCE: 0
HOUSEKEEPING_ASSISTANCE: 6
DRESSING_LB_ASSISTANCE: 0
MEAL_PREP_ASSISTANCE: 6

## 2018-03-02 ENCOUNTER — HOME CARE VISIT (OUTPATIENT)
Dept: HOME HEALTH SERVICES | Facility: HOME HEALTHCARE | Age: 83
End: 2018-03-02
Payer: MEDICARE

## 2018-03-02 VITALS
TEMPERATURE: 99.7 F | DIASTOLIC BLOOD PRESSURE: 50 MMHG | OXYGEN SATURATION: 98 % | SYSTOLIC BLOOD PRESSURE: 112 MMHG | HEART RATE: 55 BPM | RESPIRATION RATE: 18 BRPM

## 2018-03-02 PROCEDURE — 665999 HH PPS REVENUE DEBIT

## 2018-03-02 PROCEDURE — 665998 HH PPS REVENUE CREDIT

## 2018-03-02 SDOH — ECONOMIC STABILITY: HOUSING INSECURITY: UNSAFE COOKING RANGE AREA: 0

## 2018-03-02 SDOH — ECONOMIC STABILITY: HOUSING INSECURITY: UNSAFE APPLIANCES: 0

## 2018-03-02 ASSESSMENT — ACTIVITIES OF DAILY LIVING (ADL): OASIS_M1830: 03

## 2018-03-03 PROCEDURE — 665998 HH PPS REVENUE CREDIT

## 2018-03-03 PROCEDURE — 665999 HH PPS REVENUE DEBIT

## 2018-03-04 PROCEDURE — 665998 HH PPS REVENUE CREDIT

## 2018-03-04 PROCEDURE — 665999 HH PPS REVENUE DEBIT

## 2018-03-05 ENCOUNTER — HOME CARE VISIT (OUTPATIENT)
Dept: HOME HEALTH SERVICES | Facility: HOME HEALTHCARE | Age: 83
End: 2018-03-05
Payer: MEDICARE

## 2018-03-05 VITALS
BODY MASS INDEX: 20.53 KG/M2 | HEART RATE: 68 BPM | TEMPERATURE: 97.8 F | WEIGHT: 139 LBS | RESPIRATION RATE: 18 BRPM | SYSTOLIC BLOOD PRESSURE: 110 MMHG | OXYGEN SATURATION: 98 % | DIASTOLIC BLOOD PRESSURE: 58 MMHG

## 2018-03-05 PROCEDURE — 665999 HH PPS REVENUE DEBIT

## 2018-03-05 PROCEDURE — G0299 HHS/HOSPICE OF RN EA 15 MIN: HCPCS

## 2018-03-05 PROCEDURE — 665998 HH PPS REVENUE CREDIT

## 2018-03-06 VITALS
SYSTOLIC BLOOD PRESSURE: 120 MMHG | BODY MASS INDEX: 19.85 KG/M2 | WEIGHT: 134.4 LBS | TEMPERATURE: 98.7 F | RESPIRATION RATE: 18 BRPM | HEART RATE: 62 BPM | DIASTOLIC BLOOD PRESSURE: 58 MMHG | OXYGEN SATURATION: 97 %

## 2018-03-06 PROCEDURE — 665999 HH PPS REVENUE DEBIT

## 2018-03-06 PROCEDURE — 665998 HH PPS REVENUE CREDIT

## 2018-03-06 ASSESSMENT — ENCOUNTER SYMPTOMS
DIFFICULTY THINKING: 1
RESPIRATORY SYMPTOMS COMMENTS: DENIES SOB, RESP EVEN AND UNLABORED
POOR JUDGMENT: 1
NAUSEA: DENIES
RESPIRATORY SYMPTOMS COMMENTS: DENIES SOB, RESP EVEN AND UNLABORED
DIFFICULTY THINKING: 1

## 2018-03-07 ENCOUNTER — OFFICE VISIT (OUTPATIENT)
Dept: MEDICAL GROUP | Facility: MEDICAL CENTER | Age: 83
End: 2018-03-07
Payer: MEDICARE

## 2018-03-07 VITALS
TEMPERATURE: 98.5 F | WEIGHT: 136.6 LBS | OXYGEN SATURATION: 97 % | HEART RATE: 75 BPM | DIASTOLIC BLOOD PRESSURE: 66 MMHG | SYSTOLIC BLOOD PRESSURE: 134 MMHG | HEIGHT: 69 IN | BODY MASS INDEX: 20.23 KG/M2 | RESPIRATION RATE: 16 BRPM

## 2018-03-07 DIAGNOSIS — I25.10 CORONARY ARTERY DISEASE INVOLVING NATIVE HEART WITHOUT ANGINA PECTORIS, UNSPECIFIED VESSEL OR LESION TYPE: ICD-10-CM

## 2018-03-07 DIAGNOSIS — R63.0 APPETITE LOSS: ICD-10-CM

## 2018-03-07 PROBLEM — I25.2 HISTORY OF NON-ST ELEVATION MYOCARDIAL INFARCTION (NSTEMI): Status: ACTIVE | Noted: 2018-02-21

## 2018-03-07 PROBLEM — R79.89 ELEVATED TROPONIN: Status: RESOLVED | Noted: 2018-02-20 | Resolved: 2018-03-07

## 2018-03-07 PROCEDURE — 665999 HH PPS REVENUE DEBIT

## 2018-03-07 PROCEDURE — 665998 HH PPS REVENUE CREDIT

## 2018-03-07 PROCEDURE — 99214 OFFICE O/P EST MOD 30 MIN: CPT | Performed by: INTERNAL MEDICINE

## 2018-03-07 PROCEDURE — G0180 MD CERTIFICATION HHA PATIENT: HCPCS | Performed by: INTERNAL MEDICINE

## 2018-03-07 RX ORDER — CLOPIDOGREL BISULFATE 75 MG/1
75 TABLET ORAL DAILY
Qty: 90 TAB | Refills: 3 | Status: SHIPPED | OUTPATIENT
Start: 2018-03-07 | End: 2018-04-11 | Stop reason: SDUPTHER

## 2018-03-07 RX ORDER — MEGESTROL ACETATE 20 MG/1
20 TABLET ORAL DAILY
Qty: 30 TAB | Refills: 3 | Status: SHIPPED | OUTPATIENT
Start: 2018-03-07 | End: 2018-04-11 | Stop reason: SDUPTHER

## 2018-03-07 RX ORDER — ATORVASTATIN CALCIUM 10 MG/1
10 TABLET, FILM COATED ORAL
Qty: 90 TAB | Refills: 3 | Status: SHIPPED | OUTPATIENT
Start: 2018-03-07 | End: 2018-03-08 | Stop reason: SDUPTHER

## 2018-03-07 NOTE — PROGRESS NOTES
CC: Follow-up hospitalization heart attack.    HPI:   Donato presents today with the following.    1. Coronary artery disease involving native heart without angina pectoris, unspecified vessel or lesion type  Reviewing the records patient was admitted for chest pain and shortness of breath found to have elevated troponin and non-ST elevation MI. Status post stenting denying any further chest pain or shortness of breath although he does have persistent dyspnea on exertion. He does have follow-up with cardiology tomorrow. He is compliant with blood pressure medication and recent pills.    2. Appetite loss  Weight has been stable over the past 2 weeks he does report that the medication was started on was helpful for his appetite.      Patient Active Problem List    Diagnosis Date Noted   • Near syncope 02/20/2018     Priority: High   • LFTs abnormal 12/06/2017     Priority: Medium   • Coronary artery disease involving native heart without angina pectoris 03/07/2018   • Appetite loss 03/07/2018   • History of non-ST elevation myocardial infarction (NSTEMI) 02/21/2018   • History of gallbladder cancer 11/09/2017   • Benign essential tremor 11/03/2016   • Anemia 02/09/2016   • IGT (impaired glucose tolerance) 11/30/2015   • Benign non-nodular prostatic hyperplasia with lower urinary tract symptoms 08/03/2015   • Dyslipidemia 01/26/2015   • Essential hypertension 01/26/2015       Current Outpatient Prescriptions   Medication Sig Dispense Refill   • clopidogrel (PLAVIX) 75 MG Tab Take 1 Tab by mouth every day. 90 Tab 3   • atorvastatin (LIPITOR) 10 MG Tab Take 1 Tab by mouth every bedtime. 90 Tab 3   • megestrol (MEGACE) 20 MG Tab Take 1 Tab by mouth every day. 30 Tab 3   • aspirin EC 81 MG EC tablet Take 1 Tab by mouth every day. 30 Tab 3   • losartan (COZAAR) 50 MG Tab Take 1 Tab by mouth every day. 30 Tab 3   • metoprolol SR (TOPROL XL) 25 MG TABLET SR 24 HR Take 0.5 Tabs by mouth every day. 30 Tab 3   • Omega-3 Fatty  "Acids (FISH OIL) 1000 MG Cap capsule Take 2 Caps by mouth every day. 60 Cap 3   • potassium chloride SA (KDUR) 20 MEQ Tab CR Take 1 Tab by mouth every day. 30 Tab 0   • fluticasone (FLONASE) 50 MCG/ACT nasal spray Spray 1 Spray in nose every day. 3 Bottle 3   • terazosin (HYTRIN) 5 MG Cap TAKE 1 CAPSULE DAILY 90 Cap 3   • MAGNESIUM PO Take 250 mg by mouth 1 time daily as needed (supplement).     • Vitamin D, Cholecalciferol, 1000 UNITS Cap Take 2,000 Units by mouth every day at 6 PM.     • loratadine (CLARITIN) 10 MG Tab Take 10 mg by mouth every day.       No current facility-administered medications for this visit.          Allergies as of 03/07/2018   • (No Known Allergies)        ROS: Denies Chest pain, SOB, LE edema.    /66   Pulse 75   Temp 36.9 °C (98.5 °F)   Resp 16   Ht 1.753 m (5' 9\")   Wt 62 kg (136 lb 9.6 oz)   SpO2 97%   BMI 20.17 kg/m²      Physical Exam:  Gen:         Alert and oriented, No apparent distress.  Neck:        No Lymphadenopathy or Bruits.  Lungs:     Clear to auscultation bilaterally  CV:          Regular rate and rhythm. No murmurs, rubs or gallops.               Ext:          No clubbing, cyanosis, edema.      Assessment and Plan.   88 y.o. male with the following issues.    1. Coronary artery disease involving native heart without angina pectoris, unspecified vessel or lesion type  Concerns about his state of well-being and driving have written for a 's evaluation. Continue current medications we'll follow along with cardiology.  - REFERRAL TO OCCUPATIONAL THERAPY Reason for Therapy: Eval/Treat/Report    2. Appetite loss  Refilled appetite stimulant.      "

## 2018-03-08 ENCOUNTER — HOME CARE VISIT (OUTPATIENT)
Dept: HOME HEALTH SERVICES | Facility: HOME HEALTHCARE | Age: 83
End: 2018-03-08
Payer: MEDICARE

## 2018-03-08 ENCOUNTER — OFFICE VISIT (OUTPATIENT)
Dept: CARDIOLOGY | Facility: MEDICAL CENTER | Age: 83
End: 2018-03-08
Payer: MEDICARE

## 2018-03-08 VITALS
HEART RATE: 80 BPM | BODY MASS INDEX: 20.46 KG/M2 | HEIGHT: 68 IN | OXYGEN SATURATION: 96 % | WEIGHT: 135 LBS | SYSTOLIC BLOOD PRESSURE: 98 MMHG | DIASTOLIC BLOOD PRESSURE: 52 MMHG

## 2018-03-08 DIAGNOSIS — E78.5 DYSLIPIDEMIA: ICD-10-CM

## 2018-03-08 DIAGNOSIS — R79.89 LFTS ABNORMAL: ICD-10-CM

## 2018-03-08 DIAGNOSIS — I10 ESSENTIAL HYPERTENSION: ICD-10-CM

## 2018-03-08 DIAGNOSIS — I25.10 CORONARY ARTERY DISEASE INVOLVING NATIVE CORONARY ARTERY OF NATIVE HEART WITHOUT ANGINA PECTORIS: Primary | ICD-10-CM

## 2018-03-08 PROCEDURE — 99214 OFFICE O/P EST MOD 30 MIN: CPT | Performed by: NURSE PRACTITIONER

## 2018-03-08 PROCEDURE — 665999 HH PPS REVENUE DEBIT

## 2018-03-08 PROCEDURE — G0299 HHS/HOSPICE OF RN EA 15 MIN: HCPCS

## 2018-03-08 PROCEDURE — 665998 HH PPS REVENUE CREDIT

## 2018-03-08 RX ORDER — METOPROLOL SUCCINATE 25 MG/1
12.5 TABLET, EXTENDED RELEASE ORAL DAILY
Qty: 90 TAB | Refills: 3 | Status: SHIPPED | OUTPATIENT
Start: 2018-03-08 | End: 2018-04-11 | Stop reason: SDUPTHER

## 2018-03-08 RX ORDER — LOSARTAN POTASSIUM 25 MG/1
25 TABLET ORAL DAILY
Qty: 90 TAB | Refills: 3 | Status: SHIPPED | OUTPATIENT
Start: 2018-03-08 | End: 2018-04-11 | Stop reason: SDUPTHER

## 2018-03-08 RX ORDER — ATORVASTATIN CALCIUM 10 MG/1
10 TABLET, FILM COATED ORAL EVERY EVENING
Qty: 90 TAB | Refills: 3 | Status: SHIPPED | OUTPATIENT
Start: 2018-03-08 | End: 2018-04-11 | Stop reason: SDUPTHER

## 2018-03-08 RX ORDER — POTASSIUM CHLORIDE 20 MEQ/1
20 TABLET, EXTENDED RELEASE ORAL DAILY
Qty: 90 TAB | Refills: 3 | Status: SHIPPED | OUTPATIENT
Start: 2018-03-08 | End: 2018-04-11 | Stop reason: SDUPTHER

## 2018-03-08 ASSESSMENT — ENCOUNTER SYMPTOMS
ABDOMINAL PAIN: 0
DIZZINESS: 1
MYALGIAS: 0
CLAUDICATION: 0
WEAKNESS: 0
PND: 0
PALPITATIONS: 0
SHORTNESS OF BREATH: 0
COUGH: 0
ORTHOPNEA: 0

## 2018-03-09 VITALS
RESPIRATION RATE: 18 BRPM | DIASTOLIC BLOOD PRESSURE: 52 MMHG | HEART RATE: 68 BPM | TEMPERATURE: 98.3 F | SYSTOLIC BLOOD PRESSURE: 108 MMHG | OXYGEN SATURATION: 98 %

## 2018-03-09 PROCEDURE — 665998 HH PPS REVENUE CREDIT

## 2018-03-09 PROCEDURE — 665999 HH PPS REVENUE DEBIT

## 2018-03-09 ASSESSMENT — ENCOUNTER SYMPTOMS: NAUSEA: DENIES

## 2018-03-10 PROCEDURE — 665999 HH PPS REVENUE DEBIT

## 2018-03-10 PROCEDURE — 665998 HH PPS REVENUE CREDIT

## 2018-03-11 PROCEDURE — 665999 HH PPS REVENUE DEBIT

## 2018-03-11 PROCEDURE — 665998 HH PPS REVENUE CREDIT

## 2018-03-12 ENCOUNTER — HOME CARE VISIT (OUTPATIENT)
Dept: HOME HEALTH SERVICES | Facility: HOME HEALTHCARE | Age: 83
End: 2018-03-12
Payer: MEDICARE

## 2018-03-12 VITALS
TEMPERATURE: 97.8 F | RESPIRATION RATE: 16 BRPM | SYSTOLIC BLOOD PRESSURE: 108 MMHG | DIASTOLIC BLOOD PRESSURE: 60 MMHG | OXYGEN SATURATION: 97 % | HEART RATE: 68 BPM

## 2018-03-12 PROCEDURE — 665999 HH PPS REVENUE DEBIT

## 2018-03-12 PROCEDURE — 665998 HH PPS REVENUE CREDIT

## 2018-03-12 PROCEDURE — G0496 LPN CARE TRAIN/EDU IN HH: HCPCS

## 2018-03-13 PROCEDURE — 665999 HH PPS REVENUE DEBIT

## 2018-03-13 PROCEDURE — 665998 HH PPS REVENUE CREDIT

## 2018-03-14 PROCEDURE — 665999 HH PPS REVENUE DEBIT

## 2018-03-14 PROCEDURE — 665998 HH PPS REVENUE CREDIT

## 2018-03-15 ENCOUNTER — HOME CARE VISIT (OUTPATIENT)
Dept: HOME HEALTH SERVICES | Facility: HOME HEALTHCARE | Age: 83
End: 2018-03-15
Payer: MEDICARE

## 2018-03-15 PROCEDURE — G0299 HHS/HOSPICE OF RN EA 15 MIN: HCPCS

## 2018-03-15 PROCEDURE — 665998 HH PPS REVENUE CREDIT

## 2018-03-15 PROCEDURE — 665999 HH PPS REVENUE DEBIT

## 2018-03-16 PROCEDURE — 665998 HH PPS REVENUE CREDIT

## 2018-03-16 PROCEDURE — 665999 HH PPS REVENUE DEBIT

## 2018-03-17 PROCEDURE — 665999 HH PPS REVENUE DEBIT

## 2018-03-17 PROCEDURE — 665998 HH PPS REVENUE CREDIT

## 2018-03-18 PROCEDURE — 665998 HH PPS REVENUE CREDIT

## 2018-03-18 PROCEDURE — 665999 HH PPS REVENUE DEBIT

## 2018-03-19 VITALS
SYSTOLIC BLOOD PRESSURE: 120 MMHG | BODY MASS INDEX: 20.98 KG/M2 | WEIGHT: 138 LBS | RESPIRATION RATE: 16 BRPM | HEART RATE: 68 BPM | DIASTOLIC BLOOD PRESSURE: 52 MMHG | OXYGEN SATURATION: 98 % | TEMPERATURE: 98.8 F

## 2018-03-19 PROCEDURE — 665999 HH PPS REVENUE DEBIT

## 2018-03-19 PROCEDURE — 665998 HH PPS REVENUE CREDIT

## 2018-03-19 ASSESSMENT — ENCOUNTER SYMPTOMS
NAUSEA: DENIES
RESPIRATORY SYMPTOMS COMMENTS: DENIES SOB, RESP EVEN AND UNLABORED

## 2018-03-20 PROCEDURE — 665999 HH PPS REVENUE DEBIT

## 2018-03-20 PROCEDURE — 665998 HH PPS REVENUE CREDIT

## 2018-03-21 PROCEDURE — 665998 HH PPS REVENUE CREDIT

## 2018-03-21 PROCEDURE — 665999 HH PPS REVENUE DEBIT

## 2018-03-22 ENCOUNTER — HOME CARE VISIT (OUTPATIENT)
Dept: HOME HEALTH SERVICES | Facility: HOME HEALTHCARE | Age: 83
End: 2018-03-22
Payer: MEDICARE

## 2018-03-22 PROCEDURE — G0162 HHC RN E&M PLAN SVS, 15 MIN: HCPCS

## 2018-03-22 PROCEDURE — 665999 HH PPS REVENUE DEBIT

## 2018-03-22 PROCEDURE — 665998 HH PPS REVENUE CREDIT

## 2018-03-23 ENCOUNTER — HOME CARE VISIT (OUTPATIENT)
Dept: HOME HEALTH SERVICES | Facility: HOME HEALTHCARE | Age: 83
End: 2018-03-23
Payer: MEDICARE

## 2018-03-23 VITALS
BODY MASS INDEX: 207.09 KG/M2 | RESPIRATION RATE: 16 BRPM | WEIGHT: 315 LBS | TEMPERATURE: 98.7 F | DIASTOLIC BLOOD PRESSURE: 58 MMHG | SYSTOLIC BLOOD PRESSURE: 132 MMHG | HEART RATE: 68 BPM | OXYGEN SATURATION: 98 %

## 2018-03-23 PROCEDURE — 665998 HH PPS REVENUE CREDIT

## 2018-03-23 PROCEDURE — 665999 HH PPS REVENUE DEBIT

## 2018-03-23 SDOH — ECONOMIC STABILITY: HOUSING INSECURITY: UNSAFE COOKING RANGE AREA: 0

## 2018-03-23 SDOH — ECONOMIC STABILITY: HOUSING INSECURITY: HOME SAFETY: DOES NOT HAVE SUPP O2

## 2018-03-23 SDOH — ECONOMIC STABILITY: HOUSING INSECURITY: UNSAFE APPLIANCES: 0

## 2018-03-23 ASSESSMENT — ACTIVITIES OF DAILY LIVING (ADL)
HOME_HEALTH_OASIS: 00
HOME_HEALTH_OASIS: 01
OASIS_M1830: 01

## 2018-03-24 PROCEDURE — 665998 HH PPS REVENUE CREDIT

## 2018-03-24 PROCEDURE — 665999 HH PPS REVENUE DEBIT

## 2018-03-25 PROCEDURE — 665998 HH PPS REVENUE CREDIT

## 2018-03-25 PROCEDURE — 665999 HH PPS REVENUE DEBIT

## 2018-03-26 PROCEDURE — 665999 HH PPS REVENUE DEBIT

## 2018-03-26 PROCEDURE — 665998 HH PPS REVENUE CREDIT

## 2018-03-27 PROCEDURE — 665998 HH PPS REVENUE CREDIT

## 2018-03-27 PROCEDURE — 665999 HH PPS REVENUE DEBIT

## 2018-03-28 PROCEDURE — 665999 HH PPS REVENUE DEBIT

## 2018-03-28 PROCEDURE — 665998 HH PPS REVENUE CREDIT

## 2018-03-30 ENCOUNTER — PATIENT OUTREACH (OUTPATIENT)
Dept: HEALTH INFORMATION MANAGEMENT | Facility: OTHER | Age: 83
End: 2018-03-30

## 2018-04-11 ENCOUNTER — OFFICE VISIT (OUTPATIENT)
Dept: MEDICAL GROUP | Facility: MEDICAL CENTER | Age: 83
End: 2018-04-11
Payer: MEDICARE

## 2018-04-11 VITALS
DIASTOLIC BLOOD PRESSURE: 58 MMHG | WEIGHT: 130 LBS | RESPIRATION RATE: 16 BRPM | OXYGEN SATURATION: 99 % | BODY MASS INDEX: 19.7 KG/M2 | TEMPERATURE: 99.2 F | HEIGHT: 68 IN | SYSTOLIC BLOOD PRESSURE: 112 MMHG | HEART RATE: 81 BPM

## 2018-04-11 DIAGNOSIS — R73.02 IGT (IMPAIRED GLUCOSE TOLERANCE): ICD-10-CM

## 2018-04-11 DIAGNOSIS — N40.0 HYPERTROPHY OF PROSTATE WITHOUT URINARY OBSTRUCTION: ICD-10-CM

## 2018-04-11 DIAGNOSIS — I10 ESSENTIAL HYPERTENSION: ICD-10-CM

## 2018-04-11 DIAGNOSIS — R55 NEAR SYNCOPE: ICD-10-CM

## 2018-04-11 DIAGNOSIS — E78.5 DYSLIPIDEMIA: ICD-10-CM

## 2018-04-11 PROCEDURE — 99213 OFFICE O/P EST LOW 20 MIN: CPT | Performed by: INTERNAL MEDICINE

## 2018-04-11 RX ORDER — ATORVASTATIN CALCIUM 10 MG/1
10 TABLET, FILM COATED ORAL EVERY EVENING
Qty: 90 TAB | Refills: 3 | Status: SHIPPED | OUTPATIENT
Start: 2018-04-11 | End: 2019-07-05 | Stop reason: SDUPTHER

## 2018-04-11 RX ORDER — TERAZOSIN 5 MG/1
5 CAPSULE ORAL DAILY
Qty: 90 CAP | Refills: 3 | Status: SHIPPED | OUTPATIENT
Start: 2018-04-11 | End: 2019-06-07 | Stop reason: SDUPTHER

## 2018-04-11 RX ORDER — POTASSIUM CHLORIDE 20 MEQ/1
20 TABLET, EXTENDED RELEASE ORAL DAILY
Qty: 90 TAB | Refills: 3 | Status: SHIPPED | OUTPATIENT
Start: 2018-04-11 | End: 2019-06-13

## 2018-04-11 RX ORDER — MEGESTROL ACETATE 20 MG/1
20 TABLET ORAL DAILY
Qty: 90 TAB | Refills: 3 | Status: SHIPPED | OUTPATIENT
Start: 2018-04-11 | End: 2019-03-30 | Stop reason: SDUPTHER

## 2018-04-11 RX ORDER — METOPROLOL SUCCINATE 25 MG/1
12.5 TABLET, EXTENDED RELEASE ORAL DAILY
Qty: 90 TAB | Refills: 3 | Status: SHIPPED | OUTPATIENT
Start: 2018-04-11 | End: 2018-06-11 | Stop reason: SDUPTHER

## 2018-04-11 RX ORDER — CLOPIDOGREL BISULFATE 75 MG/1
75 TABLET ORAL DAILY
Qty: 90 TAB | Refills: 3 | Status: ON HOLD | OUTPATIENT
Start: 2018-04-11 | End: 2018-08-04

## 2018-04-11 RX ORDER — LOSARTAN POTASSIUM 25 MG/1
25 TABLET ORAL DAILY
Qty: 90 TAB | Refills: 3 | Status: ON HOLD | OUTPATIENT
Start: 2018-04-11 | End: 2018-04-21

## 2018-04-12 NOTE — PROGRESS NOTES
CC: Follow-up hypertension    HPI:   Donato presents today with the following.    1. Essential hypertension  Presents with wife stating that he is quite upset that he can no longer drive and he is interested in getting a referral to be evaluated. Weight is down slightly is not eaten regularly. He denies any chest pain or short of breath no other complaints today.            Patient Active Problem List    Diagnosis Date Noted   • Near syncope 02/20/2018     Priority: High   • LFTs abnormal 12/06/2017     Priority: Medium   • Coronary artery disease involving native heart without angina pectoris 03/07/2018   • Appetite loss 03/07/2018   • History of non-ST elevation myocardial infarction (NSTEMI) 02/21/2018   • History of gallbladder cancer 11/09/2017   • Benign essential tremor 11/03/2016   • Anemia 02/09/2016   • IGT (impaired glucose tolerance) 11/30/2015   • Benign non-nodular prostatic hyperplasia with lower urinary tract symptoms 08/03/2015   • Dyslipidemia 01/26/2015   • Essential hypertension 01/26/2015       Current Outpatient Prescriptions   Medication Sig Dispense Refill   • losartan (COZAAR) 25 MG Tab Take 1 Tab by mouth every day. 90 Tab 3   • atorvastatin (LIPITOR) 10 MG Tab Take 1 Tab by mouth every evening. 90 Tab 3   • clopidogrel (PLAVIX) 75 MG Tab Take 1 Tab by mouth every day. 90 Tab 3   • metoprolol SR (TOPROL XL) 25 MG TABLET SR 24 HR Take 0.5 Tabs by mouth every day. 90 Tab 3   • potassium chloride SA (KDUR) 20 MEQ Tab CR Take 1 Tab by mouth every day. 90 Tab 3   • megestrol (MEGACE) 20 MG Tab Take 1 Tab by mouth every day. 90 Tab 3   • terazosin (HYTRIN) 5 MG Cap Take 1 Cap by mouth every day. 90 Cap 3   • aspirin EC 81 MG EC tablet Take 1 Tab by mouth every day. 30 Tab 3   • Omega-3 Fatty Acids (FISH OIL) 1000 MG Cap capsule Take 2 Caps by mouth every day. 60 Cap 3   • Vitamin D, Cholecalciferol, 1000 UNITS Cap Take 2,000 Units by mouth every day at 6 PM.     • fluticasone (FLONASE) 50 MCG/ACT  "nasal spray Spray 1 Spray in nose every day. (Patient not taking: Reported on 4/11/2018) 3 Bottle 3   • MAGNESIUM PO Take 250 mg by mouth 1 time daily as needed (supplement).     • loratadine (CLARITIN) 10 MG Tab Take 10 mg by mouth every day.       No current facility-administered medications for this visit.          Allergies as of 04/11/2018   • (No Known Allergies)        ROS: Denies Chest pain, SOB, LE edema.    /58   Pulse 81   Temp 37.3 °C (99.2 °F)   Resp 16   Ht 1.727 m (5' 8\")   Wt 59 kg (130 lb)   SpO2 99%   BMI 19.77 kg/m²      Physical Exam:  Gen:         Alert and oriented, No apparent distress.  Neck:        No Lymphadenopathy or Bruits.  Lungs:     Clear to auscultation bilaterally  CV:          Regular rate and rhythm. No murmurs, rubs or gallops.               Ext:          No clubbing, cyanosis, edema.      Assessment and Plan.   88 y.o. male with the following issues.    1. Essential hypertension  Currently well controlled, Discuss diet, exercise and salt restriction.  No change to medication therapy.       "

## 2018-04-16 ENCOUNTER — OFFICE VISIT (OUTPATIENT)
Dept: VASCULAR LAB | Facility: MEDICAL CENTER | Age: 83
DRG: 871 | End: 2018-04-16
Attending: INTERNAL MEDICINE
Payer: MEDICARE

## 2018-04-16 ENCOUNTER — HOSPITAL ENCOUNTER (INPATIENT)
Facility: MEDICAL CENTER | Age: 83
LOS: 4 days | DRG: 871 | End: 2018-04-21
Attending: EMERGENCY MEDICINE | Admitting: INTERNAL MEDICINE
Payer: MEDICARE

## 2018-04-16 VITALS
WEIGHT: 135 LBS | HEIGHT: 68 IN | SYSTOLIC BLOOD PRESSURE: 119 MMHG | DIASTOLIC BLOOD PRESSURE: 40 MMHG | BODY MASS INDEX: 20.46 KG/M2 | HEART RATE: 81 BPM

## 2018-04-16 DIAGNOSIS — I25.10 CORONARY ARTERY DISEASE INVOLVING NATIVE CORONARY ARTERY OF NATIVE HEART WITHOUT ANGINA PECTORIS: ICD-10-CM

## 2018-04-16 DIAGNOSIS — E78.5 DYSLIPIDEMIA: ICD-10-CM

## 2018-04-16 DIAGNOSIS — A41.9 SEPSIS, DUE TO UNSPECIFIED ORGANISM: ICD-10-CM

## 2018-04-16 DIAGNOSIS — J18.9 PNEUMONIA OF LEFT LOWER LOBE DUE TO INFECTIOUS ORGANISM: ICD-10-CM

## 2018-04-16 DIAGNOSIS — E80.6 HYPERBILIRUBINEMIA: ICD-10-CM

## 2018-04-16 DIAGNOSIS — I10 ESSENTIAL HYPERTENSION: ICD-10-CM

## 2018-04-16 DIAGNOSIS — R50.9 FEVER, UNSPECIFIED FEVER CAUSE: ICD-10-CM

## 2018-04-16 DIAGNOSIS — K83.09 CHOLANGITIS: ICD-10-CM

## 2018-04-16 PROCEDURE — 99285 EMERGENCY DEPT VISIT HI MDM: CPT

## 2018-04-16 PROCEDURE — 99204 OFFICE O/P NEW MOD 45 MIN: CPT | Performed by: INTERNAL MEDICINE

## 2018-04-16 PROCEDURE — 80053 COMPREHEN METABOLIC PANEL: CPT

## 2018-04-16 PROCEDURE — 83605 ASSAY OF LACTIC ACID: CPT

## 2018-04-16 PROCEDURE — 85025 COMPLETE CBC W/AUTO DIFF WBC: CPT

## 2018-04-16 PROCEDURE — 99212 OFFICE O/P EST SF 10 MIN: CPT

## 2018-04-16 ASSESSMENT — ENCOUNTER SYMPTOMS
CLAUDICATION: 0
FOCAL WEAKNESS: 0
SPEECH CHANGE: 0
NECK PAIN: 1
SENSORY CHANGE: 0
DEPRESSION: 0
NERVOUS/ANXIOUS: 0
WEIGHT LOSS: 1
SHORTNESS OF BREATH: 0
FALLS: 0
DIZZINESS: 0
BLURRED VISION: 1
DOUBLE VISION: 0
LOSS OF CONSCIOUSNESS: 0
BLOOD IN STOOL: 0
BACK PAIN: 1
COUGH: 0
MYALGIAS: 0
HEADACHES: 0

## 2018-04-16 ASSESSMENT — PAIN SCALES - GENERAL: PAINLEVEL_OUTOF10: 0

## 2018-04-16 NOTE — PROGRESS NOTES
"  INITIAL VASCULAR VISIT  Subjective:   Donato Burciaga is a 88 y.o. 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   In Feb presented with weakness underwent PTCA without stent placement  No further chest pain.  No further episodes.  Has been on terazosin for quite awhile - sounds like for urinary/prostate issues.   No recent lightheadedness or falls.  Steady on feet but uses a cane  BP at home in 130s/70s  Has been on atorvastatin for quite awhile   No myalgias  On ASA + clopidogrel.  - no bleeding.    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   • ERCP W/REM FB AND/OR STENT CHG N/A 12/5/2016    Procedure: ERCP W/REM FB AND/OR STENT CHG - PYLORIC STENT REMOVAL SWAP METAL STENT PLACEMENT;  Surgeon: Sunny Grove M.D.;  Location: [facility];  Service:    • GASTROSCOPY-ENDO N/A 7/11/2016    Procedure: GASTROSCOPY-ENDO;  Surgeon: Sunny Grove M.D.;  Location: [facility];  Service:    • EGD W/ENDOSCOPIC ULTRASOUND N/A 7/11/2016    Procedure: EGD W/ENDOSCOPIC ULTRASOUND - UPPER LINEAR;  Surgeon: Sunny Grove M.D.;  Location: [facility];  Service:    • ERCP W/REM FB AND/OR STENT CHG N/A 7/11/2016    Procedure: ERCP W/REM FB AND/OR STENT CHG - STENT EXCHANGE;  Surgeon: Sunny Grove M.D.;  Location: [facility];  " Service:    • DIAMANTE BY LAPAROSCOPY  2/10/2016    Procedure: DIAMANTE BY LAPAROSCOPY;  Surgeon: Elver Turcios M.D.;  Location: SURGERY [facility];  Service:    • ERCP IN OR N/A 2/8/2016    Procedure: ERCP IN OR;  Surgeon: Sunny Grove M.D.;  Location: SURGERY [facility];  Service:      Family History   Problem Relation Age of Onset   • No Known Problems Mother      History   Smoking Status   • Former Smoker   • Years: 0.00   • Types: Cigarettes   • Quit date: 1/1/1960   Smokeless Tobacco   • Never Used     Social History   Substance Use Topics   • Smoking status: Former Smoker     Years: 0.00     Types: Cigarettes     Quit date: 1/1/1960   • Smokeless tobacco: Never Used   • Alcohol use 1.8 oz/week     3 Glasses of wine per week      Comment: 1 per day     Outpatient Encounter Prescriptions as of 4/16/2018   Medication Sig Dispense Refill   • losartan (COZAAR) 25 MG Tab Take 1 Tab by mouth every day. 90 Tab 3   • atorvastatin (LIPITOR) 10 MG Tab Take 1 Tab by mouth every evening. 90 Tab 3   • clopidogrel (PLAVIX) 75 MG Tab Take 1 Tab by mouth every day. 90 Tab 3   • metoprolol SR (TOPROL XL) 25 MG TABLET SR 24 HR Take 0.5 Tabs by mouth every day. 90 Tab 3   • potassium chloride SA (KDUR) 20 MEQ Tab CR Take 1 Tab by mouth every day. 90 Tab 3   • megestrol (MEGACE) 20 MG Tab Take 1 Tab by mouth every day. 90 Tab 3   • terazosin (HYTRIN) 5 MG Cap Take 1 Cap by mouth every day. 90 Cap 3   • aspirin EC 81 MG EC tablet Take 1 Tab by mouth every day. 30 Tab 3   • Omega-3 Fatty Acids (FISH OIL) 1000 MG Cap capsule Take 2 Caps by mouth every day. 60 Cap 3   • MAGNESIUM PO Take 250 mg by mouth 1 time daily as needed (supplement).     • Vitamin D, Cholecalciferol, 1000 UNITS Cap Take 2,000 Units by mouth every day at 6 PM.     • loratadine (CLARITIN) 10 MG Tab Take 10 mg by mouth every day.     • [DISCONTINUED] fluticasone (FLONASE) 50 MCG/ACT nasal spray Spray 1 Spray in nose every day. (Patient not taking: Reported on  "4/16/2018) 3 Bottle 3     No facility-administered encounter medications on file as of 4/16/2018.      No Known Allergies     DIET AND EXERCISE:  Weight Change: stable  Diet: common adult  Exercise: minimal exercise     Review of Systems   Constitutional: Positive for weight loss.   HENT: Negative for hearing loss.    Eyes: Positive for blurred vision. Negative for double vision.   Respiratory: Negative for cough and shortness of breath.    Cardiovascular: Negative for chest pain, claudication and leg swelling.   Gastrointestinal: Negative for blood in stool and melena.   Genitourinary: Negative for hematuria.   Musculoskeletal: Positive for back pain, joint pain and neck pain. Negative for falls and myalgias.   Skin: Negative for rash.   Neurological: Negative for dizziness, sensory change, speech change, focal weakness, loss of consciousness and headaches.   Psychiatric/Behavioral: Negative for depression. The patient is not nervous/anxious.       Objective:     Vitals:    04/16/18 1329   BP: 119/40   Pulse: 81   Weight: 61.2 kg (135 lb)   Height: 1.727 m (5' 8\")      Body mass index is 20.53 kg/m².  Physical Exam   Constitutional: He is oriented to person, place, and time. No distress.   thin   HENT:   Head: Normocephalic and atraumatic.   Eyes: Pupils are equal, round, and reactive to light.   Neck: No thyromegaly present.   Cardiovascular: Normal rate, regular rhythm, normal heart sounds and intact distal pulses.    No murmur heard.  Pulmonary/Chest: Effort normal and breath sounds normal. No respiratory distress. He has no wheezes. He has no rales.   Abdominal: Soft. He exhibits no distension.   Musculoskeletal: He exhibits no edema.   Neurological: He is alert and oriented to person, place, and time. No cranial nerve deficit. Coordination normal.   Walks with cane - appears steady   Skin: He is not diaphoretic.   Psychiatric: He has a normal mood and affect. His behavior is normal.     Lab Results   Component " Value Date    CHOLSTRLTOT 147 11/01/2017    LDL 76 11/01/2017    HDL 58 11/01/2017    TRIGLYCERIDE 66 11/01/2017      Lab Results   Component Value Date    PROTHROMBTM 15.3 (H) 02/20/2018    INR 1.24 (H) 02/20/2018       Lab Results   Component Value Date    HBA1C 5.5 11/01/2017      Lab Results   Component Value Date    SODIUM 140 02/22/2018    POTASSIUM 3.5 (L) 02/22/2018    CHLORIDE 112 02/22/2018    CO2 22 02/22/2018    GLUCOSE 87 02/22/2018    BUN 16 02/22/2018    CREATININE 0.64 02/22/2018    BUNCREATRAT 25 (H) 11/01/2017    IFAFR[place] >60 02/22/2018    IFNOTAFR >60 02/22/2018        Lab Results   Component Value Date    WBC 6.1 02/22/2018    RBC 3.57 (L) 02/22/2018    HEMOGLOBIN 10.6 (L) 02/22/2018    HEMATOCRIT 32.4 (L) 02/22/2018    MCV 90.8 02/22/2018    MCH 29.7 02/22/2018    MCHC 32.7 (L) 02/22/2018    MPV 10.0 02/22/2018      Abdominal u/s Fec 2018:  1.  The liver is enlarged and heterogenously echogenic.  Differential diagnosis includes most likely hepatic steatosis versus other diffuse hepatocellular disease.  2.  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  3.  Dilated pancreatic duct.  4.  Abdominal aortic aneurysm is again seen. 3.2x3.1 cmn    Echo Feb 2018:  No prior study is available for comparison.   Normal left ventricular chamber size.  Left ventricular ejection fraction is visually estimated to be 65%.  Apical anteroseptal hypokinesis.  Grade II diastolic dysfunction.  Aortic sclerosis without stenosis.  Mild aortic insufficiency.  Right heart pressures are normal.     Medical Decision Making:  Today's Assessment / Status / Plan:     1. Coronary artery disease involving native coronary artery of native heart without angina pectoris  CBC WITHOUT DIFFERENTIAL   2. Dyslipidemia  COMP METABOLIC PANEL    LIPID PROFILE    TSH   3. Essential hypertension  COMP METABOLIC PANEL     Patient Type: Secondary Prevention    Etiology of Established CVD if Present:   1) CAD  s/p PCI in feb 2018  2) AAA, small and without previous intervention    Lipid Management: Qualifies for Statin Therapy Based on 2013 ACC/AHA Guidelines: yes  Calculated 10-Year Risk of ASCVD: N/A  Currently on Statin: Yes  Patient with indication for at least mod intensity statin, which he is on  NLA goal LDL-C <70 and nonHDL <100  Plan:  - continue atrovastatin 10 mg daily  - recheck fasting lipids prior to cards appt   - consider increase dose if not at goal    Blood Pressure Management:Goal: JNC8 (2013) Office BP Goal:<150/90; Under Control: yes  Would avoid over-vigorous treatment of BP given previous h/o lightheadedness - denies symptoms currently  BP appears under reasonable control both in and out of office for current scenario  On beta blocker and ARB s/p cor intervention  Terazosin sounds like as much for bph as for BP - is associated with risk of lightheadedness  Plan:  - continue metoprolol  - continue losartan  - continue terazosin at current dose for now, but consider dose decrease or d/c if has further lightheadedness or falls  - continue home BP monitoring    Glycemic Status: Normal    Anti-Platelet/Anti-Coagulant Tx: yes  Currently on DAP with clopidogrel and asa s/p cor intervention  - hopefully can drop one agent in future, but will defer that decision to cards    Smoking: continue complete avoidance      Physical Activity: daily walking - not good canddiate for ICR given lack of transportation    Weight Management and Nutrition: Dietary plan was discussed with patient at this visit including maintaining weight per diet discussed with pcp    Other:     1) CAD s/p PCI in feb 2018.  Normal EF on recent echo.  Continue medical management as above.  F/u with cards    2) AAA, small and without previous intervention, asymptomatic - very small with low risk of grown and rupture  - continue medical management as above  - repeat u/s in one year   - if stable, probably would not recheck for appox 3 years  after    3) h/o biliary malignancy - defer management to pcp and GI    Instructed to follow-up with PCP for remainder of adult medical needs: yes  We will partner with other providers in the management of established vascular disease and cardiometabolic risk factors.    Studies to Be Obtained: aortoiliac duplex in feb 2019  Labs to Be Obtained: cmp, cbc, lipid panel, TSH prior to cards follow up visit in june    Follow up in: 10 months after AAA imaging; if stable at that time would defer all further follow up to [facility] and see PRN    Michael J Bloch, M.D.    Cc:    Fabiano Rodas MD

## 2018-04-17 ENCOUNTER — APPOINTMENT (OUTPATIENT)
Dept: RADIOLOGY | Facility: MEDICAL CENTER | Age: 83
DRG: 871 | End: 2018-04-17
Attending: EMERGENCY MEDICINE
Payer: MEDICARE

## 2018-04-17 ENCOUNTER — APPOINTMENT (OUTPATIENT)
Dept: RADIOLOGY | Facility: MEDICAL CENTER | Age: 83
DRG: 871 | End: 2018-04-17
Attending: INTERNAL MEDICINE
Payer: MEDICARE

## 2018-04-17 ENCOUNTER — RESOLUTE PROFESSIONAL BILLING HOSPITAL PROF FEE (OUTPATIENT)
Dept: HOSPITALIST | Facility: MEDICAL CENTER | Age: 83
End: 2018-04-17
Payer: MEDICARE

## 2018-04-17 PROBLEM — R53.1 WEAKNESS: Status: ACTIVE | Noted: 2018-04-17

## 2018-04-17 PROBLEM — R74.01 TRANSAMINITIS: Status: ACTIVE | Noted: 2018-04-17

## 2018-04-17 LAB
ALBUMIN SERPL BCP-MCNC: 2.9 G/DL (ref 3.2–4.9)
ALBUMIN SERPL BCP-MCNC: 3.1 G/DL (ref 3.2–4.9)
ALBUMIN/GLOB SERPL: 1.2 G/DL
ALBUMIN/GLOB SERPL: 1.3 G/DL
ALP SERPL-CCNC: 358 U/L (ref 30–99)
ALP SERPL-CCNC: 403 U/L (ref 30–99)
ALT SERPL-CCNC: 148 U/L (ref 2–50)
ALT SERPL-CCNC: 186 U/L (ref 2–50)
ANION GAP SERPL CALC-SCNC: 10 MMOL/L (ref 0–11.9)
ANION GAP SERPL CALC-SCNC: 7 MMOL/L (ref 0–11.9)
APPEARANCE UR: CLEAR
APTT PPP: 28 SEC (ref 24.7–36)
AST SERPL-CCNC: 130 U/L (ref 12–45)
AST SERPL-CCNC: 89 U/L (ref 12–45)
BACTERIA #/AREA URNS HPF: NEGATIVE /HPF
BASOPHILS # BLD AUTO: 0.3 % (ref 0–1.8)
BASOPHILS # BLD AUTO: 0.3 % (ref 0–1.8)
BASOPHILS # BLD: 0.04 K/UL (ref 0–0.12)
BASOPHILS # BLD: 0.04 K/UL (ref 0–0.12)
BILIRUB SERPL-MCNC: 2.7 MG/DL (ref 0.1–1.5)
BILIRUB SERPL-MCNC: 3.1 MG/DL (ref 0.1–1.5)
BILIRUB UR QL STRIP.AUTO: ABNORMAL
BUN SERPL-MCNC: 27 MG/DL (ref 8–22)
BUN SERPL-MCNC: 38 MG/DL (ref 8–22)
CALCIUM SERPL-MCNC: 8 MG/DL (ref 8.5–10.5)
CALCIUM SERPL-MCNC: 8.3 MG/DL (ref 8.5–10.5)
CHLORIDE SERPL-SCNC: 107 MMOL/L (ref 96–112)
CHLORIDE SERPL-SCNC: 113 MMOL/L (ref 96–112)
CO2 SERPL-SCNC: 19 MMOL/L (ref 20–33)
CO2 SERPL-SCNC: 19 MMOL/L (ref 20–33)
COLOR UR: ABNORMAL
COMMENT 1642: NORMAL
CREAT SERPL-MCNC: 0.8 MG/DL (ref 0.5–1.4)
CREAT SERPL-MCNC: 0.97 MG/DL (ref 0.5–1.4)
CULTURE IF INDICATED INDCX: YES UA CULTURE
EKG IMPRESSION: NORMAL
EOSINOPHIL # BLD AUTO: 0 K/UL (ref 0–0.51)
EOSINOPHIL # BLD AUTO: 0.02 K/UL (ref 0–0.51)
EOSINOPHIL NFR BLD: 0 % (ref 0–6.9)
EOSINOPHIL NFR BLD: 0.2 % (ref 0–6.9)
EPI CELLS #/AREA URNS HPF: NEGATIVE /HPF
ERYTHROCYTE [DISTWIDTH] IN BLOOD BY AUTOMATED COUNT: 48.1 FL (ref 35.9–50)
ERYTHROCYTE [DISTWIDTH] IN BLOOD BY AUTOMATED COUNT: 49 FL (ref 35.9–50)
FLUAV RNA SPEC QL NAA+PROBE: NEGATIVE
FLUBV RNA SPEC QL NAA+PROBE: NEGATIVE
GLOBULIN SER CALC-MCNC: 2.3 G/DL (ref 1.9–3.5)
GLOBULIN SER CALC-MCNC: 2.6 G/DL (ref 1.9–3.5)
GLUCOSE SERPL-MCNC: 113 MG/DL (ref 65–99)
GLUCOSE SERPL-MCNC: 135 MG/DL (ref 65–99)
GLUCOSE UR STRIP.AUTO-MCNC: NEGATIVE MG/DL
HCT VFR BLD AUTO: 30.6 % (ref 42–52)
HCT VFR BLD AUTO: 31.9 % (ref 42–52)
HGB BLD-MCNC: 10.3 G/DL (ref 14–18)
HGB BLD-MCNC: 10.5 G/DL (ref 14–18)
HYALINE CASTS #/AREA URNS LPF: ABNORMAL /LPF
IMM GRANULOCYTES # BLD AUTO: 0.07 K/UL (ref 0–0.11)
IMM GRANULOCYTES # BLD AUTO: 0.11 K/UL (ref 0–0.11)
IMM GRANULOCYTES NFR BLD AUTO: 0.5 % (ref 0–0.9)
IMM GRANULOCYTES NFR BLD AUTO: 0.9 % (ref 0–0.9)
INR PPP: 1.39 (ref 0.87–1.13)
KETONES UR STRIP.AUTO-MCNC: NEGATIVE MG/DL
LACTATE BLD-SCNC: 0.9 MMOL/L (ref 0.5–2)
LACTATE BLD-SCNC: 2.6 MMOL/L (ref 0.5–2)
LEUKOCYTE ESTERASE UR QL STRIP.AUTO: ABNORMAL
LYMPHOCYTES # BLD AUTO: 0.18 K/UL (ref 1–4.8)
LYMPHOCYTES # BLD AUTO: 0.8 K/UL (ref 1–4.8)
LYMPHOCYTES NFR BLD: 1.4 % (ref 22–41)
LYMPHOCYTES NFR BLD: 6.1 % (ref 22–41)
MCH RBC QN AUTO: 30.1 PG (ref 27–33)
MCH RBC QN AUTO: 30.3 PG (ref 27–33)
MCHC RBC AUTO-ENTMCNC: 32.9 G/DL (ref 33.7–35.3)
MCHC RBC AUTO-ENTMCNC: 33.7 G/DL (ref 33.7–35.3)
MCV RBC AUTO: 90 FL (ref 81.4–97.8)
MCV RBC AUTO: 91.4 FL (ref 81.4–97.8)
MICRO URNS: ABNORMAL
MONOCYTES # BLD AUTO: 0.96 K/UL (ref 0–0.85)
MONOCYTES # BLD AUTO: 1.18 K/UL (ref 0–0.85)
MONOCYTES NFR BLD AUTO: 7.7 % (ref 0–13.4)
MONOCYTES NFR BLD AUTO: 8.9 % (ref 0–13.4)
MORPHOLOGY BLD-IMP: NORMAL
NEUTROPHILS # BLD AUTO: 11.09 K/UL (ref 1.82–7.42)
NEUTROPHILS # BLD AUTO: 11.16 K/UL (ref 1.82–7.42)
NEUTROPHILS NFR BLD: 84 % (ref 44–72)
NEUTROPHILS NFR BLD: 89.7 % (ref 44–72)
NITRITE UR QL STRIP.AUTO: NEGATIVE
NRBC # BLD AUTO: 0 K/UL
NRBC # BLD AUTO: 0 K/UL
NRBC BLD-RTO: 0 /100 WBC
NRBC BLD-RTO: 0 /100 WBC
PH UR STRIP.AUTO: 6.5 [PH]
PLATELET # BLD AUTO: 154 K/UL (ref 164–446)
PLATELET # BLD AUTO: 165 K/UL (ref 164–446)
PMV BLD AUTO: 9.6 FL (ref 9–12.9)
PMV BLD AUTO: 9.9 FL (ref 9–12.9)
POTASSIUM SERPL-SCNC: 3.9 MMOL/L (ref 3.6–5.5)
POTASSIUM SERPL-SCNC: 4 MMOL/L (ref 3.6–5.5)
PROCALCITONIN SERPL-MCNC: 6.22 NG/ML
PROCALCITONIN SERPL-MCNC: 6.92 NG/ML
PROT SERPL-MCNC: 5.2 G/DL (ref 6–8.2)
PROT SERPL-MCNC: 5.7 G/DL (ref 6–8.2)
PROT UR QL STRIP: NEGATIVE MG/DL
PROTHROMBIN TIME: 16.8 SEC (ref 12–14.6)
RBC # BLD AUTO: 3.4 M/UL (ref 4.7–6.1)
RBC # BLD AUTO: 3.49 M/UL (ref 4.7–6.1)
RBC # URNS HPF: ABNORMAL /HPF
RBC UR QL AUTO: NEGATIVE
SODIUM SERPL-SCNC: 136 MMOL/L (ref 135–145)
SODIUM SERPL-SCNC: 139 MMOL/L (ref 135–145)
SP GR UR STRIP.AUTO: 1.02
TROPONIN I SERPL-MCNC: 0.05 NG/ML (ref 0–0.04)
TROPONIN I SERPL-MCNC: 0.06 NG/ML (ref 0–0.04)
UROBILINOGEN UR STRIP.AUTO-MCNC: 2 MG/DL
WBC # BLD AUTO: 12.5 K/UL (ref 4.8–10.8)
WBC # BLD AUTO: 13.2 K/UL (ref 4.8–10.8)
WBC #/AREA URNS HPF: ABNORMAL /HPF

## 2018-04-17 PROCEDURE — 700105 HCHG RX REV CODE 258: Performed by: INTERNAL MEDICINE

## 2018-04-17 PROCEDURE — 85025 COMPLETE CBC W/AUTO DIFF WBC: CPT

## 2018-04-17 PROCEDURE — G8979 MOBILITY GOAL STATUS: HCPCS | Mod: CI

## 2018-04-17 PROCEDURE — 71045 X-RAY EXAM CHEST 1 VIEW: CPT

## 2018-04-17 PROCEDURE — 770020 HCHG ROOM/CARE - TELE (206)

## 2018-04-17 PROCEDURE — 84145 PROCALCITONIN (PCT): CPT

## 2018-04-17 PROCEDURE — 93005 ELECTROCARDIOGRAM TRACING: CPT | Performed by: EMERGENCY MEDICINE

## 2018-04-17 PROCEDURE — 87040 BLOOD CULTURE FOR BACTERIA: CPT | Mod: 91

## 2018-04-17 PROCEDURE — 99223 1ST HOSP IP/OBS HIGH 75: CPT | Mod: AI | Performed by: INTERNAL MEDICINE

## 2018-04-17 PROCEDURE — G8980 MOBILITY D/C STATUS: HCPCS | Mod: CI

## 2018-04-17 PROCEDURE — 96365 THER/PROPH/DIAG IV INF INIT: CPT

## 2018-04-17 PROCEDURE — 700111 HCHG RX REV CODE 636 W/ 250 OVERRIDE (IP): Performed by: EMERGENCY MEDICINE

## 2018-04-17 PROCEDURE — 700105 HCHG RX REV CODE 258: Performed by: EMERGENCY MEDICINE

## 2018-04-17 PROCEDURE — 87641 MR-STAPH DNA AMP PROBE: CPT

## 2018-04-17 PROCEDURE — 97161 PT EVAL LOW COMPLEX 20 MIN: CPT

## 2018-04-17 PROCEDURE — 81001 URINALYSIS AUTO W/SCOPE: CPT

## 2018-04-17 PROCEDURE — 85610 PROTHROMBIN TIME: CPT

## 2018-04-17 PROCEDURE — 80053 COMPREHEN METABOLIC PANEL: CPT | Mod: 91

## 2018-04-17 PROCEDURE — 84484 ASSAY OF TROPONIN QUANT: CPT | Mod: 91

## 2018-04-17 PROCEDURE — 85730 THROMBOPLASTIN TIME PARTIAL: CPT

## 2018-04-17 PROCEDURE — A9270 NON-COVERED ITEM OR SERVICE: HCPCS | Performed by: INTERNAL MEDICINE

## 2018-04-17 PROCEDURE — G8978 MOBILITY CURRENT STATUS: HCPCS | Mod: CI

## 2018-04-17 PROCEDURE — 76705 ECHO EXAM OF ABDOMEN: CPT

## 2018-04-17 PROCEDURE — 87077 CULTURE AEROBIC IDENTIFY: CPT

## 2018-04-17 PROCEDURE — 96366 THER/PROPH/DIAG IV INF ADDON: CPT

## 2018-04-17 PROCEDURE — 36415 COLL VENOUS BLD VENIPUNCTURE: CPT

## 2018-04-17 PROCEDURE — 83605 ASSAY OF LACTIC ACID: CPT

## 2018-04-17 PROCEDURE — 96367 TX/PROPH/DG ADDL SEQ IV INF: CPT

## 2018-04-17 PROCEDURE — 700111 HCHG RX REV CODE 636 W/ 250 OVERRIDE (IP): Performed by: INTERNAL MEDICINE

## 2018-04-17 PROCEDURE — 87186 SC STD MICRODIL/AGAR DIL: CPT | Mod: 91

## 2018-04-17 PROCEDURE — 87502 INFLUENZA DNA AMP PROBE: CPT

## 2018-04-17 PROCEDURE — 700102 HCHG RX REV CODE 250 W/ 637 OVERRIDE(OP): Performed by: INTERNAL MEDICINE

## 2018-04-17 PROCEDURE — 87086 URINE CULTURE/COLONY COUNT: CPT

## 2018-04-17 RX ORDER — CLOPIDOGREL BISULFATE 75 MG/1
75 TABLET ORAL DAILY
Status: DISCONTINUED | OUTPATIENT
Start: 2018-04-17 | End: 2018-04-21 | Stop reason: HOSPADM

## 2018-04-17 RX ORDER — SODIUM CHLORIDE 9 MG/ML
INJECTION, SOLUTION INTRAVENOUS CONTINUOUS
Status: DISCONTINUED | OUTPATIENT
Start: 2018-04-17 | End: 2018-04-19

## 2018-04-17 RX ORDER — ONDANSETRON 2 MG/ML
4 INJECTION INTRAMUSCULAR; INTRAVENOUS EVERY 4 HOURS PRN
Status: DISCONTINUED | OUTPATIENT
Start: 2018-04-17 | End: 2018-04-21 | Stop reason: HOSPADM

## 2018-04-17 RX ORDER — BISACODYL 10 MG
10 SUPPOSITORY, RECTAL RECTAL
Status: DISCONTINUED | OUTPATIENT
Start: 2018-04-17 | End: 2018-04-21 | Stop reason: HOSPADM

## 2018-04-17 RX ORDER — SODIUM CHLORIDE 9 MG/ML
30 INJECTION, SOLUTION INTRAVENOUS
Status: DISCONTINUED | OUTPATIENT
Start: 2018-04-17 | End: 2018-04-21 | Stop reason: HOSPADM

## 2018-04-17 RX ORDER — ATORVASTATIN CALCIUM 10 MG/1
10 TABLET, FILM COATED ORAL EVERY EVENING
Status: DISCONTINUED | OUTPATIENT
Start: 2018-04-17 | End: 2018-04-17

## 2018-04-17 RX ORDER — METOPROLOL SUCCINATE 25 MG/1
12.5 TABLET, EXTENDED RELEASE ORAL DAILY
Status: DISCONTINUED | OUTPATIENT
Start: 2018-04-17 | End: 2018-04-17

## 2018-04-17 RX ORDER — DOXYCYCLINE 100 MG/1
100 TABLET ORAL EVERY 12 HOURS
Status: DISCONTINUED | OUTPATIENT
Start: 2018-04-17 | End: 2018-04-17

## 2018-04-17 RX ORDER — AMOXICILLIN 250 MG
2 CAPSULE ORAL 2 TIMES DAILY
Status: DISCONTINUED | OUTPATIENT
Start: 2018-04-17 | End: 2018-04-21 | Stop reason: HOSPADM

## 2018-04-17 RX ORDER — POLYETHYLENE GLYCOL 3350 17 G/17G
1 POWDER, FOR SOLUTION ORAL
Status: DISCONTINUED | OUTPATIENT
Start: 2018-04-17 | End: 2018-04-21 | Stop reason: HOSPADM

## 2018-04-17 RX ORDER — SODIUM CHLORIDE 9 MG/ML
500 INJECTION, SOLUTION INTRAVENOUS
Status: DISCONTINUED | OUTPATIENT
Start: 2018-04-17 | End: 2018-04-21 | Stop reason: HOSPADM

## 2018-04-17 RX ORDER — SODIUM CHLORIDE 9 MG/ML
1000 INJECTION, SOLUTION INTRAVENOUS ONCE
Status: COMPLETED | OUTPATIENT
Start: 2018-04-17 | End: 2018-04-17

## 2018-04-17 RX ORDER — ONDANSETRON 4 MG/1
4 TABLET, ORALLY DISINTEGRATING ORAL EVERY 4 HOURS PRN
Status: DISCONTINUED | OUTPATIENT
Start: 2018-04-17 | End: 2018-04-21 | Stop reason: HOSPADM

## 2018-04-17 RX ADMIN — PIPERACILLIN SODIUM AND TAZOBACTAM SODIUM 3.38 G: 3; .375 INJECTION, POWDER, FOR SOLUTION INTRAVENOUS at 20:35

## 2018-04-17 RX ADMIN — CLOPIDOGREL 75 MG: 75 TABLET, FILM COATED ORAL at 10:33

## 2018-04-17 RX ADMIN — VANCOMYCIN HYDROCHLORIDE 1500 MG: 100 INJECTION, POWDER, LYOPHILIZED, FOR SOLUTION INTRAVENOUS at 01:21

## 2018-04-17 RX ADMIN — SODIUM CHLORIDE 1000 ML: 9 INJECTION, SOLUTION INTRAVENOUS at 00:27

## 2018-04-17 RX ADMIN — ASPIRIN 81 MG: 81 TABLET, COATED ORAL at 10:33

## 2018-04-17 RX ADMIN — PIPERACILLIN SODIUM AND TAZOBACTAM SODIUM 3.38 G: 3; .375 INJECTION, POWDER, FOR SOLUTION INTRAVENOUS at 10:33

## 2018-04-17 RX ADMIN — PIPERACILLIN SODIUM AND TAZOBACTAM SODIUM 4.5 G: 4; .5 INJECTION, POWDER, FOR SOLUTION INTRAVENOUS at 00:41

## 2018-04-17 RX ADMIN — PIPERACILLIN SODIUM AND TAZOBACTAM SODIUM 3.38 G: 3; .375 INJECTION, POWDER, FOR SOLUTION INTRAVENOUS at 06:42

## 2018-04-17 RX ADMIN — SODIUM CHLORIDE: 9 INJECTION, SOLUTION INTRAVENOUS at 20:35

## 2018-04-17 RX ADMIN — SODIUM CHLORIDE: 9 INJECTION, SOLUTION INTRAVENOUS at 04:47

## 2018-04-17 ASSESSMENT — COGNITIVE AND FUNCTIONAL STATUS - GENERAL
DAILY ACTIVITIY SCORE: 24
TURNING FROM BACK TO SIDE WHILE IN FLAT BAD: A LITTLE
SUGGESTED CMS G CODE MODIFIER MOBILITY: CJ
MOVING TO AND FROM BED TO CHAIR: A LITTLE
CLIMB 3 TO 5 STEPS WITH RAILING: A LITTLE
TURNING FROM BACK TO SIDE WHILE IN FLAT BAD: A LITTLE
MOBILITY SCORE: 21
MOVING TO AND FROM BED TO CHAIR: A LITTLE
MOBILITY SCORE: 21
CLIMB 3 TO 5 STEPS WITH RAILING: A LITTLE
SUGGESTED CMS G CODE MODIFIER MOBILITY: CJ
SUGGESTED CMS G CODE MODIFIER DAILY ACTIVITY: CH

## 2018-04-17 ASSESSMENT — GAIT ASSESSMENTS
DISTANCE (FEET): 300
DEVIATION: BRADYKINETIC
ASSISTIVE DEVICE: FRONT WHEEL WALKER
GAIT LEVEL OF ASSIST: SUPERVISED

## 2018-04-17 ASSESSMENT — LIFESTYLE VARIABLES
ON A TYPICAL DAY WHEN YOU DRINK ALCOHOL HOW MANY DRINKS DO YOU HAVE: 1
HAVE PEOPLE ANNOYED YOU BY CRITICIZING YOUR DRINKING: NO
CONSUMPTION TOTAL: NEGATIVE
TOTAL SCORE: 0
EVER HAD A DRINK FIRST THING IN THE MORNING TO STEADY YOUR NERVES TO GET RID OF A HANGOVER: NO
HAVE YOU EVER FELT YOU SHOULD CUT DOWN ON YOUR DRINKING: NO
ALCOHOL_USE: YES
HOW MANY TIMES IN THE PAST YEAR HAVE YOU HAD 5 OR MORE DRINKS IN A DAY: 0
TOTAL SCORE: 0
AVERAGE NUMBER OF DAYS PER WEEK YOU HAVE A DRINK CONTAINING ALCOHOL: 7
TOTAL SCORE: 0
EVER_SMOKED: YES
EVER_SMOKED: YES
EVER FELT BAD OR GUILTY ABOUT YOUR DRINKING: NO

## 2018-04-17 ASSESSMENT — ENCOUNTER SYMPTOMS
SHORTNESS OF BREATH: 0
NECK PAIN: 0
CHILLS: 0
LOSS OF CONSCIOUSNESS: 0
WEAKNESS: 1
SPUTUM PRODUCTION: 0
SEIZURES: 0
NAUSEA: 0
ABDOMINAL PAIN: 0
HEADACHES: 0
VOMITING: 0
DIARRHEA: 0
WHEEZING: 0
COUGH: 0
SPEECH CHANGE: 0
FEVER: 0
FOCAL WEAKNESS: 0

## 2018-04-17 ASSESSMENT — PAIN SCALES - GENERAL
PAINLEVEL_OUTOF10: 0

## 2018-04-17 ASSESSMENT — PATIENT HEALTH QUESTIONNAIRE - PHQ9
1. LITTLE INTEREST OR PLEASURE IN DOING THINGS: NOT AT ALL
SUM OF ALL RESPONSES TO PHQ9 QUESTIONS 1 AND 2: 0
2. FEELING DOWN, DEPRESSED, IRRITABLE, OR HOPELESS: NOT AT ALL

## 2018-04-17 ASSESSMENT — COPD QUESTIONNAIRES
COPD SCREENING SCORE: 4
DURING THE PAST 4 WEEKS HOW MUCH DID YOU FEEL SHORT OF BREATH: NONE/LITTLE OF THE TIME
DO YOU EVER COUGH UP ANY MUCUS OR PHLEGM?: NO/ONLY WITH OCCASIONAL COLDS OR INFECTIONS
HAVE YOU SMOKED AT LEAST 100 CIGARETTES IN YOUR ENTIRE LIFE: YES

## 2018-04-17 NOTE — PROGRESS NOTES
2 RN skin check completed with STEFFANY Torres:     Generalized fragile and flaky skin.  Scab and minimal redness and blanching area on top of head.  Bilat ears red, flaky, and blanching (room air).  Bilat elbows calloused.  Sacral area red and blanching.  Bilat legs dry, peeling, and flaky.  Bilat boggy heels, floated on pillow.

## 2018-04-17 NOTE — ED NOTES
Wife, June [phone] would like to be updated with a message tonight with room.  Family friend Sunny at [phone]

## 2018-04-17 NOTE — H&P
Hospital Medicine History and Physical    Date of Service  4/17/2018    Chief Complaint  Chief Complaint   Patient presents with   • Weakness     generalized weakness beginning around two hours previous.  pt unable to get out of chair.  pt generally ambulates with bella.       History of Presenting Illness  88 y.o. 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 Patient states that he was sitting in his chair when he felt weak and was unable to get himself out of the chair and therefore he called EMS for assistance. Patient denied any chest pain, lightheadedness, headache, focal muscle weakness, change in speech or vision. In the ER he was found to have a temperature of 102.1, tachycardic at 101 and a oxygen saturation of 92% on room air. His white count is elevated at 4.5, lactic acid 2.6, AST 1:30, , alkaline phosphatase 43, total bili 3.1 all increased from his previous studies. UA reveals trace leukocyte esterase and 0-2 WBC. Influenza studies were negative. Chest x-ray revealed mild left basilar opacification and elevation of the left hemidiaphragm. EKG revealed sinus rhythm with no acute ischemic changes. Patient denies any headache, neck stiffness, cough, shortness of breath, chest pain, abdominal pain, nausea, vomiting, diarrhea or dysuria      Primary Care Physician  Fabiano Burk M.D.    Consultants  None    Code Status  DNR    Review of Systems  Review of Systems   Constitutional: Negative for chills, fever and malaise/fatigue.   Respiratory: Negative for cough, sputum production, shortness of breath and wheezing.   "  Cardiovascular: Negative for chest pain.   Gastrointestinal: Negative for abdominal pain, diarrhea, nausea and vomiting.   Genitourinary: Negative for dysuria.   Musculoskeletal: Negative for neck pain.   Neurological: Positive for weakness. Negative for speech change, focal weakness, seizures, loss of consciousness and headaches.   All other systems reviewed and are negative.       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   • ERCP W/REM FB AND/OR STENT CHG N/A 12/5/2016    Procedure: ERCP W/REM FB AND/OR STENT CHG - PYLORIC STENT REMOVAL SWAP METAL STENT PLACEMENT;  Surgeon: Sunny Grove M.D.;  Location: [facility];  Service:    • GASTROSCOPY-ENDO N/A 7/11/2016    Procedure: GASTROSCOPY-ENDO;  Surgeon: Sunny Grove M.D.;  Location: [facility];  Service:    • EGD W/ENDOSCOPIC ULTRASOUND N/A 7/11/2016    Procedure: EGD W/ENDOSCOPIC ULTRASOUND - UPPER LINEAR;  Surgeon: Sunny Grove M.D.;  Location: [facility];  Service:    • ERCP W/REM FB AND/OR STENT CHG N/A 7/11/2016    Procedure: ERCP W/REM FB AND/OR STENT CHG - STENT EXCHANGE;  Surgeon: Sunny Grove M.D.;  Location: [facility];  Service:    • DIAMANTE BY LAPAROSCOPY  2/10/2016    Procedure: DIAMANTE BY LAPAROSCOPY;  Surgeon: Elver Turcios M.D.;  Location: [facility];  Service:    • ERCP IN OR N/A 2/8/2016    Procedure: ERCP IN OR;  Surgeon: Sunny" FRANCISCO Grove M.D.;  Location: [facility];  Service:        Medications  No current facility-administered medications on file prior to encounter.      Current Outpatient Prescriptions on File Prior to Encounter   Medication Sig Dispense Refill   • losartan (COZAAR) 25 MG Tab Take 1 Tab by mouth every day. 90 Tab 3   • atorvastatin (LIPITOR) 10 MG Tab Take 1 Tab by mouth every evening. 90 Tab 3   • clopidogrel (PLAVIX) 75 MG Tab Take 1 Tab by mouth every day. 90 Tab 3   • metoprolol SR (TOPROL XL) 25 MG TABLET SR 24 HR Take 0.5 Tabs by mouth every day. 90 Tab 3   • potassium chloride SA (KDUR) 20 MEQ Tab CR Take 1 Tab by mouth every day. 90 Tab 3   • megestrol (MEGACE) 20 MG Tab Take 1 Tab by mouth every day. 90 Tab 3   • terazosin (HYTRIN) 5 MG Cap Take 1 Cap by mouth every day. 90 Cap 3   • aspirin EC 81 MG EC tablet Take 1 Tab by mouth every day. 30 Tab 3   • Omega-3 Fatty Acids (FISH OIL) 1000 MG Cap capsule Take 2 Caps by mouth every day. 60 Cap 3   • MAGNESIUM PO Take 250 mg by mouth 1 time daily as needed (supplement).     • Vitamin D, Cholecalciferol, 1000 UNITS Cap Take 2,000 Units by mouth every day at 6 PM.     • loratadine (CLARITIN) 10 MG Tab Take 10 mg by mouth every day.         Family History  Family History   Problem Relation Age of Onset   • No Known Problems Mother        Social History  Social History   Substance Use Topics   • Smoking status: Former Smoker     Years: 0.00     Types: Cigarettes     Quit date: 1960   • Smokeless tobacco: Never Used   • Alcohol use No       Allergies  No Known Allergies     Physical Exam  Laboratory   Hemodynamics  Temp (24hrs), Av.9 °C (102.1 °F), Min:38.9 °C (102.1 °F), Max:38.9 °C (102.1 °F)   Temperature: (!) 38.9 °C (102.1 °F)  Pulse  Av.9  Min: 75  Max: 100 Heart Rate (Monitored): 77  NIBP: 120/55      Respiratory      Respiration: 16, Pulse Oximetry: 94 %             Physical Exam   Constitutional: He is oriented to person, place, and  time. He appears well-developed and well-nourished. No distress.   HENT:   Head: Normocephalic and atraumatic.   Mouth/Throat: Oropharynx is clear and moist.   Eyes: Conjunctivae are normal. Pupils are equal, round, and reactive to light.   Neck: Normal range of motion. Neck supple.   No meningismus elicited   Cardiovascular: Regular rhythm and normal heart sounds.    Tachycardic   Pulmonary/Chest: Effort normal and breath sounds normal. No respiratory distress. He has no wheezes. He has no rales.   Abdominal: Soft. Bowel sounds are normal. He exhibits no distension. There is no tenderness.   Musculoskeletal: Normal range of motion. He exhibits no edema or tenderness.   Neurological: He is alert and oriented to person, place, and time. No cranial nerve deficit. Coordination normal.   Skin: Skin is warm.   Psychiatric: He has a normal mood and affect. His behavior is normal.   Nursing note and vitals reviewed.      Recent Labs      04/16/18   2353   WBC  12.5*   RBC  3.49*   HEMOGLOBIN  10.5*   HEMATOCRIT  31.9*   MCV  91.4   MCH  30.1   MCHC  32.9*   RDW  49.0   PLATELETCT  165   MPV  9.6     Recent Labs      04/16/18   2315   SODIUM  136   POTASSIUM  4.0   CHLORIDE  107   CO2  19*   GLUCOSE  135*   BUN  38*   CREATININE  0.97   CALCIUM  8.3*     Recent Labs      04/16/18   2315   ALTSGPT  186*   ASTSGOT  130*   ALKPHOSPHAT  403*   TBILIRUBIN  3.1*   GLUCOSE  135*                 Lab Results   Component Value Date    TROPONINI 3.28 (H) 02/21/2018     Urinalysis:    Lab Results  Component Value Date/Time   SPECGRAVITY 1.022 04/17/2018 0048   GLUCOSEUR Negative 04/17/2018 0048   KETONES Negative 04/17/2018 0048   NITRITE Negative 04/17/2018 0048   WBCURINE 0-2 (A) 04/17/2018 0048   RBCURINE 2-5 (A) 04/17/2018 0048   BACTERIA Negative 04/17/2018 0048   EPITHELCELL Negative 04/17/2018 0048        Imaging  DX-CHEST-PORTABLE (1 VIEW)   Final Result      1.  Mild left basilar opacification, likely atelectasis.      2.   Elevation of the left hemidiaphragm..           Assessment/Plan     I anticipate this patient will require at least two midnights for appropriate medical management, necessitating inpatient admission.    Sepsis (CMS-HCC)- (present on admission)   Assessment & Plan    This is sepsis (without associated acute organ dysfunction).   Unclear source at this time, suspect pneumonia versus ascending cholangitis  Chest x-ray reveals left basilar opacities however patient denies any cough or shortness of breath  Evidence of transaminitis and elevated total bili however patient denies any abdominal pain  The patient has been started on IV fluids for septic protocol, we will continue to trend lactate to resolution  The patient has been started empirically on IV vancomycin and IV Zosyn  I will order a pro-calcitonin, if within normal limits we will consider de-escalating antibiotics          Transaminitis- (present on admission)   Assessment & Plan    Patient has a known history of gallbladder cancer status post cholecystectomy, chemotherapy, radiation therapy and biliary stent placement  He denies any abdominal pain, diarrhea, nausea or vomiting  He also has an elevated total bili  I will order a right upper quadrant ultrasound for further evaluation  We will consider a GI consultation based on the results        Weakness- (present on admission)   Assessment & Plan    Patient denies any lightheadedness or chest pain  However even his recent cardiac catheterization findings I will monitor the patient on telemetry with serial EKGs and troponin to rule out an atypical presentation of ACS  We will continue treating sepsis with IV fluids and antibiotics  PT OT            Coronary artery disease involving native heart without angina pectoris- (present on admission)   Assessment & Plan    Continue aspirin, Plavix and metoprolol  Hold statin for worsening LFTs        History of gallbladder cancer- (present on admission)   Assessment &  Plan    Status post cholecystectomy, chemo, radiation and stent placement  With worsening transaminitis and elevated total bili, will order right upper quadrant ultrasound for further evaluation        Dyslipidemia- (present on admission)   Assessment & Plan    Hold statins for worsening LFTs            VTE prophylaxis: SCD.

## 2018-04-17 NOTE — ED PROVIDER NOTES
ER Provider Note     Scribed for Miguel Torres M.D. by Oliver Armstrong. 4/17/2018, 12:04 AM.    Primary Care Provider: Fabiano Burk M.D.  Means of Arrival: Ambulance   History obtained from: Patient  History limited by: None     CHIEF COMPLAINT  Chief Complaint   Patient presents with   • Weakness     generalized weakness beginning around two hours previous.  pt unable to get out of chair.  pt generally ambulates with bella.     HPI  Donato Burciaga is a 88 y.o. male who presents to the Emergency Department complaining of generalized weakness onset 2 hours prior to being seen. Per wife, he was in his room and she asked him to go to bed when she noticed he was unwell with a fever. The patient was then unable to get out of his chair so she called the ambulance for assistance. She states the he was feeling completely fine earlier today and was walking normally. He saw his physician, Dr. Manriquez, earlier for a normal check and did not have any issues so he went home. The patient has had a tremor in his left arm since a bowling incident awhile ago. Denies dysuria, abdominal pain, cough, urinary changes, rash, neck pain, pain elsewhere. He has a history of an MI on 2/20/18, cancer, and an aneurysm.    REVIEW OF SYSTEMS  See HPI for further details. All other systems are negative.    C.  PAST MEDICAL HISTORY   has a past medical history of Alcohol use (2016); Arthritis; Cancer (CMS-HCC); Cataract; Dental disorder; High cholesterol; History of chemotherapy (last dose 9/2016); History of radiation therapy (last done on 09/28/2016); Hyperlipidemia, mixed (1990); Hypertension; MI (myocardial infarction); and Snoring.    SURGICAL HISTORY   has a past surgical history that includes ercp in or (N/A, 2/8/2016); florinda by laparoscopy (2/10/2016); gastroscopy-endo (N/A, 7/11/2016); egd w/endoscopic ultrasound (N/A, 7/11/2016); ercp w/rem fb and/or stent chg (N/A, 7/11/2016); ercp w/rem fb and/or stent chg (N/A, 12/5/2016);  "and cardiac cath (02/21/2018).    SOCIAL HISTORY  Social History   Substance Use Topics   • Smoking status: Former Smoker     Years: 0.00     Types: Cigarettes     Quit date: 1/1/1960   • Smokeless tobacco: Never Used   • Alcohol use No      History   Drug Use No     FAMILY HISTORY  Family History   Problem Relation Age of Onset   • No Known Problems Mother      CURRENT MEDICATIONS  Home Medications     Reviewed by Ricco Stinson R.N. (Registered Nurse) on 04/16/18 at 2354  Med List Status: Not Addressed   Medication Last Dose Status   aspirin EC 81 MG EC tablet 4/16/2018 Active   atorvastatin (LIPITOR) 10 MG Tab 4/16/2018 Active   clopidogrel (PLAVIX) 75 MG Tab 4/16/2018 Active   loratadine (CLARITIN) 10 MG Tab 4/16/2018 Active   losartan (COZAAR) 25 MG Tab 4/16/2018 Active   MAGNESIUM PO 4/16/2018 Active   megestrol (MEGACE) 20 MG Tab 4/16/2018 Active   metoprolol SR (TOPROL XL) 25 MG TABLET SR 24 HR 4/16/2018 Active   Omega-3 Fatty Acids (FISH OIL) 1000 MG Cap capsule 4/16/2018 Active   potassium chloride SA (KDUR) 20 MEQ Tab CR 4/16/2018 Active   terazosin (HYTRIN) 5 MG Cap 4/16/2018 Active   Vitamin D, Cholecalciferol, 1000 UNITS Cap 4/16/2018 Active              ALLERGIES  No Known Allergies    PHYSICAL EXAM  VITAL SIGNS: Temp (!) 38.9 °C (102.1 °F)   Ht 1.702 m (5' 7\")   Wt 59 kg (130 lb)   BMI 20.36 kg/m²    Constitutional: Alert in no apparent distress. Warm to touch.  HENT: No signs of trauma, Bilateral external ears normal, Nose normal.   Eyes: Pupils are equal and reactive, Conjunctiva normal, Non-icteric.   Neck: Normal range of motion, No tenderness, Supple, No stridor.   Lymphatic: No lymphadenopathy noted.   Cardiovascular: Tachycardic rate and regular rhythm, no murmurs.   Thorax & Lungs: Normal breath sounds, No respiratory distress, No wheezing, No chest tenderness.   Abdomen: Bowel sounds normal, Soft, No tenderness, No masses, No pulsatile masses. No peritoneal signs.  Skin: Warm, Dry, " No erythema, No rash.   Back: No bony tenderness, No CVA tenderness.   Extremities: Intact distal pulses, No edema, No tenderness, No cyanosis.  Musculoskeletal: Good range of motion in all major joints. No tenderness to palpation or major deformities noted.   Neurologic: Alert, General weakness, Bilateral tremor in upper extremities. Normal sensory function.  Psychiatric: Affect normal, Judgment normal, Mood normal.     DIAGNOSTIC STUDIES / PROCEDURES    EKG Interpretation:  Interpreted by me    12 Lead EKG interpreted by me to show:  Tachycardic sinus rhythm  Rate 100  No ST elevation or depression  My impression of this EKG: Does not indicate ischemia or arrhythmia at this time.     LABS  Labs Reviewed   CBC WITH DIFFERENTIAL - Abnormal; Notable for the following:        Result Value    WBC 12.5 (*)     RBC 3.49 (*)     Hemoglobin 10.5 (*)     Hematocrit 31.9 (*)     MCHC 32.9 (*)     Neutrophils-Polys 89.70 (*)     Lymphocytes 1.40 (*)     Neutrophils (Absolute) 11.16 (*)     Lymphs (Absolute) 0.18 (*)     Monos (Absolute) 0.96 (*)     All other components within normal limits   COMP METABOLIC PANEL - Abnormal; Notable for the following:     Co2 19 (*)     Glucose 135 (*)     Bun 38 (*)     Calcium 8.3 (*)     AST(SGOT) 130 (*)     ALT(SGPT) 186 (*)     Alkaline Phosphatase 403 (*)     Total Bilirubin 3.1 (*)     Albumin 3.1 (*)     Total Protein 5.7 (*)     All other components within normal limits   LACTIC ACID - Abnormal; Notable for the following:     Lactic Acid 2.6 (*)     All other components within normal limits   URINALYSIS,CULTURE IF INDICATED - Abnormal; Notable for the following:     Bilirubin Small (*)     Leukocyte Esterase Trace (*)     All other components within normal limits   URINE MICROSCOPIC (W/UA) - Abnormal; Notable for the following:     WBC 0-2 (*)     RBC 2-5 (*)     All other components within normal limits   BLOOD CULTURE    Narrative:     Per Hospital Policy: Only change Specimen  "Src: to \"Line\" if  specified by physician order.   BLOOD CULTURE    Narrative:     Per Hospital Policy: Only change Specimen Src: to \"Line\" if  specified by physician order.   INFLUENZA A/B BY PCR   URINE CULTURE(NEW)   ESTIMATED GFR   PROTHROMBIN TIME    Narrative:     If not done within the last 4 hours  Indicate which anticoagulants the patient is on:->NONE   APTT    Narrative:     If not done within the last 4 hours  Indicate which anticoagulants the patient is on:->NONE   PROCALCITONIN    Narrative:     If not done within the last 4 hours  Indicate which anticoagulants the patient is on:->NONE   LACTIC ACID   MRSA BY PCR (AMP)         All labs reviewed by me.    RADIOLOGY  DX-CHEST-PORTABLE (1 VIEW)   Final Result      1.  Mild left basilar opacification, likely atelectasis.      2.  Elevation of the left hemidiaphragm..      US-ABDOMEN LIMITED    (Results Pending)      The radiologist's interpretation of all radiological studies have been reviewed by me.    COURSE & MEDICAL DECISION MAKING  Pertinent Labs & Imaging studies reviewed. (See chart for details)    This is a 88 y.o. male that presents with fever and generalized weakness. The patient has a nonfocal neurologic exam. I'm concerned this patient could have urinary tract infection versus pneumonia versus other occult blood borne infection. He has no signs or symptoms of meningitis at this time. I will treat the patient as if he is septic given that he was tachycardic a short time after coming into the hospital. I will perform below workup and given. Vioxx.    12:04 AM - Patient seen and examined at bedside. Ordered DX-Chest, Blood Culture Influenza Rapid, CBC, CMP, Lactic Acid, Urinalysis. Patient will be medicated with Vancomycin, 4.5g Zosyn for his symptoms and IV fluids for tachycardia.    1:33 AM - Consulted with Dr. Brown, Hospitalist, who is aware of the patient and agrees to admit the patient into their care.  I examined the patient 4/17/2018 5:01 " "AM  Vital Signs:Pulse 76   Temp (!) 38.9 °C (102.1 °F)   Resp 16   Ht 1.702 m (5' 7\")   Wt 59 kg (130 lb)   SpO2 92%   BMI 20.36 kg/m²   Cardiac examination significant for Regular rate and rhythm  Pulmonary examination significant for Clear lung fileds  Capillary refill is brisk  Peripheral Pulse is 2+   Skin is normal      The patient's tachycardia resolved with fluids. The patient is found have a lactic acid of 2.6. The patient did have a CO2 of 19 as well as AST and ALT elevations and bilirubin elevation. The patient had no right upper quadrant pain therefore do not believe this related to his gallbladder. In addition the patient did have a leukocytosis of 12.5. I'm concerned that the elevated liver enzymes related to shock. The patient has no infection in his urine. The patient's BUNs and is very elevated but he has normal creatinine.     1:39 AM - Patient seen at bedside and is improving after IV fluids. I discussed the current lab and radiology results and explained they will be admitted for further evaluation. The patient is understanding and agreeable. The chest x-ray shows atelectasis versus pneumonia. He will be treated with broad-spectrum antibiotics would cover an infection in this area.     DISPOSITION:  Patient will be admitted to Dr. Brown, Hospitalist, in guarded condition.    FINAL IMPRESSION  1. Pneumonia of left lower lobe due to infectious organism (CMS-HCC)    2. Fever, unspecified fever cause    3. Sepsis, due to unspecified organism (CMS-HCC)    4. Hyperbilirubinemia       Oliver MCBRIDE (Bronson), am scribing for, and in the presence of, Miguel Torres M.D..    Electronically signed by: Oliver Armstrong (Bronson), 4/17/2018    Miguel MCBRIDE M.D. personally performed the services described in this documentation, as scribed by Oliver Armstrong in my presence, and it is both accurate and complete.     The note accurately reflects work and decisions made by me.  Miguel Torres "  4/17/2018  5:05 AM

## 2018-04-17 NOTE — PROGRESS NOTES
Admitted today, pneumonia versus cholangitis. Denies cough, congestion, abdominal pain, nausea or vomiting. Change to IV Zosyn for better coverage. LFTs trending down. Appears to have chronically elevated LFTs.

## 2018-04-17 NOTE — PROGRESS NOTES
Pt arrived to unit via [facility] with ACLS RN and Zoll monitor on. Tele box on pt, confirmed with Corinne (CCT) from monitor room. Per Corinne, pt is in sinus rhythm with heart rate of 74. A&O x 4. Pt ambulated with 1 assist  via front wheel walker from [facility] to hospital bed, tolerated well. Pt oriented to call light and unit, verbalized understanding. Fall precautions in place. Upper bed rails up, treaded socks on. Bed locked and in lowest position with controls on. Call light and bedside table within reach. Assessment completed. Will continue to monitor.

## 2018-04-17 NOTE — THERAPY
"Physical Therapy Evaluation completed.   Bed Mobility:  Supine to Sit: Supervised  Transfers: Sit to Stand: Supervised with FWW  Gait: Level Of Assist: Supervised with Front-Wheel Walker       Plan of Care: Patient with no further skilled PT needs in the acute care setting at this time  Discharge Recommendations: Equipment: No Equipment Needed. See below.    Pt presents to physical theray for mobility assessment. He is able to perform bed mobility with SUP and use of bed features, though anticipate pt is capeable of performing bed mobility without bed features. He is able to perform sit<>stand/transfers and hallway ambulation with FWW and SUP. He demo no LOB, and states that his normal ambulation speed is slow, and that he was walking at baseline speed. Pt states that he likes to use no AD at home, and will use SPC when going into community, however, recommended that pt use FWW during recovery period for safety, pt and wife agreed. Upon medical clearance, pt functionally capable of return home, where wife is able to assist with IADLs, and they have people who drive them to appointments and the grocery store (confirmed by one of the drivers). Would recommend home PT for device weaning and strengthening. Pt and wife state that they have no concerns about returning home. No further acute PT needs at this time.     See \"Rehab Therapy-Acute\" Patient Summary Report for complete documentation.     "

## 2018-04-17 NOTE — ED NOTES
Attempted to call pt wife and friend sunny per pt request.  Unable to leave message for wife, left message for Sunny

## 2018-04-17 NOTE — ED TRIAGE NOTES
Chief Complaint   Patient presents with   • Weakness     generalized weakness beginning around two hours previous.  pt unable to get out of chair.  pt generally ambulates with bella.   Pt states sudden onset of weakness.  Pt had MI feb 20 and seen at this facility.  Pt on monitor, blood sent to lab.  Chart up for ERP

## 2018-04-18 PROBLEM — R78.81 GRAM-NEGATIVE BACTEREMIA: Status: ACTIVE | Noted: 2018-04-18

## 2018-04-18 PROBLEM — E87.6 HYPOKALEMIA: Status: ACTIVE | Noted: 2018-04-18

## 2018-04-18 PROBLEM — G93.40 ENCEPHALOPATHY: Status: ACTIVE | Noted: 2018-04-18

## 2018-04-18 LAB
ALBUMIN SERPL BCP-MCNC: 2.8 G/DL (ref 3.2–4.9)
ALBUMIN/GLOB SERPL: 1.1 G/DL
ALP SERPL-CCNC: 360 U/L (ref 30–99)
ALT SERPL-CCNC: 125 U/L (ref 2–50)
ANION GAP SERPL CALC-SCNC: 8 MMOL/L (ref 0–11.9)
AST SERPL-CCNC: 66 U/L (ref 12–45)
BASOPHILS # BLD AUTO: 0.1 % (ref 0–1.8)
BASOPHILS # BLD: 0.01 K/UL (ref 0–0.12)
BILIRUB SERPL-MCNC: 3 MG/DL (ref 0.1–1.5)
BUN SERPL-MCNC: 18 MG/DL (ref 8–22)
CALCIUM SERPL-MCNC: 8.3 MG/DL (ref 8.5–10.5)
CHLORIDE SERPL-SCNC: 110 MMOL/L (ref 96–112)
CO2 SERPL-SCNC: 21 MMOL/L (ref 20–33)
CREAT SERPL-MCNC: 0.69 MG/DL (ref 0.5–1.4)
EOSINOPHIL # BLD AUTO: 0.1 K/UL (ref 0–0.51)
EOSINOPHIL NFR BLD: 1.3 % (ref 0–6.9)
ERYTHROCYTE [DISTWIDTH] IN BLOOD BY AUTOMATED COUNT: 48.1 FL (ref 35.9–50)
GLOBULIN SER CALC-MCNC: 2.5 G/DL (ref 1.9–3.5)
GLUCOSE SERPL-MCNC: 107 MG/DL (ref 65–99)
HCT VFR BLD AUTO: 30.9 % (ref 42–52)
HGB BLD-MCNC: 10.4 G/DL (ref 14–18)
IMM GRANULOCYTES # BLD AUTO: 0.02 K/UL (ref 0–0.11)
IMM GRANULOCYTES NFR BLD AUTO: 0.3 % (ref 0–0.9)
LYMPHOCYTES # BLD AUTO: 0.68 K/UL (ref 1–4.8)
LYMPHOCYTES NFR BLD: 8.9 % (ref 22–41)
MCH RBC QN AUTO: 30.5 PG (ref 27–33)
MCHC RBC AUTO-ENTMCNC: 33.7 G/DL (ref 33.7–35.3)
MCV RBC AUTO: 90.6 FL (ref 81.4–97.8)
MONOCYTES # BLD AUTO: 0.62 K/UL (ref 0–0.85)
MONOCYTES NFR BLD AUTO: 8.1 % (ref 0–13.4)
MRSA DNA SPEC QL NAA+PROBE: NORMAL
NEUTROPHILS # BLD AUTO: 6.21 K/UL (ref 1.82–7.42)
NEUTROPHILS NFR BLD: 81.3 % (ref 44–72)
NRBC # BLD AUTO: 0 K/UL
NRBC BLD-RTO: 0 /100 WBC
PLATELET # BLD AUTO: 148 K/UL (ref 164–446)
PMV BLD AUTO: 9.7 FL (ref 9–12.9)
POTASSIUM SERPL-SCNC: 3.4 MMOL/L (ref 3.6–5.5)
PROT SERPL-MCNC: 5.3 G/DL (ref 6–8.2)
RBC # BLD AUTO: 3.41 M/UL (ref 4.7–6.1)
SIGNIFICANT IND 70042: NORMAL
SITE SITE: NORMAL
SODIUM SERPL-SCNC: 139 MMOL/L (ref 135–145)
SOURCE SOURCE: NORMAL
TROPONIN I SERPL-MCNC: 0.04 NG/ML (ref 0–0.04)
TROPONIN I SERPL-MCNC: 0.05 NG/ML (ref 0–0.04)
TROPONIN I SERPL-MCNC: 0.05 NG/ML (ref 0–0.04)
WBC # BLD AUTO: 7.6 K/UL (ref 4.8–10.8)

## 2018-04-18 PROCEDURE — A9270 NON-COVERED ITEM OR SERVICE: HCPCS | Performed by: INTERNAL MEDICINE

## 2018-04-18 PROCEDURE — 770020 HCHG ROOM/CARE - TELE (206)

## 2018-04-18 PROCEDURE — 700102 HCHG RX REV CODE 250 W/ 637 OVERRIDE(OP): Performed by: INTERNAL MEDICINE

## 2018-04-18 PROCEDURE — 99233 SBSQ HOSP IP/OBS HIGH 50: CPT | Performed by: FAMILY MEDICINE

## 2018-04-18 PROCEDURE — 700105 HCHG RX REV CODE 258: Performed by: INTERNAL MEDICINE

## 2018-04-18 PROCEDURE — A9270 NON-COVERED ITEM OR SERVICE: HCPCS | Performed by: FAMILY MEDICINE

## 2018-04-18 PROCEDURE — G8988 SELF CARE GOAL STATUS: HCPCS | Mod: CI

## 2018-04-18 PROCEDURE — 84484 ASSAY OF TROPONIN QUANT: CPT

## 2018-04-18 PROCEDURE — 700111 HCHG RX REV CODE 636 W/ 250 OVERRIDE (IP): Performed by: HOSPITALIST

## 2018-04-18 PROCEDURE — 700101 HCHG RX REV CODE 250: Performed by: HOSPITALIST

## 2018-04-18 PROCEDURE — 700102 HCHG RX REV CODE 250 W/ 637 OVERRIDE(OP): Performed by: FAMILY MEDICINE

## 2018-04-18 PROCEDURE — 36415 COLL VENOUS BLD VENIPUNCTURE: CPT

## 2018-04-18 PROCEDURE — G8989 SELF CARE D/C STATUS: HCPCS | Mod: CI

## 2018-04-18 PROCEDURE — G8987 SELF CARE CURRENT STATUS: HCPCS | Mod: CI

## 2018-04-18 PROCEDURE — 97165 OT EVAL LOW COMPLEX 30 MIN: CPT

## 2018-04-18 PROCEDURE — 700111 HCHG RX REV CODE 636 W/ 250 OVERRIDE (IP): Performed by: INTERNAL MEDICINE

## 2018-04-18 RX ORDER — METOPROLOL SUCCINATE 25 MG/1
12.5 TABLET, EXTENDED RELEASE ORAL DAILY
Status: DISCONTINUED | OUTPATIENT
Start: 2018-04-18 | End: 2018-04-21 | Stop reason: HOSPADM

## 2018-04-18 RX ORDER — ENALAPRILAT 1.25 MG/ML
2.5 INJECTION INTRAVENOUS EVERY 6 HOURS PRN
Status: DISCONTINUED | OUTPATIENT
Start: 2018-04-18 | End: 2018-04-19

## 2018-04-18 RX ORDER — LOSARTAN POTASSIUM 25 MG/1
25 TABLET ORAL DAILY
Status: DISCONTINUED | OUTPATIENT
Start: 2018-04-18 | End: 2018-04-20

## 2018-04-18 RX ORDER — POTASSIUM CHLORIDE 20 MEQ/1
20 TABLET, EXTENDED RELEASE ORAL DAILY
Status: DISCONTINUED | OUTPATIENT
Start: 2018-04-18 | End: 2018-04-19

## 2018-04-18 RX ORDER — LABETALOL HYDROCHLORIDE 5 MG/ML
10 INJECTION, SOLUTION INTRAVENOUS EVERY 4 HOURS PRN
Status: DISCONTINUED | OUTPATIENT
Start: 2018-04-18 | End: 2018-04-19

## 2018-04-18 RX ADMIN — ENALAPRILAT 2.5 MG: 1.25 INJECTION INTRAVENOUS at 19:54

## 2018-04-18 RX ADMIN — PIPERACILLIN SODIUM AND TAZOBACTAM SODIUM 3.38 G: 3; .375 INJECTION, POWDER, FOR SOLUTION INTRAVENOUS at 14:31

## 2018-04-18 RX ADMIN — CLOPIDOGREL 75 MG: 75 TABLET, FILM COATED ORAL at 09:35

## 2018-04-18 RX ADMIN — PIPERACILLIN SODIUM AND TAZOBACTAM SODIUM 3.38 G: 3; .375 INJECTION, POWDER, FOR SOLUTION INTRAVENOUS at 06:32

## 2018-04-18 RX ADMIN — LOSARTAN POTASSIUM 25 MG: 25 TABLET, FILM COATED ORAL at 14:23

## 2018-04-18 RX ADMIN — LABETALOL HYDROCHLORIDE 10 MG: 5 INJECTION, SOLUTION INTRAVENOUS at 17:09

## 2018-04-18 RX ADMIN — ASPIRIN 81 MG: 81 TABLET, COATED ORAL at 09:35

## 2018-04-18 RX ADMIN — PIPERACILLIN SODIUM AND TAZOBACTAM SODIUM 3.38 G: 3; .375 INJECTION, POWDER, FOR SOLUTION INTRAVENOUS at 21:49

## 2018-04-18 RX ADMIN — POTASSIUM CHLORIDE 20 MEQ: 1500 TABLET, EXTENDED RELEASE ORAL at 14:24

## 2018-04-18 RX ADMIN — METOPROLOL SUCCINATE 12.5 MG: 25 TABLET, EXTENDED RELEASE ORAL at 14:23

## 2018-04-18 ASSESSMENT — ENCOUNTER SYMPTOMS
SORE THROAT: 0
ABDOMINAL PAIN: 0
NERVOUS/ANXIOUS: 0
WHEEZING: 0
NAUSEA: 0
SHORTNESS OF BREATH: 0
WEAKNESS: 1
BLOOD IN STOOL: 0
FEVER: 0
BACK PAIN: 0
HEARTBURN: 0
DIZZINESS: 0
COUGH: 0
BLURRED VISION: 0
VOMITING: 0
CHILLS: 0

## 2018-04-18 ASSESSMENT — COGNITIVE AND FUNCTIONAL STATUS - GENERAL
HELP NEEDED FOR BATHING: A LITTLE
DAILY ACTIVITIY SCORE: 23
SUGGESTED CMS G CODE MODIFIER DAILY ACTIVITY: CI

## 2018-04-18 ASSESSMENT — PAIN SCALES - GENERAL
PAINLEVEL_OUTOF10: 0

## 2018-04-18 ASSESSMENT — ACTIVITIES OF DAILY LIVING (ADL): TOILETING: INDEPENDENT

## 2018-04-18 NOTE — PROGRESS NOTES
Mateo from Lab called with critical result of positive blood cultures (gram neg rods) at 1850. Critical lab result read back to Mateo.   Dr. Easton notified of critical lab result at 1852.  Critical lab result read back by Dr. Easton. No new orders received. MD covering with established Zosyn treatment.

## 2018-04-18 NOTE — CONSULTS
DATE OF SERVICE:  04/18/2018    REASON FOR CONSULTATION:  Elevated liver enzymes, jaundice, sepsis,   bacteremia, suspected cholangitis.    REQUESTING PHYSICIAN:  Terry Easton MD    HISTORY OF PRESENT ILLNESS:  Thank you very much for asking me to see this   very pleasant 88-year-old man who is followed by my partner Dr. Grove.  He has   a history of biliary ampullary cancer, which has been treated with   chemotherapy, radiation therapy, and ERCP with a 4 cm x 10 mm metal stent,   which was placed in October 2016.  He has been doing fairly well, but over the   last few months, his liver enzymes have been slightly elevated.    Approximately 2 hours prior to presentation to the hospital, he had fairly   sudden onset fevers and weakness.  Upon presentation, he was found to febrile   with a temperature of 102.1 Fahrenheit and had a leukocytosis.  Blood cultures   were drawn and these have since come back with gram-negative rods.  Pneumonia   was ruled out and he is suspected to have cholangitis.  He has been treated   with antibiotics.  His blood pressure is normal now.  His temperature has   broken, his leukocytosis has improved and he is feeling back to baseline.  He   denies chest pain or shortness of breath.    REVIEW OF SYSTEMS:  A 10-system review of systems performed by myself,   pertinent positives and negatives stated, otherwise noncontributory.    PAST MEDICAL HISTORY:  1.  Biliary ampullary malignancy.  2.  Hyperlipidemia.  3.  Hypertension.  4.  Coronary artery disease.    PAST SURGICAL HISTORY:  1.  Cardiac catheterization.  2.  ERCP.  3.  Cholecystectomy.    ADVERSE DRUG REACTIONS:  No known drug allergies.    MEDICATIONS:  Reviewed by myself.  Please see the chart for detail.    SOCIAL HISTORY:  Drinks a small amount of alcohol daily.  Does not smoke.    FAMILY HISTORY:  Negative for gastrointestinal, hepatobiliary, or pancreatic   disease.    LABORATORY DATA:  White count currently 7.4, but was  13.2 on admit; hemoglobin   10.4, hematocrit 30.9, platelets 148.  Sodium 139, potassium 3.4, chloride   110, bicarbonate 21, BUN 18, creatinine 0.69, AST 66, , alkaline   phosphatase 360, total bilirubin 3.0.  INR 1.39.  Blood culture positive for   gram-negative rods.  Abdominal ultrasound shows intra and extrahepatic biliary   ductal dilatation and metal stent in place.    PHYSICAL EXAMINATION:  VITAL SIGNS:  Temperature 98.7, pulse of 80, blood pressure 162/71,   respirations 16.  GENERAL:  He is in no distress.  He is alert, oriented x3.  HEENT:  Not obviously jaundiced, but sclerae are icteric.  Mucous membranes   are pink and moist.  LUNGS:  Clear bilaterally.  HEART:  Sounds regular.  ABDOMEN:  Soft, nontender, nondistended.  Bowel sounds normal.    ASSESSMENT:  1.  Elevated liver tests consistent with obstruction.  2.  Sepsis.  3.  Bacteremia.  4.  Fever, resolved.  5.  Leukocytosis, resolved.  6.  History of biliary ampullary malignancy.  7.  Mild elevation in troponin consistent with strain.    DISCUSSION:  This does indeed seem as though this could have been an episode   of cholangitis.  He does have elevated liver enzymes and ductal dilatation.    PLAN:  1.  Liquid diet now.  2.  N.p.o. past midnight.  3.  Agree with antibiotics.  4.  ERCP will be planned for tomorrow with Dr. Blackwell who I will sign out with.    Thank you very much for allowing me to participate in the care of this very   nice gentleman.  If you have any questions, please do not hesitate to call.       ____________________________________     MD NASIM MERCADO / BRIDGET    DD:  04/18/2018 11:33:49  DT:  04/18/2018 11:49:57    D#:  [id number]  Job#:  [id number]

## 2018-04-18 NOTE — PROGRESS NOTES
[facility] Hospitalist Progress Note    Date of Service: 2018    Chief Complaint  88 y.o. male admitted 2018 with Cholangitis, Bacteremia.    Interval Problem Update  Cholangitis - history of biliary stent, exchange done on 2016  Bacteremia - gram neg rods  HTN - not controlled  Enceph - periods of confusion  Low K    Consultants/Specialty  Consult GI    Disposition  TBD        Review of Systems   Constitutional: Positive for malaise/fatigue. Negative for chills and fever.   HENT: Negative for hearing loss and sore throat.    Eyes: Negative for blurred vision.   Respiratory: Negative for cough, shortness of breath and wheezing.    Cardiovascular: Negative for chest pain and leg swelling.   Gastrointestinal: Negative for abdominal pain, blood in stool, heartburn, melena, nausea and vomiting.   Genitourinary: Negative for dysuria.   Musculoskeletal: Negative for back pain.   Neurological: Positive for weakness. Negative for dizziness.   Psychiatric/Behavioral: The patient is not nervous/anxious.       Physical Exam  Laboratory/Imaging   Hemodynamics  Temp (24hrs), Av.1 °C (98.8 °F), Min:37.1 °C (98.7 °F), Max:37.3 °C (99.2 °F)   Temperature: 37.1 °C (98.7 °F)  Pulse  Av.8  Min: 65  Max: 100    Blood Pressure : (!) 178/88 (RN notified)      Respiratory      Respiration: 20, Pulse Oximetry: 96 %, O2 Daily Delivery Respiratory : Room Air with O2 Available     Work Of Breathing / Effort: Mild  RUL Breath Sounds: Clear, RML Breath Sounds: Diminished, RLL Breath Sounds: Diminished, REBECCA Breath Sounds: Clear, LLL Breath Sounds: Diminished    Fluids    Intake/Output Summary (Last 24 hours) at 18 1149  Last data filed at 18   Gross per 24 hour   Intake                0 ml   Output              200 ml   Net             -200 ml       Nutrition  Orders Placed This Encounter   Procedures   • Diet Order     Standing Status:   Standing     Number of Occurrences:   1     Order Specific Question:    Diet:     Answer:   Clear Liquid [10]   • DIET NPO     Standing Status:   Standing     Number of Occurrences:   8     Order Specific Question:   Restrict to:     Answer:   Strict [1]     Physical Exam   Constitutional: He appears well-developed.   HENT:   Head: Normocephalic and atraumatic.   Eyes: Conjunctivae are normal. Pupils are equal, round, and reactive to light.   Neck: No tracheal deviation present. No thyromegaly present.   Cardiovascular: Normal rate and regular rhythm.    Pulmonary/Chest: Effort normal and breath sounds normal.   Abdominal: Soft. Bowel sounds are normal. He exhibits no distension. There is no tenderness. There is no rebound and no guarding.   Lymphadenopathy:     He has no cervical adenopathy.   Neurological: He is alert.   Oriented to person only   Skin: Skin is warm and dry.   Nursing note and vitals reviewed.      Recent Labs      04/16/18   2353  04/17/18   0831  04/17/18   2339   WBC  12.5*  13.2*  7.6   RBC  3.49*  3.40*  3.41*   HEMOGLOBIN  10.5*  10.3*  10.4*   HEMATOCRIT  31.9*  30.6*  30.9*   MCV  91.4  90.0  90.6   MCH  30.1  30.3  30.5   MCHC  32.9*  33.7  33.7   RDW  49.0  48.1  48.1   PLATELETCT  165  154*  148*   MPV  9.6  9.9  9.7     Recent Labs      04/16/18   2315  04/17/18   0831  04/17/18   2339   SODIUM  136  139  139   POTASSIUM  4.0  3.9  3.4*   CHLORIDE  107  113*  110   CO2  19*  19*  21   GLUCOSE  135*  113*  107*   BUN  38*  27*  18   CREATININE  0.97  0.80  0.69   CALCIUM  8.3*  8.0*  8.3*     Recent Labs      04/17/18   0451   APTT  28.0   INR  1.39*                  Assessment/Plan     * Cholangitis- (present on admission)   Assessment & Plan    IV Zosyn  consult GI        Encephalopathy- (present on admission)   Assessment & Plan    Multifactorial  freqeent orientation, avoid bzd and anticholinergics        Gram-negative bacteremia- (present on admission)   Assessment & Plan    IV Zosyn        Sepsis (CMS-HCC)- (present on admission)   Assessment & Plan     This is sepsis (without associated acute organ dysfunction).   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

## 2018-04-18 NOTE — PROGRESS NOTES
"Pt jumping up to the bathroom with bed alarm going off. Pt AOx4, however, slightly confused. Notified MD at bedside rounds. Educated pt on calling to ambulate and use of call light. Pt irritable and states, \"ya, I know what the red button is for.\" Educated pt on need for lap belt as a reminder to call to ambulate. Pt refusing lap belt. Bed alarms on, fall precautions in place.   "

## 2018-04-18 NOTE — THERAPY
"Occupational Therapy Evaluation completed.   Functional Status:  Mod I w/bed mobility, mod I w/LB dressing, mod I sit>stand walking in room w/fww, no LOB no overt c/o pain or fatigue, able to complete grooming standing w/mod I. Requested BTB d/t being cold. Alarm on call light w/in reach and wife present   Plan of Care: Patient with no further skilled OT needs in the acute care setting at this time  Discharge Recommendations:  Equipment: No Equipment Needed. Post-acute therapy Currently anticipate no further skilled therapy needs once patient is discharged from the inpatient setting.    See \"Rehab Therapy-Acute\" Patient Summary Report for complete documentation.      88 yr old male admitted for weakness, dx w/cholangitis, bacteremia, HTN, enceph w/moments of confusion and low K. Pt appears to be at/near baseline for basic ADL's. Pt has good support from spouse and DME at home. Recommend resuming HH at d/c    "

## 2018-04-18 NOTE — ASSESSMENT & PLAN NOTE
IV Rocephin  ERCP with Balloon common bile duct stones extraction and prior metallic stent stone debris and friable tumor debris balloon extraction.  New uncovered 10mm x 60mm metallic biliary stent placement with good decompression and drainage.

## 2018-04-18 NOTE — DISCHARGE PLANNING
Anticipated DC Disposition: Home    Action: Per chart review and discussion in IDT rounds, pt has no anticipated DC or SS needs.     Barriers to Discharge: None known.    Plan: Pt to DC home when medically stable. LSW remains available for any needs that may arise.

## 2018-04-19 ENCOUNTER — APPOINTMENT (OUTPATIENT)
Dept: RADIOLOGY | Facility: MEDICAL CENTER | Age: 83
DRG: 871 | End: 2018-04-19
Attending: INTERNAL MEDICINE
Payer: MEDICARE

## 2018-04-19 PROBLEM — N39.0 UTI (URINARY TRACT INFECTION): Status: ACTIVE | Noted: 2018-04-19

## 2018-04-19 LAB
ALBUMIN SERPL BCP-MCNC: 2.6 G/DL (ref 3.2–4.9)
ALBUMIN/GLOB SERPL: 1 G/DL
ALP SERPL-CCNC: 318 U/L (ref 30–99)
ALT SERPL-CCNC: 83 U/L (ref 2–50)
ANION GAP SERPL CALC-SCNC: 5 MMOL/L (ref 0–11.9)
AST SERPL-CCNC: 37 U/L (ref 12–45)
BACTERIA BLD CULT: ABNORMAL
BACTERIA BLD CULT: ABNORMAL
BACTERIA UR CULT: ABNORMAL
BACTERIA UR CULT: ABNORMAL
BASOPHILS # BLD AUTO: 0.5 % (ref 0–1.8)
BASOPHILS # BLD: 0.03 K/UL (ref 0–0.12)
BILIRUB SERPL-MCNC: 2.1 MG/DL (ref 0.1–1.5)
BUN SERPL-MCNC: 11 MG/DL (ref 8–22)
CALCIUM SERPL-MCNC: 8 MG/DL (ref 8.5–10.5)
CHLORIDE SERPL-SCNC: 110 MMOL/L (ref 96–112)
CO2 SERPL-SCNC: 22 MMOL/L (ref 20–33)
CREAT SERPL-MCNC: 0.62 MG/DL (ref 0.5–1.4)
EOSINOPHIL # BLD AUTO: 0.17 K/UL (ref 0–0.51)
EOSINOPHIL NFR BLD: 2.8 % (ref 0–6.9)
ERYTHROCYTE [DISTWIDTH] IN BLOOD BY AUTOMATED COUNT: 47.5 FL (ref 35.9–50)
GLOBULIN SER CALC-MCNC: 2.6 G/DL (ref 1.9–3.5)
GLUCOSE SERPL-MCNC: 88 MG/DL (ref 65–99)
HCT VFR BLD AUTO: 30.8 % (ref 42–52)
HGB BLD-MCNC: 10.4 G/DL (ref 14–18)
IMM GRANULOCYTES # BLD AUTO: 0.03 K/UL (ref 0–0.11)
IMM GRANULOCYTES NFR BLD AUTO: 0.5 % (ref 0–0.9)
LYMPHOCYTES # BLD AUTO: 0.67 K/UL (ref 1–4.8)
LYMPHOCYTES NFR BLD: 10.9 % (ref 22–41)
MAGNESIUM SERPL-MCNC: 2 MG/DL (ref 1.5–2.5)
MCH RBC QN AUTO: 30.8 PG (ref 27–33)
MCHC RBC AUTO-ENTMCNC: 33.8 G/DL (ref 33.7–35.3)
MCV RBC AUTO: 91.1 FL (ref 81.4–97.8)
MONOCYTES # BLD AUTO: 0.58 K/UL (ref 0–0.85)
MONOCYTES NFR BLD AUTO: 9.5 % (ref 0–13.4)
NEUTROPHILS # BLD AUTO: 4.65 K/UL (ref 1.82–7.42)
NEUTROPHILS NFR BLD: 75.8 % (ref 44–72)
NRBC # BLD AUTO: 0 K/UL
NRBC BLD-RTO: 0 /100 WBC
PHOSPHATE SERPL-MCNC: 2.6 MG/DL (ref 2.5–4.5)
PLATELET # BLD AUTO: 145 K/UL (ref 164–446)
PMV BLD AUTO: 10.1 FL (ref 9–12.9)
POTASSIUM SERPL-SCNC: 3.2 MMOL/L (ref 3.6–5.5)
PROCALCITONIN SERPL-MCNC: 1.89 NG/ML
PROT SERPL-MCNC: 5.2 G/DL (ref 6–8.2)
RBC # BLD AUTO: 3.38 M/UL (ref 4.7–6.1)
SIGNIFICANT IND 70042: ABNORMAL
SIGNIFICANT IND 70042: ABNORMAL
SITE SITE: ABNORMAL
SITE SITE: ABNORMAL
SODIUM SERPL-SCNC: 137 MMOL/L (ref 135–145)
SOURCE SOURCE: ABNORMAL
SOURCE SOURCE: ABNORMAL
WBC # BLD AUTO: 6.1 K/UL (ref 4.8–10.8)

## 2018-04-19 PROCEDURE — 160009 HCHG ANES TIME/MIN: Performed by: INTERNAL MEDICINE

## 2018-04-19 PROCEDURE — 84145 PROCALCITONIN (PCT): CPT

## 2018-04-19 PROCEDURE — 99233 SBSQ HOSP IP/OBS HIGH 50: CPT | Performed by: FAMILY MEDICINE

## 2018-04-19 PROCEDURE — 0FC98ZZ EXTIRPATION OF MATTER FROM COMMON BILE DUCT, VIA NATURAL OR ARTIFICIAL OPENING ENDOSCOPIC: ICD-10-PCS | Performed by: INTERNAL MEDICINE

## 2018-04-19 PROCEDURE — 87040 BLOOD CULTURE FOR BACTERIA: CPT | Mod: 91

## 2018-04-19 PROCEDURE — C1876 STENT, NON-COA/NON-COV W/DEL: HCPCS | Performed by: INTERNAL MEDICINE

## 2018-04-19 PROCEDURE — 700101 HCHG RX REV CODE 250: Performed by: FAMILY MEDICINE

## 2018-04-19 PROCEDURE — 700111 HCHG RX REV CODE 636 W/ 250 OVERRIDE (IP): Performed by: FAMILY MEDICINE

## 2018-04-19 PROCEDURE — 770020 HCHG ROOM/CARE - TELE (206)

## 2018-04-19 PROCEDURE — BF101ZZ FLUOROSCOPY OF BILE DUCTS USING LOW OSMOLAR CONTRAST: ICD-10-PCS | Performed by: INTERNAL MEDICINE

## 2018-04-19 PROCEDURE — 110371 HCHG SHELL REV 272: Performed by: INTERNAL MEDICINE

## 2018-04-19 PROCEDURE — 500066 HCHG BITE BLOCK, ECT: Performed by: INTERNAL MEDICINE

## 2018-04-19 PROCEDURE — 502240 HCHG MISC OR SUPPLY RC 0272: Performed by: INTERNAL MEDICINE

## 2018-04-19 PROCEDURE — 85025 COMPLETE CBC W/AUTO DIFF WBC: CPT

## 2018-04-19 PROCEDURE — 160203 HCHG ENDO MINUTES - 1ST 30 MINS LEVEL 4: Performed by: INTERNAL MEDICINE

## 2018-04-19 PROCEDURE — 160002 HCHG RECOVERY MINUTES (STAT): Performed by: INTERNAL MEDICINE

## 2018-04-19 PROCEDURE — 80053 COMPREHEN METABOLIC PANEL: CPT

## 2018-04-19 PROCEDURE — 160035 HCHG PACU - 1ST 60 MINS PHASE I: Performed by: INTERNAL MEDICINE

## 2018-04-19 PROCEDURE — 83735 ASSAY OF MAGNESIUM: CPT

## 2018-04-19 PROCEDURE — 36415 COLL VENOUS BLD VENIPUNCTURE: CPT

## 2018-04-19 PROCEDURE — 700111 HCHG RX REV CODE 636 W/ 250 OVERRIDE (IP): Performed by: INTERNAL MEDICINE

## 2018-04-19 PROCEDURE — 0F798DZ DILATION OF COMMON BILE DUCT WITH INTRALUMINAL DEVICE, VIA NATURAL OR ARTIFICIAL OPENING ENDOSCOPIC: ICD-10-PCS | Performed by: INTERNAL MEDICINE

## 2018-04-19 PROCEDURE — 160048 HCHG OR STATISTICAL LEVEL 1-5: Performed by: INTERNAL MEDICINE

## 2018-04-19 PROCEDURE — 84100 ASSAY OF PHOSPHORUS: CPT

## 2018-04-19 PROCEDURE — 700101 HCHG RX REV CODE 250

## 2018-04-19 PROCEDURE — 700111 HCHG RX REV CODE 636 W/ 250 OVERRIDE (IP)

## 2018-04-19 PROCEDURE — 74328 X-RAY BILE DUCT ENDOSCOPY: CPT

## 2018-04-19 PROCEDURE — 700105 HCHG RX REV CODE 258: Performed by: INTERNAL MEDICINE

## 2018-04-19 DEVICE — STENT BILIARY WALLFLEX IARY  UNCOVERED 10X60: Type: IMPLANTABLE DEVICE | Status: FUNCTIONAL

## 2018-04-19 RX ORDER — POTASSIUM CHLORIDE 7.45 MG/ML
10 INJECTION INTRAVENOUS
Status: COMPLETED | OUTPATIENT
Start: 2018-04-19 | End: 2018-04-19

## 2018-04-19 RX ORDER — HYDRALAZINE HYDROCHLORIDE 20 MG/ML
10 INJECTION INTRAMUSCULAR; INTRAVENOUS EVERY 6 HOURS PRN
Status: DISCONTINUED | OUTPATIENT
Start: 2018-04-19 | End: 2018-04-21 | Stop reason: HOSPADM

## 2018-04-19 RX ORDER — POTASSIUM CHLORIDE 20 MEQ/1
20 TABLET, EXTENDED RELEASE ORAL DAILY
Status: DISCONTINUED | OUTPATIENT
Start: 2018-04-19 | End: 2018-04-21

## 2018-04-19 RX ORDER — SODIUM CHLORIDE AND POTASSIUM CHLORIDE 150; 900 MG/100ML; MG/100ML
INJECTION, SOLUTION INTRAVENOUS CONTINUOUS
Status: DISCONTINUED | OUTPATIENT
Start: 2018-04-19 | End: 2018-04-20

## 2018-04-19 RX ORDER — LABETALOL HYDROCHLORIDE 5 MG/ML
10 INJECTION, SOLUTION INTRAVENOUS EVERY 4 HOURS PRN
Status: DISCONTINUED | OUTPATIENT
Start: 2018-04-19 | End: 2018-04-21 | Stop reason: HOSPADM

## 2018-04-19 RX ORDER — ENALAPRILAT 1.25 MG/ML
2.5 INJECTION INTRAVENOUS EVERY 6 HOURS PRN
Status: DISCONTINUED | OUTPATIENT
Start: 2018-04-19 | End: 2018-04-21 | Stop reason: HOSPADM

## 2018-04-19 RX ADMIN — POTASSIUM CHLORIDE 10 MEQ: 7.46 INJECTION, SOLUTION INTRAVENOUS at 10:17

## 2018-04-19 RX ADMIN — PIPERACILLIN SODIUM AND TAZOBACTAM SODIUM 3.38 G: 3; .375 INJECTION, POWDER, FOR SOLUTION INTRAVENOUS at 16:27

## 2018-04-19 RX ADMIN — PIPERACILLIN SODIUM AND TAZOBACTAM SODIUM 3.38 G: 3; .375 INJECTION, POWDER, FOR SOLUTION INTRAVENOUS at 05:17

## 2018-04-19 RX ADMIN — PIPERACILLIN SODIUM AND TAZOBACTAM SODIUM 3.38 G: 3; .375 INJECTION, POWDER, FOR SOLUTION INTRAVENOUS at 21:28

## 2018-04-19 RX ADMIN — LABETALOL HYDROCHLORIDE 10 MG: 5 INJECTION, SOLUTION INTRAVENOUS at 04:13

## 2018-04-19 RX ADMIN — POTASSIUM CHLORIDE 10 MEQ: 7.46 INJECTION, SOLUTION INTRAVENOUS at 11:18

## 2018-04-19 RX ADMIN — POTASSIUM CHLORIDE AND SODIUM CHLORIDE: 900; 150 INJECTION, SOLUTION INTRAVENOUS at 08:12

## 2018-04-19 ASSESSMENT — PAIN SCALES - GENERAL
PAINLEVEL_OUTOF10: 0

## 2018-04-19 ASSESSMENT — ENCOUNTER SYMPTOMS
HEARTBURN: 0
BLURRED VISION: 0
BLOOD IN STOOL: 0
COUGH: 0
DIZZINESS: 0
VOMITING: 0
NERVOUS/ANXIOUS: 0
NAUSEA: 0
WEAKNESS: 1
ABDOMINAL PAIN: 0
WHEEZING: 0
FEVER: 0
CHILLS: 0
SHORTNESS OF BREATH: 0
BACK PAIN: 0
SORE THROAT: 0

## 2018-04-19 NOTE — DISCHARGE PLANNING
Anticipated DC Disposition: Home Health    Action: RN notified LSW that pt's wife had some concerns about pt's safety at home. LSW met with pt's wife and provided various resources ([facility], [facility], [facility], [facility]). Pt's wife stated that she feels good about going home, she just wanted to know what other resources are available. Pt's wife reports that they have great support from family, friends, and neighbors.   Pt's wife requested HH. LSW notified hospitalist RN and requested [facility] and orders. Wife signed choice form for [facility]. LSW will fax to [facility] once orders have been placed.   IMM delivered    Barriers to Discharge: None    Plan: Await HH orders and acceptance.

## 2018-04-19 NOTE — CARE PLAN
Problem: Communication  Goal: The ability to communicate needs accurately and effectively will improve    Intervention: Educate patient and significant other/support system about the plan of care, procedures, treatments, medications and allow for questions  Update pt and wife on POC. Answer questions as needed.      Problem: Safety  Goal: Will remain free from injury  Pt educated on fall precautions. Bed in low, locked position. Call light within reach. Bed alarm on. Treaded socks on pt. Hourly rounding in place.

## 2018-04-19 NOTE — PROGRESS NOTES
Patient seen and examined before going back for ERCP at [facility].  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 Risks, benefits, and alternatives were discussed with patient.  Consenting person(s) were given an opportunity to ask questions and discuss other options.  Risks including but not limited to pancreatitis, contrast reaction, radiation exposure, inadequate drainage, perforation, infection, bleeding, missed lesion(s), cardiac and/or pulmonary event, aspiration, stroke, possible need for surgery and/or interventional radiology, hospitalization possibly prolonged, discomfort, unsuccessful and/or incomplete procedure, indefinite diagnosis, ineffective therapy and/or persistent symptoms, possible need for repeat procedures and/or additional testings, damage to adjacent organs and/or vascular structures, medication reaction, disability, death, and other adverse events possibly life-threatening.  Discussion was undertaken with Layman's terms.  Consenting person stated understanding and acceptance of these risks, and wished to proceed.  Informed consent was given in clear state of mind.

## 2018-04-19 NOTE — PROGRESS NOTES
Pt's wife, June requesting resources for safe d/c with . Notified social Shruthi work. Shruthi social work states she will call pt's wife.

## 2018-04-19 NOTE — PROGRESS NOTES
[facility] Hospitalist Progress Note    Date of Service: 2018    Chief Complaint  88 y.o. male admitted 2018 with Cholangitis, Bacteremia.    Interval Problem Update  Cholangitis - history of biliary stent, exchange done on 2016, for ERCP today  Bacteremia - E.coli  UTI - cult showed Klebsiella  HTN - not controlled  Enceph - periods of confusion  Low K    Consultants/Specialty  GI - Pezanoski    Disposition  TBD        Review of Systems   Constitutional: Positive for malaise/fatigue. Negative for chills and fever.   HENT: Negative for hearing loss and sore throat.    Eyes: Negative for blurred vision.   Respiratory: Negative for cough, shortness of breath and wheezing.    Cardiovascular: Negative for chest pain and leg swelling.   Gastrointestinal: Negative for abdominal pain, blood in stool, heartburn, melena, nausea and vomiting.   Genitourinary: Negative for dysuria.   Musculoskeletal: Negative for back pain.   Neurological: Positive for weakness. Negative for dizziness.   Psychiatric/Behavioral: The patient is not nervous/anxious.       Physical Exam  Laboratory/Imaging   Hemodynamics  Temp (24hrs), Av.9 °C (98.4 °F), Min:36.5 °C (97.7 °F), Max:37.3 °C (99.2 °F)   Temperature: 36.6 °C (97.9 °F)  Pulse  Av.7  Min: 60  Max: 100    Blood Pressure : (!) 176/70 (RN notified)      Respiratory      Respiration: 16, Pulse Oximetry: 94 %     Work Of Breathing / Effort: Mild  RUL Breath Sounds: Clear, RML Breath Sounds: Diminished, RLL Breath Sounds: Diminished, REBECCA Breath Sounds: Clear, LLL Breath Sounds: Diminished    Fluids    Intake/Output Summary (Last 24 hours) at 18 1157  Last data filed at 18 0900   Gross per 24 hour   Intake                0 ml   Output             1725 ml   Net            -1725 ml       Nutrition  Orders Placed This Encounter   Procedures   • DIET NPO     Standing Status:   Standing     Number of Occurrences:   8     Order Specific Question:   Restrict to:      Answer:   Strict [1]     Physical Exam   Constitutional: He appears well-developed.   HENT:   Head: Normocephalic and atraumatic.   Eyes: Conjunctivae are normal. Pupils are equal, round, and reactive to light.   Neck: No tracheal deviation present. No thyromegaly present.   Cardiovascular: Normal rate and regular rhythm.    Pulmonary/Chest: Effort normal and breath sounds normal.   Abdominal: Soft. Bowel sounds are normal. He exhibits no distension. There is no tenderness. There is no rebound and no guarding.   Lymphadenopathy:     He has no cervical adenopathy.   Neurological: He is alert.   Oriented to person only   Skin: Skin is warm and dry.   Nursing note and vitals reviewed.      Recent Labs      04/17/18   0831  04/17/18 2339  04/19/18   0408   WBC  13.2*  7.6  6.1   RBC  3.40*  3.41*  3.38*   HEMOGLOBIN  10.3*  10.4*  10.4*   HEMATOCRIT  30.6*  30.9*  30.8*   MCV  90.0  90.6  91.1   MCH  30.3  30.5  30.8   MCHC  33.7  33.7  33.8   RDW  48.1  48.1  47.5   PLATELETCT  154*  148*  145*   MPV  9.9  9.7  10.1     Recent Labs      04/17/18   0831  04/17/18 2339  04/19/18   0408   SODIUM  139  139  137   POTASSIUM  3.9  3.4*  3.2*   CHLORIDE  113*  110  110   CO2  19*  21  22   GLUCOSE  113*  107*  88   BUN  27*  18  11   CREATININE  0.80  0.69  0.62   CALCIUM  8.0*  8.3*  8.0*     Recent Labs      04/17/18   0451   APTT  28.0   INR  1.39*                  Assessment/Plan     * Cholangitis- (present on admission)   Assessment & Plan    IV Zosyn  For ERCP today        Encephalopathy- (present on admission)   Assessment & Plan    Multifactorial  frequent orientation, avoid bzd and anticholinergics        Gram-negative bacteremia- (present on admission)   Assessment & Plan    IV Zosyn        Sepsis (CMS-HCC)- (present on admission)   Assessment & Plan    This is sepsis (without associated acute organ dysfunction).   Source - likely cholangitis            UTI (urinary tract infection)- (present on admission)    Assessment & Plan    IV Zosyn        Hypokalemia   Assessment & Plan    IV and oral KCl, follow bmp        Transaminitis- (present on admission)   Assessment & Plan    Chronic elevation  Follow cmp        Coronary artery disease involving native heart without angina pectoris- (present on admission)   Assessment & Plan    Aspirin, Plavix   Hold Lipitor        Essential hypertension- (present on admission)   Assessment & Plan    Losartan and Metoprolol  IV Vasotec, Labetalol and Hydralazine prn        Weakness- (present on admission)   Assessment & Plan    Likely secondary to sepsis            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

## 2018-04-20 ENCOUNTER — HOME HEALTH ADMISSION (OUTPATIENT)
Dept: HOME HEALTH SERVICES | Facility: HOME HEALTHCARE | Age: 83
End: 2018-04-20
Payer: MEDICARE

## 2018-04-20 LAB
ALBUMIN SERPL BCP-MCNC: 2.7 G/DL (ref 3.2–4.9)
ALBUMIN/GLOB SERPL: 1.1 G/DL
ALP SERPL-CCNC: 319 U/L (ref 30–99)
ALT SERPL-CCNC: 65 U/L (ref 2–50)
ANION GAP SERPL CALC-SCNC: 4 MMOL/L (ref 0–11.9)
AST SERPL-CCNC: 23 U/L (ref 12–45)
BASOPHILS # BLD AUTO: 0.3 % (ref 0–1.8)
BASOPHILS # BLD: 0.02 K/UL (ref 0–0.12)
BILIRUB SERPL-MCNC: 1.2 MG/DL (ref 0.1–1.5)
BUN SERPL-MCNC: 18 MG/DL (ref 8–22)
CALCIUM SERPL-MCNC: 8.3 MG/DL (ref 8.5–10.5)
CHLORIDE SERPL-SCNC: 111 MMOL/L (ref 96–112)
CO2 SERPL-SCNC: 24 MMOL/L (ref 20–33)
CREAT SERPL-MCNC: 0.75 MG/DL (ref 0.5–1.4)
EOSINOPHIL # BLD AUTO: 0.23 K/UL (ref 0–0.51)
EOSINOPHIL NFR BLD: 3.5 % (ref 0–6.9)
ERYTHROCYTE [DISTWIDTH] IN BLOOD BY AUTOMATED COUNT: 48.2 FL (ref 35.9–50)
GLOBULIN SER CALC-MCNC: 2.4 G/DL (ref 1.9–3.5)
GLUCOSE SERPL-MCNC: 113 MG/DL (ref 65–99)
HCT VFR BLD AUTO: 30.2 % (ref 42–52)
HGB BLD-MCNC: 10.2 G/DL (ref 14–18)
IMM GRANULOCYTES # BLD AUTO: 0.04 K/UL (ref 0–0.11)
IMM GRANULOCYTES NFR BLD AUTO: 0.6 % (ref 0–0.9)
LYMPHOCYTES # BLD AUTO: 0.73 K/UL (ref 1–4.8)
LYMPHOCYTES NFR BLD: 11.1 % (ref 22–41)
MCH RBC QN AUTO: 31 PG (ref 27–33)
MCHC RBC AUTO-ENTMCNC: 33.8 G/DL (ref 33.7–35.3)
MCV RBC AUTO: 91.8 FL (ref 81.4–97.8)
MONOCYTES # BLD AUTO: 0.8 K/UL (ref 0–0.85)
MONOCYTES NFR BLD AUTO: 12.1 % (ref 0–13.4)
NEUTROPHILS # BLD AUTO: 4.77 K/UL (ref 1.82–7.42)
NEUTROPHILS NFR BLD: 72.4 % (ref 44–72)
NRBC # BLD AUTO: 0 K/UL
NRBC BLD-RTO: 0 /100 WBC
PLATELET # BLD AUTO: 149 K/UL (ref 164–446)
PMV BLD AUTO: 9.9 FL (ref 9–12.9)
POTASSIUM SERPL-SCNC: 3.9 MMOL/L (ref 3.6–5.5)
PROT SERPL-MCNC: 5.1 G/DL (ref 6–8.2)
RBC # BLD AUTO: 3.29 M/UL (ref 4.7–6.1)
SODIUM SERPL-SCNC: 139 MMOL/L (ref 135–145)
WBC # BLD AUTO: 6.6 K/UL (ref 4.8–10.8)

## 2018-04-20 PROCEDURE — 700102 HCHG RX REV CODE 250 W/ 637 OVERRIDE(OP): Performed by: FAMILY MEDICINE

## 2018-04-20 PROCEDURE — 700105 HCHG RX REV CODE 258: Performed by: INTERNAL MEDICINE

## 2018-04-20 PROCEDURE — 700111 HCHG RX REV CODE 636 W/ 250 OVERRIDE (IP): Performed by: INTERNAL MEDICINE

## 2018-04-20 PROCEDURE — A9270 NON-COVERED ITEM OR SERVICE: HCPCS | Performed by: INTERNAL MEDICINE

## 2018-04-20 PROCEDURE — 85025 COMPLETE CBC W/AUTO DIFF WBC: CPT

## 2018-04-20 PROCEDURE — 700102 HCHG RX REV CODE 250 W/ 637 OVERRIDE(OP): Performed by: INTERNAL MEDICINE

## 2018-04-20 PROCEDURE — 80053 COMPREHEN METABOLIC PANEL: CPT

## 2018-04-20 PROCEDURE — 700111 HCHG RX REV CODE 636 W/ 250 OVERRIDE (IP): Performed by: FAMILY MEDICINE

## 2018-04-20 PROCEDURE — 770020 HCHG ROOM/CARE - TELE (206)

## 2018-04-20 PROCEDURE — A9270 NON-COVERED ITEM OR SERVICE: HCPCS | Performed by: FAMILY MEDICINE

## 2018-04-20 PROCEDURE — 99233 SBSQ HOSP IP/OBS HIGH 50: CPT | Performed by: FAMILY MEDICINE

## 2018-04-20 PROCEDURE — 36415 COLL VENOUS BLD VENIPUNCTURE: CPT

## 2018-04-20 PROCEDURE — 700101 HCHG RX REV CODE 250: Performed by: FAMILY MEDICINE

## 2018-04-20 RX ORDER — LOSARTAN POTASSIUM 50 MG/1
50 TABLET ORAL DAILY
Status: DISCONTINUED | OUTPATIENT
Start: 2018-04-20 | End: 2018-04-21 | Stop reason: HOSPADM

## 2018-04-20 RX ADMIN — ENALAPRILAT 2.5 MG: 1.25 INJECTION INTRAVENOUS at 16:54

## 2018-04-20 RX ADMIN — POTASSIUM CHLORIDE AND SODIUM CHLORIDE: 900; 150 INJECTION, SOLUTION INTRAVENOUS at 12:08

## 2018-04-20 RX ADMIN — METOPROLOL SUCCINATE 12.5 MG: 25 TABLET, EXTENDED RELEASE ORAL at 09:48

## 2018-04-20 RX ADMIN — ENALAPRILAT 2.5 MG: 1.25 INJECTION INTRAVENOUS at 23:59

## 2018-04-20 RX ADMIN — ASPIRIN 81 MG: 81 TABLET, COATED ORAL at 09:47

## 2018-04-20 RX ADMIN — POTASSIUM CHLORIDE 20 MEQ: 1500 TABLET, EXTENDED RELEASE ORAL at 09:48

## 2018-04-20 RX ADMIN — CEFTRIAXONE 2 G: 2 INJECTION, POWDER, FOR SOLUTION INTRAMUSCULAR; INTRAVENOUS at 10:43

## 2018-04-20 RX ADMIN — LOSARTAN POTASSIUM 50 MG: 50 TABLET, FILM COATED ORAL at 09:48

## 2018-04-20 RX ADMIN — CLOPIDOGREL 75 MG: 75 TABLET, FILM COATED ORAL at 09:47

## 2018-04-20 RX ADMIN — PIPERACILLIN SODIUM AND TAZOBACTAM SODIUM 3.38 G: 3; .375 INJECTION, POWDER, FOR SOLUTION INTRAVENOUS at 07:15

## 2018-04-20 ASSESSMENT — ENCOUNTER SYMPTOMS
DIZZINESS: 0
WHEEZING: 0
SORE THROAT: 0
ABDOMINAL PAIN: 0
BACK PAIN: 0
FEVER: 0
SHORTNESS OF BREATH: 0
BLURRED VISION: 0
COUGH: 0
NERVOUS/ANXIOUS: 0
NAUSEA: 0
VOMITING: 0
CHILLS: 0
WEAKNESS: 0
BLOOD IN STOOL: 0
HEARTBURN: 0

## 2018-04-20 ASSESSMENT — PAIN SCALES - GENERAL: PAINLEVEL_OUTOF10: 0

## 2018-04-20 NOTE — CARE PLAN
Problem: Communication  Goal: The ability to communicate needs accurately and effectively will improve    Intervention: Educate patient and significant other/support system about the plan of care, procedures, treatments, medications and allow for questions  Update pt and wife on POC. Answer questions as needed.      Problem: Safety  Goal: Will remain free from injury    Intervention: Educate patient and significant other/support system about adaptive mobility strategies and safe transfers  Pt educated on fall precautions. Bed in low, locked position. Call light within reach. Bed alarm on. Treaded socks on pt. Hourly rounding in place.

## 2018-04-20 NOTE — CARE PLAN
Problem: Safety  Goal: Will remain free from injury    Intervention: Provide assistance with mobility  Ambulated pt in halls x 1 assist and fww. Contact guard necissary pt still unsteady at times after procedure.      Problem: Discharge Barriers/Planning  Goal: Patient's continuum of care needs will be met    Intervention: Involve patient and significant other/support system in setting and prioritizing goals for hospital stay and discharge  Pt educated to the indication of clear liquid diet s/p procedure. Diet advanced as tolerated. Pt verbalized understanding.

## 2018-04-20 NOTE — PROGRESS NOTES
Report received by day shift RN. Pt sitting up in bed awake alert and oriented x 4 with no c/o pain. Pt updated to POC and verbalized understanding. Bed locked in low position with fall precautions in place and call light in reach.

## 2018-04-20 NOTE — DISCHARGE PLANNING
Anticipated DC Disposition: Home Health    Action: LSW received HH order. Choice was obtained from pt's wife yesterday; LSW faxed choice form for [facility] to CHRISTOPHER Morrow.     Barriers to Discharge: None.     Plan: Pt to DC home when HH has accepted, pending medical clearance.

## 2018-04-20 NOTE — CARE PLAN
Problem: Safety  Goal: Will remain free from falls    Intervention: Implement fall precautions  Call light in reach, hourly rounding in practice.       Problem: Mobility  Goal: Risk for activity intolerance will decrease    Intervention: Encourage patient to increase activity level in collaboration with Interdisciplinary Team  Pt will walk in the halls today.

## 2018-04-20 NOTE — DISCHARGE PLANNING
ATTN: Case Management  RE: Referral for Home Health                We would like to take this opportunity to thank you for submitting a referral for your patient to continue care with [facility]. Our skilled team is dedicated to helping all patients recover and gain independence in the home setting.            As of 4/20/18, we have accepted the above patient into our service. A [facility] clinician will be out to see the patient within 48 hours to conduct our initial visit. If you have any questions or concerns regarding the patient’s transition to Home Health, please do not hesitate to contact us. We are open for referrals 7 days a week from 8AM to 5PM at [phone].      We look forward to collaborating with you,  [facility] Team

## 2018-04-20 NOTE — DISCHARGE PLANNING
Received choice form from ANTWAN Hawkins.  Referral sent to [facility] at [id number] on 04-20-18

## 2018-04-20 NOTE — PROGRESS NOTES
[facility] Hospitalist Progress Note    Date of Service: 2018    Chief Complaint  88 y.o. male admitted 2018 with Cholangitis, Bacteremia.    Interval Problem Update  Cholangitis - ERCP done yesterday  Bacteremia - E.coli  UTI - cult showed Klebsiella  HTN - not controlled  Enceph - less periods of confusion    Consultants/Specialty  GI - Rosalva signed off    Disposition  TBD        Review of Systems   Constitutional: Negative for chills, fever and malaise/fatigue.   HENT: Negative for hearing loss and sore throat.    Eyes: Negative for blurred vision.   Respiratory: Negative for cough, shortness of breath and wheezing.    Cardiovascular: Negative for chest pain and leg swelling.   Gastrointestinal: Negative for abdominal pain, blood in stool, heartburn, melena, nausea and vomiting.   Genitourinary: Negative for dysuria.   Musculoskeletal: Negative for back pain.   Neurological: Negative for dizziness and weakness.   Psychiatric/Behavioral: The patient is not nervous/anxious.       Physical Exam  Laboratory/Imaging   Hemodynamics  Temp (24hrs), Av.1 °C (98.8 °F), Min:36.5 °C (97.7 °F), Max:37.6 °C (99.6 °F)   Temperature: 36.7 °C (98 °F)  Pulse  Av.5  Min: 56  Max: 100 Heart Rate (Monitored): 61  Blood Pressure : 160/70 (Nurse notified.), NIBP: 140/70      Respiratory      Respiration: 18, Pulse Oximetry: 94 %     Work Of Breathing / Effort: Mild  RUL Breath Sounds: Clear, RML Breath Sounds: Clear, RLL Breath Sounds: Diminished, REBECCA Breath Sounds: Clear, LLL Breath Sounds: Diminished    Fluids    Intake/Output Summary (Last 24 hours) at 18 1350  Last data filed at 18 0400   Gross per 24 hour   Intake              340 ml   Output              600 ml   Net             -260 ml       Nutrition  Orders Placed This Encounter   Procedures   • DIET ORDER     Standing Status:   Standing     Number of Occurrences:   1     Order Specific Question:   Diet:     Answer:   Regular [1]     Physical Exam    Constitutional: He appears well-developed.   HENT:   Head: Normocephalic and atraumatic.   Eyes: Conjunctivae are normal. Pupils are equal, round, and reactive to light.   Neck: No tracheal deviation present. No thyromegaly present.   Cardiovascular: Normal rate and regular rhythm.    Pulmonary/Chest: Effort normal and breath sounds normal.   Abdominal: Soft. Bowel sounds are normal. He exhibits no distension. There is no tenderness. There is no rebound and no guarding.   Lymphadenopathy:     He has no cervical adenopathy.   Neurological: He is alert.   Oriented to person only   Skin: Skin is warm and dry.   Nursing note and vitals reviewed.      Recent Labs      04/17/18 2339  04/19/18   0408  04/20/18   0312   WBC  7.6  6.1  6.6   RBC  3.41*  3.38*  3.29*   HEMOGLOBIN  10.4*  10.4*  10.2*   HEMATOCRIT  30.9*  30.8*  30.2*   MCV  90.6  91.1  91.8   MCH  30.5  30.8  31.0   MCHC  33.7  33.8  33.8   RDW  48.1  47.5  48.2   PLATELETCT  148*  145*  149*   MPV  9.7  10.1  9.9     Recent Labs      04/17/18   2339  04/19/18 0408  04/20/18   0312   SODIUM  139  137  139   POTASSIUM  3.4*  3.2*  3.9   CHLORIDE  110  110  111   CO2  21  22  24   GLUCOSE  107*  88  113*   BUN  18  11  18   CREATININE  0.69  0.62  0.75   CALCIUM  8.3*  8.0*  8.3*                      Assessment/Plan     * Cholangitis- (present on admission)   Assessment & Plan    IV Rocephin  ERCP with Balloon common bile duct stones extraction and prior metallic stent stone debris and friable tumor debris balloon extraction.  New uncovered 10mm x 60mm metallic biliary stent placement with good decompression and drainage.        Encephalopathy- (present on admission)   Assessment & Plan    Multifactorial  frequent orientation, avoid bzd and anticholinergics        Gram-negative bacteremia- (present on admission)   Assessment & Plan    Change to IV Rocephin        Sepsis (CMS-HCC)- (present on admission)   Assessment & Plan    This is sepsis (without  associated acute organ dysfunction).   Source - likely cholangitis            UTI (urinary tract infection)- (present on admission)   Assessment & Plan    IV Rocephin        Hypokalemia   Assessment & Plan    oral KCl, follow bmp        Transaminitis- (present on admission)   Assessment & Plan    Chronic elevation  Follow cmp        Coronary artery disease involving native heart without angina pectoris- (present on admission)   Assessment & Plan    Aspirin, Plavix   Hold Lipitor        Essential hypertension- (present on admission)   Assessment & Plan    Increase Losartan to 50 mg and continue Metoprolol  IV Vasotec, Labetalol and Hydralazine prn        Weakness- (present on admission)   Assessment & Plan    Likely secondary to sepsis            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

## 2018-04-20 NOTE — FACE TO FACE
Face to Face Supporting Documentation - Home Health    The encounter with this patient was in whole or in part the primary reason for home health admission.    Date of encounter:   Patient:                    MRN:                       YOB: 2018  Donato Burciaga  [id number]  5/27/1929     Home health to see patient for:  Skilled Nursing care for assessment, interventions & education    Skilled need for:  New Onset Medical Diagnosis Cholangitis    Skilled nursing interventions to include:  Comment: PT/OT    Homebound status evidenced by:  Needs the assistance of another person in order to leave the home. Leaving home requires a considerable and taxing effort. There is a normal inability to leave the home.    Community Physician to provide follow up care: Fabiano Burk M.D.     Optional Interventions? No      I certify the face to face encounter for this home health care referral meets the CMS requirements and the encounter/clinical assessment with the patient was, in whole, or in part, for the medical condition(s) listed above, which is the primary reason for home health care. Based on my clinical findings: the service(s) are medically necessary, support the need for home health care, and the homebound criteria are met.  I certify that this patient has had a face to face encounter by myself.  Terry Easton M.D. - NPI: [id number]

## 2018-04-21 VITALS
DIASTOLIC BLOOD PRESSURE: 74 MMHG | BODY MASS INDEX: 20.76 KG/M2 | OXYGEN SATURATION: 93 % | HEART RATE: 60 BPM | TEMPERATURE: 99 F | WEIGHT: 132.28 LBS | SYSTOLIC BLOOD PRESSURE: 147 MMHG | HEIGHT: 67 IN | RESPIRATION RATE: 17 BRPM

## 2018-04-21 LAB
ALBUMIN SERPL BCP-MCNC: 2.8 G/DL (ref 3.2–4.9)
ALBUMIN/GLOB SERPL: 1.1 G/DL
ALP SERPL-CCNC: 279 U/L (ref 30–99)
ALT SERPL-CCNC: 49 U/L (ref 2–50)
ANION GAP SERPL CALC-SCNC: 5 MMOL/L (ref 0–11.9)
AST SERPL-CCNC: 20 U/L (ref 12–45)
BASOPHILS # BLD AUTO: 0.5 % (ref 0–1.8)
BASOPHILS # BLD: 0.03 K/UL (ref 0–0.12)
BILIRUB SERPL-MCNC: 0.8 MG/DL (ref 0.1–1.5)
BUN SERPL-MCNC: 19 MG/DL (ref 8–22)
CALCIUM SERPL-MCNC: 8.5 MG/DL (ref 8.5–10.5)
CHLORIDE SERPL-SCNC: 109 MMOL/L (ref 96–112)
CO2 SERPL-SCNC: 22 MMOL/L (ref 20–33)
CREAT SERPL-MCNC: 0.63 MG/DL (ref 0.5–1.4)
EOSINOPHIL # BLD AUTO: 0.28 K/UL (ref 0–0.51)
EOSINOPHIL NFR BLD: 4.2 % (ref 0–6.9)
ERYTHROCYTE [DISTWIDTH] IN BLOOD BY AUTOMATED COUNT: 48.1 FL (ref 35.9–50)
GLOBULIN SER CALC-MCNC: 2.5 G/DL (ref 1.9–3.5)
GLUCOSE SERPL-MCNC: 124 MG/DL (ref 65–99)
HCT VFR BLD AUTO: 30.5 % (ref 42–52)
HGB BLD-MCNC: 10.2 G/DL (ref 14–18)
IMM GRANULOCYTES # BLD AUTO: 0.02 K/UL (ref 0–0.11)
IMM GRANULOCYTES NFR BLD AUTO: 0.3 % (ref 0–0.9)
LYMPHOCYTES # BLD AUTO: 0.75 K/UL (ref 1–4.8)
LYMPHOCYTES NFR BLD: 11.3 % (ref 22–41)
MCH RBC QN AUTO: 30.7 PG (ref 27–33)
MCHC RBC AUTO-ENTMCNC: 33.4 G/DL (ref 33.7–35.3)
MCV RBC AUTO: 91.9 FL (ref 81.4–97.8)
MONOCYTES # BLD AUTO: 0.77 K/UL (ref 0–0.85)
MONOCYTES NFR BLD AUTO: 11.6 % (ref 0–13.4)
NEUTROPHILS # BLD AUTO: 4.77 K/UL (ref 1.82–7.42)
NEUTROPHILS NFR BLD: 72.1 % (ref 44–72)
NRBC # BLD AUTO: 0 K/UL
NRBC BLD-RTO: 0 /100 WBC
PHOSPHATE SERPL-MCNC: 2.2 MG/DL (ref 2.5–4.5)
PLATELET # BLD AUTO: 156 K/UL (ref 164–446)
PMV BLD AUTO: 9.7 FL (ref 9–12.9)
POTASSIUM SERPL-SCNC: 3.9 MMOL/L (ref 3.6–5.5)
PROT SERPL-MCNC: 5.3 G/DL (ref 6–8.2)
RBC # BLD AUTO: 3.32 M/UL (ref 4.7–6.1)
SODIUM SERPL-SCNC: 136 MMOL/L (ref 135–145)
WBC # BLD AUTO: 6.6 K/UL (ref 4.8–10.8)

## 2018-04-21 PROCEDURE — 85025 COMPLETE CBC W/AUTO DIFF WBC: CPT

## 2018-04-21 PROCEDURE — 700102 HCHG RX REV CODE 250 W/ 637 OVERRIDE(OP): Performed by: FAMILY MEDICINE

## 2018-04-21 PROCEDURE — 700102 HCHG RX REV CODE 250 W/ 637 OVERRIDE(OP): Performed by: INTERNAL MEDICINE

## 2018-04-21 PROCEDURE — A9270 NON-COVERED ITEM OR SERVICE: HCPCS | Performed by: INTERNAL MEDICINE

## 2018-04-21 PROCEDURE — 80053 COMPREHEN METABOLIC PANEL: CPT

## 2018-04-21 PROCEDURE — A9270 NON-COVERED ITEM OR SERVICE: HCPCS | Performed by: FAMILY MEDICINE

## 2018-04-21 PROCEDURE — 36415 COLL VENOUS BLD VENIPUNCTURE: CPT

## 2018-04-21 PROCEDURE — 700111 HCHG RX REV CODE 636 W/ 250 OVERRIDE (IP): Performed by: FAMILY MEDICINE

## 2018-04-21 PROCEDURE — 99239 HOSP IP/OBS DSCHRG MGMT >30: CPT | Performed by: FAMILY MEDICINE

## 2018-04-21 PROCEDURE — 84100 ASSAY OF PHOSPHORUS: CPT

## 2018-04-21 RX ORDER — CEFDINIR 300 MG/1
300 CAPSULE ORAL 2 TIMES DAILY
Qty: 14 CAP | Refills: 0 | Status: SHIPPED | OUTPATIENT
Start: 2018-04-22 | End: 2018-04-29

## 2018-04-21 RX ORDER — LOSARTAN POTASSIUM 50 MG/1
50 TABLET ORAL DAILY
Qty: 30 TAB | Refills: 1 | Status: SHIPPED | OUTPATIENT
Start: 2018-04-22 | End: 2018-05-30

## 2018-04-21 RX ADMIN — METOPROLOL SUCCINATE 12.5 MG: 25 TABLET, EXTENDED RELEASE ORAL at 09:58

## 2018-04-21 RX ADMIN — DIBASIC SODIUM PHOSPHATE, MONOBASIC POTASSIUM PHOSPHATE AND MONOBASIC SODIUM PHOSPHATE 1 TABLET: 852; 155; 130 TABLET ORAL at 09:56

## 2018-04-21 RX ADMIN — LOSARTAN POTASSIUM 50 MG: 50 TABLET, FILM COATED ORAL at 09:56

## 2018-04-21 RX ADMIN — CEFTRIAXONE 2 G: 2 INJECTION, POWDER, FOR SOLUTION INTRAMUSCULAR; INTRAVENOUS at 09:54

## 2018-04-21 RX ADMIN — ASPIRIN 81 MG: 81 TABLET, COATED ORAL at 09:56

## 2018-04-21 RX ADMIN — CLOPIDOGREL 75 MG: 75 TABLET, FILM COATED ORAL at 09:55

## 2018-04-21 ASSESSMENT — PAIN SCALES - GENERAL
PAINLEVEL_OUTOF10: 0
PAINLEVEL_OUTOF10: 0

## 2018-04-21 NOTE — DISCHARGE INSTRUCTIONS
Discharge Instructions    Discharged to home by taxi with relative. Discharged via wheelchair, hospital escort: Yes.  Special equipment needed: Not Applicable    Be sure to schedule a follow-up appointment with your primary care doctor or any specialists as instructed.     Discharge Plan:   Diet Plan: Discussed  Activity Level: Discussed  Confirmed Follow up Appointment: Appointment Scheduled  Confirmed Symptoms Management: Discussed  Medication Reconciliation Updated: Yes  Influenza Vaccine Indication: Not indicated: Previously immunized this influenza season and > 8 years of age    I understand that a diet low in cholesterol, fat, and sodium is recommended for good health. Unless I have been given specific instructions below for another diet, I accept this instruction as my diet prescription.   Other diet: regular, low sodium    Special Instructions: Sepsis, Adult  Sepsis is a serious infection of your blood or tissues that affects your whole body. The infection that causes sepsis may be bacterial, viral, fungal, or parasitic. Sepsis may be life threatening. Sepsis can cause your blood pressure to drop. This may result in shock. Shock causes your central nervous system and your organs to stop working correctly.  What increases the risk?  Sepsis can happen in anyone, but it is more likely to happen in people who have weakened immune systems.  What are the signs or symptoms?  Symptoms of sepsis can include:  · Fever or low body temperature (hypothermia).  · Rapid breathing (hyperventilation).  · Chills.  · Rapid heartbeat (tachycardia).  · Confusion or light-headedness.  · Trouble breathing.  · Urinating much less than usual.  · Cool, clammy skin or red, flushed skin.  · Other problems with the heart, kidneys, or brain.  How is this diagnosed?  Your health care provider will likely do tests to look for an infection, to see if the infection has spread to your blood, and to see how serious your condition is. Tests can  include:  · Blood tests, including cultures of your blood.  · Cultures of other fluids from your body, such as:  ¨ Urine.  ¨ Pus from wounds.  ¨ Mucus coughed up from your lungs.  · Urine tests other than cultures.  · X-ray exams or other imaging tests.  How is this treated?  Treatment will begin with elimination of the source of infection. If your sepsis is likely caused by a bacterial or fungal infection, you will be given antibiotic or antifungal medicines.  You may also receive:  · Oxygen.  · Fluids through an IV tube.  · Medicines to increase your blood pressure.  · A machine to clean your blood (dialysis) if your kidneys fail.  · A machine to help you breathe if your lungs fail.  Get help right away if:  You get an infection or develop any of the signs and symptoms of sepsis after surgery or a hospitalization.  This information is not intended to replace advice given to you by your health care provider. Make sure you discuss any questions you have with your health care provider.  Document Released: 09/15/2004 Document Revised: 05/25/2017 Document Reviewed: 08/25/2014  [employer] Interactive Patient Education © 2017 [employer]      · Is patient discharged on Warfarin / Coumadin?   No             Urinary Tract Infection, Adult  Introduction  A urinary tract infection (UTI) is an infection of any part of the urinary tract. The urinary tract includes the:  · Kidneys.  · Ureters.  · Bladder.  · Urethra.  These organs make, store, and get rid of pee (urine) in the body.  Follow these instructions at home:  · Take over-the-counter and prescription medicines only as told by your doctor.  · If you were prescribed an antibiotic medicine, take it as told by your doctor. Do not stop taking the antibiotic even if you start to feel better.  · Avoid the following drinks:  ¨ Alcohol.  ¨ Caffeine.  ¨ Tea.  ¨ Carbonated drinks.  · Drink enough fluid to keep your pee clear or pale yellow.  · Keep all follow-up visits as told by  your doctor. This is important.  · Make sure to:  ¨ Empty your bladder often and completely. Do not to hold pee for long periods of time.  ¨ Empty your bladder before and after sex.  ¨ Wipe from front to back after a bowel movement if you are female. Use each tissue one time when you wipe.  Contact a doctor if:  · You have back pain.  · You have a fever.  · You feel sick to your stomach (nauseous).  · You throw up (vomit).  · Your symptoms do not get better after 3 days.  · Your symptoms go away and then come back.  Get help right away if:  · You have very bad back pain.  · You have very bad lower belly (abdominal) pain.  · You are throwing up and cannot keep down any medicines or water.  This information is not intended to replace advice given to you by your health care provider. Make sure you discuss any questions you have with your health care provider.  Document Released: 06/05/2009 Document Revised: 05/25/2017 Document Reviewed: 11/07/2016  © 2017 [employer]      Cholangitis  Introduction  Cholangitis is inflammation of the group of tubes (ducts) that carry digestive juices from the liver, gallbladder, and pancreas to the small intestine. This group of ducts is called the biliary tract. Cholangitis can cause fever, abdominal pain, and yellowish discoloration of the skin, the whites of the eyes, and mucous membranes (jaundice).  Cholangitis can get worse very quickly and cause infection throughout the body (sepsis). It is important to diagnose and treat cholangitis as soon as possible.  What are the causes?  This condition is usually caused by a blockage (obstruction) in the biliary tract. The most common causes of obstruction are:  · Formation of hard particles (stones) in the biliary tract.  · Damage to the biliary tract from a previous surgical or diagnostic procedure.  Other causes of an obstruction include:  · Cysts or tumors in the biliary tract.  · A type of liver disease that affects the biliary tract  (primary sclerosing cholangitis).  · Being born with a narrow biliary tract.  When the flow of digestive juices is blocked, bacteria that normally live in the intestine can grow and spread inside the biliary tract.  What increases the risk?  The following factors may make you more likely to develop this condition:  · Being 50?60 years old.  · Having a history of stones in the biliary tract.  · Having had cholangitis in the past.  · Having HIV.  · Having another condition that affects the biliary tract.  · Having had a procedure to diagnose or treat problems with the biliary tract, especially endoscopic retrograde cholangiopancreatography (ERCP). These types of procedures may cause scarring and obstruction that can lead to infection.  What are the signs or symptoms?  The most common symptoms of this condition are fever, abdominal pain, and jaundice. Most of the time, all of these symptoms are present. Other symptoms may include:  · Chills.  · Tiredness.  · Nausea.  · Dark-colored urine.  · Karel-colored stools.  · Confusion.  · Itchy skin.  How is this diagnosed?  This condition may be diagnosed based on:  · Your symptoms.  · A physical exam.  · Your medical history. Your health care provider may ask whether you have had stones, ERCP, or other procedures involving the biliary tract in the past.  · Blood tests.  · Imaging studies, such as:  ¨ An ultrasound. This uses sound waves to make an image of any obstructions that you have.  ¨ An MRI.  ¨ A CT scan.  · ERCP to check the biliary tract for possible causes of cholangitis. During ERCP, a thin, lighted tube (endoscope) is passed through your mouth and down your throat into the first part of your small intestine (duodenum). A small, plastic tube (cannula) is then passed through the endoscope and directed into your bile duct or pancreatic duct. Dye is then injected through the cannula and X-rays are taken.  How is this treated?     This condition is usually treated at a  hospital. Treatment may include:  · Receiving fluids, nutrition, and antibiotic medicines through an IV tube. You may be given antibiotics that kill most of the bacteria known to cause cholangitis (broad spectrum antibiotics).  · ERCP or another surgical procedure to open and drain the biliary tract.  Follow these instructions at home:  Medicines  · Take over-the-counter and prescription medicines only as told by your health care provider.  · Take your antibiotic medicine as told by your health care provider. Do not stop taking the antibiotic even if you start to feel better.  General instructions  · Return to your normal activities as told by your health care provider. Ask your health care provider what activities are safe for you.  · Follow instructions from your health care provider about eating or drinking restrictions.  · Maintain a healthy weight.  · Exercise regularly, as told by your health care provider.  · Keep all follow-up visits as told by your health care provider. This is important.  Contact a health care provider if:  · You have a fever.  · Your symptoms return or become more severe.  · You suddenly lose weight.  This information is not intended to replace advice given to you by your health care provider. Make sure you discuss any questions you have with your health care provider.  Document Released: 01/13/2017 Document Revised: 05/25/2017 Document Reviewed: 01/06/2016  © 2017 [employer]          Antibiotic Medicine  Antibiotic medicines are used to treat infections caused by bacteria. They work by injuring or killing the bacteria that is making you sick.  HOW IS AN ANTIBIOTIC CHOSEN?  An antibiotic is chosen based on many factors. To help your health care provider choose one for you, tell your health care provider if:  · You have any allergies.  · You are pregnant or plan to get pregnant.  · You are breastfeeding.  · You are taking any medicines. These include over-the-counter medicines, prescription  medicines, and herbal remedies.  · You have a medical condition or problem you have not already discussed.  Your health care provider will also consider:  · How often the medicine has to be taken.  · Common side effects of the medicine.  · The cost of the medicine.  · The taste of the medicine.  If you have questions about why an antibiotic was chosen, make sure to ask.  FOR HOW LONG SHOULD I TAKE MY ANTIBIOTIC?  Continue to take your antibiotic for as long as told by your health care provider. Do not stop taking it when you feel better. If you stop taking it too soon:  · You may start to feel sick again.  · Your infection may become harder to treat.  · Complications may develop.  WHAT IF I MISS A DOSE?  Try not to miss any doses of medicine. If you miss a dose, take it as soon as possible. However, if it is almost time for the next dose:  · If you are taking 2 doses per day, take the missed dose and the next dose 5 to 6 hours apart.  · If you are taking 3 or more doses per day, take the missed dose and the next dose 2 to 4 hours apart, then go back to the normal schedule.  If you cannot make up a missed dose, take the next scheduled dose on time. Then take the missed dose after you have taken all the doses as recommended by your health care provider, as if you had one more dose left.  DO ANTIBIOTICS AFFECT BIRTH CONTROL?  Birth control pills may not work while you are on antibiotics. If you are taking birth control pills, continue taking them as usual and use a second form of birth control, such as a condom, to avoid unwanted pregnancy. Continue using the second form of birth control until you are finished with your current 1 month cycle of birth control pills.  OTHER INFORMATION  · If there is any medicine left over, throw it away.  · Never take someone else's antibiotics.  · Never take leftover antibiotics.  SEEK MEDICAL CARE IF:  · You get worse.  · You do not feel better within a few days of starting the  antibiotic medicine.  · You vomit.  · White patches appear in your mouth.  · You have new joint pain that begins after starting the antibiotic.  · You have new muscle aches that begin after starting the antibiotic.  · You had a fever before starting the antibiotic and it returns.  · You have any symptoms of an allergic reaction, such as an itchy rash. If this happens, stop taking the antibiotic.  SEEK IMMEDIATE MEDICAL CARE IF:  · Your urine turns dark or becomes blood-colored.  · Your skin turns yellow.  · You bruise or bleed easily.  · You have severe diarrhea and abdominal cramps.  · You have a severe headache.  · You have signs of a severe allergic reaction, such as:  ¨ Trouble breathing.  ¨ Wheezing.  ¨ Swelling of the lips, tongue, or face.  ¨ Fainting.  ¨ Blisters on the skin or in the mouth.  If you have signs of a severe allergic reaction, stop taking the antibiotic right away.  This information is not intended to replace advice given to you by your health care provider. Make sure you discuss any questions you have with your health care provider.  Document Released: 08/30/2005 Document Revised: 09/07/2016 Document Reviewed: 05/04/2016  [employer] Interactive Patient Education © 2017 [employer]    Depression / Suicide Risk    As you are discharged from this [facility] facility, it is important to learn how to keep safe from harming yourself.    Recognize the warning signs:  · Abrupt changes in personality, positive or negative- including increase in energy   · Giving away possessions  · Change in eating patterns- significant weight changes-  positive or negative  · Change in sleeping patterns- unable to sleep or sleeping all the time   · Unwillingness or inability to communicate  · Depression  · Unusual sadness, discouragement and loneliness  · Talk of wanting to die  · Neglect of personal appearance   · Rebelliousness- reckless behavior  · Withdrawal from people/activities they love  · Confusion-  inability to concentrate     If you or a loved one observes any of these behaviors or has concerns about self-harm, here's what you can do:  · Talk about it- your feelings and reasons for harming yourself  · Remove any means that you might use to hurt yourself (examples: pills, rope, extension cords, firearm)  · Get professional help from the community (Mental Health, Substance Abuse, psychological counseling)  · Do not be alone:Call your Safe Contact- someone whom you trust who will be there for you.  · Call your local CRISIS HOTLINE 153-2913 or 672-260-4097  · Call your local Children's Mobile Crisis Response Team [place] (084) 803-9416 or www.[employer]  · Call the toll free National Suicide Prevention Hotlines   · National Suicide Prevention Lifeline 466-415-GUXC (2808)  · National Hope Line Network 800-SUICIDE (482-2377)

## 2018-04-21 NOTE — PROGRESS NOTES
Received report from previous shift. Plan of care discussed with patient. All concerns voiced at this time. Patient is A&O 4, bed alarm on.  Patient educated on the importance of calling if in need of assistance.  Verbalized understanding.  Bed controls on, bed locked and in lowest position. Patient without pain at this time. Will continue to monitor for safety and comfort.

## 2018-04-21 NOTE — DISCHARGE SUMMARY
Hospital Medicine Discharge Note     Admit Date:  4/16/2018       Discharge Date:   4/21/2018    Attending Physician:  Terry Easton     Diagnoses (includes active and resolved):   Principal Problem:    Cholangitis POA: Yes  Active Problems:    Sepsis (CMS-HCC) POA: Yes    Gram-negative bacteremia POA: Yes    Encephalopathy POA: Yes    Essential hypertension POA: Yes    Coronary artery disease involving native heart without angina pectoris POA: Yes    Transaminitis POA: Yes    Hypokalemia POA: No    UTI (urinary tract infection) POA: Yes    Dyslipidemia POA: Yes    Anemia POA: Yes    History of gallbladder cancer POA: Yes    Weakness POA: Yes  Resolved Problems:    * No resolved hospital problems. *      Hospital Summary (Brief Narrative):       Patient was admitted with sepsis, unclear source initially, workup further revealed likely cholangitis. He also had gram-negative bacteremia. Also urinary tract infection. Blood cultures grew E. coli, urine cultures grew Klebsiella. He was on IV Zosyn. Gastroenterology was consulted. ERCP was done with balloon extraction done as well as stent placement. He does have a history of gallbladder cancer, has a previous stent in place. Electrolyte issues were addressed. Repeat cultures have been negative. He is tolerating a regular diet. Gastroenterology has cleared him for discharge. Physical and occupational therapy came to evaluate him, home health has been arranged. Of note his blood pressure was not under control so we increased his losartan dose with better control noted.         Consultants:      SKINNY Blackwell    Procedures:        ERCP with Balloon common bile duct stones extraction and prior metallic stent stone debris and friable tumor debris balloon extraction.  New uncovered 10mm x 60mm metallic biliary stent placement with good decompression and drainage.    Discharge Medications:        Medication Reconciliation Completed     Medication List      START taking these  medications      Instructions   cefdinir 300 MG Caps  Start taking on:  4/22/2018  Commonly known as:  OMNICEF   Take 1 Cap by mouth 2 times a day for 7 days.  Dose:  300 mg        CHANGE how you take these medications      Instructions   losartan 50 MG Tabs  Start taking on:  4/22/2018  What changed:  · medication strength  · how much to take  Commonly known as:  COZAAR   Take 1 Tab by mouth every day.  Dose:  50 mg        CONTINUE taking these medications      Instructions   aspirin 81 MG EC tablet   Take 1 Tab by mouth every day.  Dose:  81 mg     atorvastatin 10 MG Tabs  Commonly known as:  LIPITOR   Take 1 Tab by mouth every evening.  Dose:  10 mg     clopidogrel 75 MG Tabs  Commonly known as:  PLAVIX   Take 1 Tab by mouth every day.  Dose:  75 mg     loratadine 10 MG Tabs  Commonly known as:  CLARITIN   Take 10 mg by mouth every day.  Dose:  10 mg     MAGNESIUM PO   Take 250 mg by mouth 1 time daily as needed (supplement).  Dose:  250 mg     megestrol 20 MG Tabs  Commonly known as:  MEGACE   Take 1 Tab by mouth every day.  Dose:  20 mg     metoprolol SR 25 MG Tb24  Commonly known as:  TOPROL XL   Take 0.5 Tabs by mouth every day.  Dose:  12.5 mg     potassium chloride SA 20 MEQ Tbcr  Commonly known as:  Kdur   Take 1 Tab by mouth every day.  Dose:  20 mEq     terazosin 5 MG Caps  Commonly known as:  HYTRIN   Take 1 Cap by mouth every day.  Dose:  5 mg     Vitamin D (Cholecalciferol) 1000 units Caps   Take 2,000 Units by mouth every day at 6 PM.  Dose:  2000 Units        STOP taking these medications    fish oil 1000 MG Caps capsule              Disposition:  Home health    Diet:   Low Salt    Activity:   As tolerated    Code status:  Full    Primary Care Provider:    Fabiano Burk M.D.    Follow up appointment details :        No follow-up provider specified.  Future Appointments  Date Time Provider Department [facility]   4/24/2018 2:40 PM CARE MANAGER RYAN SONI   4/25/2018 11:40 AM JLUIS Ashraf  [facility]   6/11/2018 2:30 PM Tony Rodas M.D. [facility] None   7/11/2018 3:20 PM Fabiano Burk M.D. [address]   2/26/2019 1:20 PM VASCULAR NURSE PRACTITIONER [facility] None       Pending Studies:        None    Time spent on discharge day patient visit: 40  minutes    #################################################      Most Recent Labs:    Lab Results   Component Value Date/Time    WBC 6.6 04/21/2018 01:50 AM    RBC 3.32 (L) 04/21/2018 01:50 AM    HEMOGLOBIN 10.2 (L) 04/21/2018 01:50 AM    HEMATOCRIT 30.5 (L) 04/21/2018 01:50 AM    MCV 91.9 04/21/2018 01:50 AM    MCH 30.7 04/21/2018 01:50 AM    MCHC 33.4 (L) 04/21/2018 01:50 AM    MPV 9.7 04/21/2018 01:50 AM    NEUTSPOLYS 72.10 (H) 04/21/2018 01:50 AM    LYMPHOCYTES 11.30 (L) 04/21/2018 01:50 AM    MONOCYTES 11.60 04/21/2018 01:50 AM    EOSINOPHILS 4.20 04/21/2018 01:50 AM    BASOPHILS 0.50 04/21/2018 01:50 AM      Lab Results   Component Value Date/Time    SODIUM 136 04/21/2018 01:50 AM    POTASSIUM 3.9 04/21/2018 01:50 AM    CHLORIDE 109 04/21/2018 01:50 AM    CO2 22 04/21/2018 01:50 AM    GLUCOSE 124 (H) 04/21/2018 01:50 AM    BUN 19 04/21/2018 01:50 AM    CREATININE 0.63 04/21/2018 01:50 AM    BUNCREATRAT 25 (H) 11/01/2017 09:30 AM      Lab Results   Component Value Date/Time    ALTSGPT 49 04/21/2018 01:50 AM    ASTSGOT 20 04/21/2018 01:50 AM    ALKPHOSPHAT 279 (H) 04/21/2018 01:50 AM    TBILIRUBIN 0.8 04/21/2018 01:50 AM    ALBUMIN 2.8 (L) 04/21/2018 01:50 AM    GLOBULIN 2.5 04/21/2018 01:50 AM    INR 1.39 (H) 04/17/2018 04:51 AM     Lab Results   Component Value Date/Time    PROTHROMBTM 16.8 (H) 04/17/2018 04:51 AM    INR 1.39 (H) 04/17/2018 04:51 AM        Imaging/ Testing:      [id number] DUCTS   Final Result      Portable intraoperative imaging with findings as described above.      US-ABDOMEN LIMITED   Final Result      1.  Increased and heterogeneous hepatic echotexture without a discrete mass identified. Findings may be related to fatty  infiltration. Hepatocellular disease can have a similar appearance.      2.  Intrahepatic and extrahepatic ductal dilatation with common bile duct stent in place.      3.  Status post cholecystectomy.      4.  Atherosclerosis with distal abdominal aortic aneurysm.      DX-CHEST-PORTABLE (1 VIEW)   Final Result      1.  Mild left basilar opacification, likely atelectasis.      2.  Elevation of the left hemidiaphragm..          Instructions:      The patient was instructed to return to the ER in the event of worsening symptoms. I have counseled the patient on the importance of compliance and the patient has agreed to proceed with all medical recommendations and follow up plan indicated above.   The patient understands that all medications come with benefits and risks. Risks may include permanent injury or death and these risks can be minimized with close reassessment and monitoring.

## 2018-04-21 NOTE — PROGRESS NOTES
Patient discharged from hospital. Tele monitor and IV removed. Discussed prescriptions, discharge education, symptoms management with patient. Patient aware of follow-up appointment. Transported to taxi via wheelchair.

## 2018-04-21 NOTE — CARE PLAN
Problem: Safety  Goal: Will remain free from injury    Intervention: Provide assistance with mobility  Pt ambulated halls with SBA and fww.      Problem: Discharge Barriers/Planning  Goal: Patient's continuum of care needs will be met    Intervention: Involve patient and significant other/support system in setting and prioritizing goals for hospital stay and discharge  Discussed with pt POC and possible home with home health. Pt verbalized understanding.

## 2018-04-21 NOTE — PROGRESS NOTES
Report received by day shift RN. Pt sitting in bed awake alert and oriented x 4 with no c/o pain. Pt updated to POC and verbalized understanding. Bed locked in low position with fall precautions in place and call light in reach.

## 2018-04-22 LAB
BACTERIA BLD CULT: NORMAL
SIGNIFICANT IND 70042: NORMAL
SITE SITE: NORMAL
SOURCE SOURCE: NORMAL

## 2018-04-23 ENCOUNTER — PATIENT OUTREACH (OUTPATIENT)
Dept: HEALTH INFORMATION MANAGEMENT | Facility: OTHER | Age: 83
End: 2018-04-23

## 2018-04-23 ENCOUNTER — HOME CARE VISIT (OUTPATIENT)
Dept: HOME HEALTH SERVICES | Facility: HOME HEALTHCARE | Age: 83
End: 2018-04-23
Payer: MEDICARE

## 2018-04-23 VITALS
TEMPERATURE: 97.4 F | HEART RATE: 65 BPM | DIASTOLIC BLOOD PRESSURE: 62 MMHG | SYSTOLIC BLOOD PRESSURE: 102 MMHG | WEIGHT: 137.57 LBS | OXYGEN SATURATION: 98 % | RESPIRATION RATE: 16 BRPM | BODY MASS INDEX: 21.54 KG/M2

## 2018-04-23 PROCEDURE — 665001 SOC-HOME HEALTH

## 2018-04-23 PROCEDURE — 665999 HH PPS REVENUE DEBIT

## 2018-04-23 PROCEDURE — G0493 RN CARE EA 15 MIN HH/HOSPICE: HCPCS

## 2018-04-23 PROCEDURE — 665998 HH PPS REVENUE CREDIT

## 2018-04-23 SDOH — ECONOMIC STABILITY: HOUSING INSECURITY: UNSAFE APPLIANCES: 0

## 2018-04-23 SDOH — ECONOMIC STABILITY: HOUSING INSECURITY: UNSAFE COOKING RANGE AREA: 0

## 2018-04-23 ASSESSMENT — ENCOUNTER SYMPTOMS
NAUSEA: DENIES
VOMITING: DENIES

## 2018-04-23 ASSESSMENT — ACTIVITIES OF DAILY LIVING (ADL)
HOME_HEALTH_OASIS: 02
OASIS_M1830: 03

## 2018-04-23 ASSESSMENT — PATIENT HEALTH QUESTIONNAIRE - PHQ9
2. FEELING DOWN, DEPRESSED, IRRITABLE, OR HOPELESS: 00
1. LITTLE INTEREST OR PLEASURE IN DOING THINGS: 00

## 2018-04-24 ENCOUNTER — PATIENT OUTREACH (OUTPATIENT)
Dept: HEALTH INFORMATION MANAGEMENT | Facility: OTHER | Age: 83
End: 2018-04-24

## 2018-04-24 ENCOUNTER — HOME CARE VISIT (OUTPATIENT)
Dept: HOME HEALTH SERVICES | Facility: HOME HEALTHCARE | Age: 83
End: 2018-04-24
Payer: MEDICARE

## 2018-04-24 ENCOUNTER — TELEPHONE (OUTPATIENT)
Dept: HEALTH INFORMATION MANAGEMENT | Facility: OTHER | Age: 83
End: 2018-04-24

## 2018-04-24 LAB
BACTERIA BLD CULT: NORMAL
BACTERIA BLD CULT: NORMAL
SIGNIFICANT IND 70042: NORMAL
SIGNIFICANT IND 70042: NORMAL
SITE SITE: NORMAL
SITE SITE: NORMAL
SOURCE SOURCE: NORMAL
SOURCE SOURCE: NORMAL

## 2018-04-24 PROCEDURE — 665999 HH PPS REVENUE DEBIT

## 2018-04-24 PROCEDURE — 665998 HH PPS REVENUE CREDIT

## 2018-04-24 ASSESSMENT — ACTIVITIES OF DAILY LIVING (ADL): HOME_HEALTH_OASIS: 01

## 2018-04-24 NOTE — TELEPHONE ENCOUNTER
Medications reviewed against discharge summary. No clinically significant interactions noted.     Rain Blackwood, PharmD

## 2018-04-24 NOTE — PROGRESS NOTES
Received referral from [facility] for med rec. Medications reviewed against discharge summary. No clinically significant interactions noted.     Rain Blackwood, ElzbietaD

## 2018-04-25 ENCOUNTER — OFFICE VISIT (OUTPATIENT)
Dept: MEDICAL GROUP | Facility: CLINIC | Age: 83
End: 2018-04-25
Payer: MEDICARE

## 2018-04-25 ENCOUNTER — HOME CARE VISIT (OUTPATIENT)
Dept: HOME HEALTH SERVICES | Facility: HOME HEALTHCARE | Age: 83
End: 2018-04-25
Payer: MEDICARE

## 2018-04-25 VITALS
WEIGHT: 131 LBS | HEIGHT: 68 IN | SYSTOLIC BLOOD PRESSURE: 118 MMHG | HEART RATE: 81 BPM | DIASTOLIC BLOOD PRESSURE: 50 MMHG | TEMPERATURE: 98.2 F | OXYGEN SATURATION: 96 % | RESPIRATION RATE: 14 BRPM | BODY MASS INDEX: 19.85 KG/M2

## 2018-04-25 DIAGNOSIS — A41.9 SEPSIS, DUE TO UNSPECIFIED ORGANISM: ICD-10-CM

## 2018-04-25 DIAGNOSIS — R78.81 GRAM-NEGATIVE BACTEREMIA: ICD-10-CM

## 2018-04-25 DIAGNOSIS — Z09 HOSPITAL DISCHARGE FOLLOW-UP: ICD-10-CM

## 2018-04-25 DIAGNOSIS — K83.09 CHOLANGITIS: ICD-10-CM

## 2018-04-25 DIAGNOSIS — I10 ESSENTIAL HYPERTENSION: ICD-10-CM

## 2018-04-25 DIAGNOSIS — R63.0 APPETITE LOSS: ICD-10-CM

## 2018-04-25 PROCEDURE — 665998 HH PPS REVENUE CREDIT

## 2018-04-25 PROCEDURE — 665999 HH PPS REVENUE DEBIT

## 2018-04-25 PROCEDURE — G0299 HHS/HOSPICE OF RN EA 15 MIN: HCPCS

## 2018-04-25 PROCEDURE — 99495 TRANSJ CARE MGMT MOD F2F 14D: CPT | Performed by: FAMILY MEDICINE

## 2018-04-25 NOTE — PROGRESS NOTES
Subjective:     Donato Burciaga is a 88 y.o. male who presents for Hospital Follow-up.  Chart and discharge summary reviewed.   Date of discharge 4/21/18.  48- hour post discharge RN call completed on 4/24/17 and documented in the medical record by Marcela Quintero RN:   POST DISCHARGE CALL:  Discharge Date:4/21/2018   Date of Outreach Call: 4/24/2018  2:51 PM  Now that you're home, how are you doing? Good  Do you have questions about your medications? No    Did you fill your medications? Yes    Do you have a follow-up appointment scheduled?Yes  Comment:Post discharge clinic on 4/25/18    Discharging Department: Telemetry 8    Number of Attempts: 1  Current or previous attempts completed within two business days of discharge? Yes  Provided education regarding treatment plan, medication, self-management, ADLs? Yes  Has patient completed Advance Directive? If yes, advise them to bring to appointment. No  Care Manager phone number provided? Yes  Is there anything else I can help you with? No        HPI: Recently hospitalized for sepsis and diagnosed with cholangitis along with gram-negative bacteremia and urinary tract infection. Blood cultures grew out Escherichia coli, urine culture grew Klebsiella. He was treated with IV Zosyn. 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 ERCP was done and stent was replaced and balloon extraction was done.  His blood pressure was running high so his losartan was increased from 25 mg to 50 mg daily. However at home his blood pressure has been low and his wife continues to give him the 25 mg dose.    Since returning home, patient reports feeling good. He says his appetite has improved.     The patient has questions regarding hospitalization or discharge: All questions from he and his wife were answered. His wife wonders what else he can have to gain weight.  The patient has weakness; has some difficulty taking care of self at home. His wife is his  "caregiver  Patient reports taking medications as instructed.      Allergies:   Patient has no known allergies.    Social History:  Social History   Substance Use Topics   • Smoking status: Former Smoker     Years: 0.00     Types: Cigarettes     Quit date: 1/1/1960   • Smokeless tobacco: Never Used   • Alcohol use No        ROS:    Constitutional:  Denies fever, chills, night sweats, fatigue or malaise  HENT: Denies head congestion, ear pain or drainage, decreased hearing, sore throat  Eyes: Denies visual changes, eye drainage or redness, eye pain  Neck: Denies pain, swollen glands, decreased range of motion  Lungs: Denies shortness of breath, wheezing, cough  Cardiovascular: Denies chest pain, orthopnea, lower extremity edema, palpitations  Abdominal: Denies abdominal pain, change in bowel or bladder habits, nausea or vomiting  Endocrine: Denies unexplained weight changes, heat or cold intolerance, hair loss, polyuria or polydipsia  Neurological: Denies dizziness, headache, confusion, focal weakness or numbness, memory loss  Psychiatric: Denies depression, anxiety, insomnia       Objective:     Blood pressure 118/50, pulse 81, temperature 36.8 °C (98.2 °F), resp. rate 14, height 1.727 m (5' 8\"), weight 59.4 kg (131 lb), SpO2 96 %.     Physical Exam:    GEN:  Alert, oriented, in no distress  HEENT:  PERRLA, EOMI  NECK;  Supple without adenopathy or thyromegaly.    LUNGS:  Clear to auscultation without rales, rhonci, or wheezes.  CV:  Heart RRR without murmurs or S3 or S4  ABD: Soft, nontender.   EXT:  Without cyanosis, clubbing, or edema.  NEURO:  Cranial nerves II through XII intact.  Motor function and sensation grossly intact. Patient ambulates with a walker  SKIN: No rashes or suspicious lesions.  PSYCH:  Behavior is appropriate.      Assessment and Plan:     1. Hospital follow-up:   Hospitalization and results reviewed with patient. High risk conditions requiring teaching or care coordination were identified and " addressed.The patient demonstrate understanding of admission and underlying conditions. The patient understands discharge instructions and when to seek medical attention. Medications reviewed including instructions regarding high risk medications, dosing and side effects.    The patient is able to safely adhere to ADL/IADL, treatment and medication regimen, self-manage of high-risk conditions? No   The patient requires physical therapy/home health/DME referral? Yes   The patient requires referral to care coordination/behavioral health/social work?  No   Patient requires referral for pharmacy consult? No   Advance directive/POLST on file?  No   Required counseling on advance directive?  Deferred    2. Sepsis, due to unspecified organism (CMS-HCC)  -Clinically resolved    3. Cholangitis  -Clinically resolved    4. Gram-negative bacteremia  -Clinically resolved    5. Essential hypertension  -Continue losartan 25 mg daily along with low-dose metoprolol    6. Appetite loss  -He is on Megace. He states he is eating meals normally. I suggested that he could supplement his intake with one or 2 cans of ensure a day in between meals        Medication Reconciliation  Medication list at end of encounter:   Current Outpatient Prescriptions   Medication Sig Dispense Refill   • losartan (COZAAR) 50 MG Tab Take 1 Tab by mouth every day. 30 Tab 1   • cefdinir (OMNICEF) 300 MG Cap Take 1 Cap by mouth 2 times a day for 7 days. 14 Cap 0   • atorvastatin (LIPITOR) 10 MG Tab Take 1 Tab by mouth every evening. 90 Tab 3   • clopidogrel (PLAVIX) 75 MG Tab Take 1 Tab by mouth every day. 90 Tab 3   • metoprolol SR (TOPROL XL) 25 MG TABLET SR 24 HR Take 0.5 Tabs by mouth every day. 90 Tab 3   • potassium chloride SA (KDUR) 20 MEQ Tab CR Take 1 Tab by mouth every day. 90 Tab 3   • megestrol (MEGACE) 20 MG Tab Take 1 Tab by mouth every day. 90 Tab 3   • terazosin (HYTRIN) 5 MG Cap Take 1 Cap by mouth every day. 90 Cap 3   • aspirin EC 81 MG EC  tablet Take 1 Tab by mouth every day. 30 Tab 3   • Vitamin D, Cholecalciferol, 1000 UNITS Cap Take 2,000 Units by mouth every day at 6 PM.     • MAGNESIUM PO Take 250 mg by mouth 1 time daily as needed (supplement).       No current facility-administered medications for this visit.        Primary care follow-up:  New health conditions identified during hospitalization? Yes   Changes to medications during hospitalization or today? Yes , losartan was increased but he is now back to his previous dosage of 25 mg daily.    Recommended followup:  with Fabiano Burk M.D.   Future Appointments       Provider Department Center    4/25/2018 JAI Saldaña R.N. [facility]     4/27/2018 TBD Maria Victoria Faith, [facility]     5/3/2018 JAI Saldaña R.N. [facility]     5/5/2018 D Jordan Saldaña R.N. [facility]     5/9/2018 D Jordan Saldaña R.N. [facility]     5/16/2018 ERIC Saldaña R.N. [facility]     5/23/2018 D Jordan Saldaña R.N. [facility]     5/30/2018 D Jordan Saldaña R.N. [facility]     5/30/2018 4:00 PM Fabiano Burk M.D. [address]    6/6/2018 JAI Saldaña R.N. [facility]     6/11/2018 2:30 PM Tony Rodas M.D. [facility]     6/13/2018 ERIC Saldaña R.N. [facility]     6/20/2018 D Jordan Saldaña R.N. [facility]     7/11/2018 3:20 PM Fabiano Burk M.D. [address]    2/26/2019 1:20 PM VASCULAR NURSE PRACTITIONER [facility]            Patient Instruction  Patient provided with educational material on discharge diagnosis and management of symptoms/red flags. Patient instructed to keep follow-up appointments and to bring written questions and and actual medications to each office visit. Patient instructed to call PCP/specialist with any  problems/questions/concerns. Patient verbalizes understanding and has no further questions at this time.    Face-to-face transitional care management services with high medical decision complexity were provided.

## 2018-04-25 NOTE — PATIENT INSTRUCTIONS
If you need further assistance, or have any questions; concerns or lingering symptoms before seeing your Primary Care Provider or specialist.     Do not hesitate to contact us.     Please contact us at the Post-Hospital Follow Up Program at [phone].   Our offices hours are Monday-Friday 8 am-5 pm.

## 2018-04-26 PROCEDURE — 665998 HH PPS REVENUE CREDIT

## 2018-04-26 PROCEDURE — 665999 HH PPS REVENUE DEBIT

## 2018-04-27 PROCEDURE — 665998 HH PPS REVENUE CREDIT

## 2018-04-27 PROCEDURE — 665999 HH PPS REVENUE DEBIT

## 2018-04-28 PROCEDURE — 665998 HH PPS REVENUE CREDIT

## 2018-04-28 PROCEDURE — 665999 HH PPS REVENUE DEBIT

## 2018-04-29 PROCEDURE — 665999 HH PPS REVENUE DEBIT

## 2018-04-29 PROCEDURE — 665998 HH PPS REVENUE CREDIT

## 2018-04-30 ENCOUNTER — HOME CARE VISIT (OUTPATIENT)
Dept: HOME HEALTH SERVICES | Facility: HOME HEALTHCARE | Age: 83
End: 2018-04-30
Payer: MEDICARE

## 2018-04-30 VITALS
OXYGEN SATURATION: 96 % | RESPIRATION RATE: 16 BRPM | TEMPERATURE: 99.1 F | SYSTOLIC BLOOD PRESSURE: 118 MMHG | DIASTOLIC BLOOD PRESSURE: 50 MMHG | HEART RATE: 68 BPM

## 2018-04-30 VITALS
SYSTOLIC BLOOD PRESSURE: 108 MMHG | DIASTOLIC BLOOD PRESSURE: 52 MMHG | TEMPERATURE: 98.3 F | OXYGEN SATURATION: 98 % | WEIGHT: 132 LBS | HEART RATE: 62 BPM | BODY MASS INDEX: 20.07 KG/M2 | RESPIRATION RATE: 16 BRPM

## 2018-04-30 PROCEDURE — 665998 HH PPS REVENUE CREDIT

## 2018-04-30 PROCEDURE — G0151 HHCP-SERV OF PT,EA 15 MIN: HCPCS

## 2018-04-30 PROCEDURE — 665999 HH PPS REVENUE DEBIT

## 2018-04-30 SDOH — ECONOMIC STABILITY: HOUSING INSECURITY: UNSAFE APPLIANCES: 0

## 2018-04-30 SDOH — ECONOMIC STABILITY: HOUSING INSECURITY: UNSAFE COOKING RANGE AREA: 0

## 2018-04-30 ASSESSMENT — ENCOUNTER SYMPTOMS
NAUSEA: DENIES
RESPIRATORY SYMPTOMS COMMENTS: DENIES SOB, RESP EVEN AND UNLABORED
DIFFICULTY THINKING: 1
POOR JUDGMENT: 1

## 2018-04-30 ASSESSMENT — ACTIVITIES OF DAILY LIVING (ADL): IADLS_COMMENTS: <!--EPICS-->REQUIRES ASSIST WITH ALL IADLS<!--EPICE-->

## 2018-05-01 ENCOUNTER — HOME CARE VISIT (OUTPATIENT)
Dept: HOME HEALTH SERVICES | Facility: HOME HEALTHCARE | Age: 83
End: 2018-05-01
Payer: MEDICARE

## 2018-05-01 VITALS
HEART RATE: 61 BPM | OXYGEN SATURATION: 99 % | TEMPERATURE: 99.1 F | RESPIRATION RATE: 16 BRPM | DIASTOLIC BLOOD PRESSURE: 50 MMHG | SYSTOLIC BLOOD PRESSURE: 108 MMHG

## 2018-05-01 PROCEDURE — 665999 HH PPS REVENUE DEBIT

## 2018-05-01 PROCEDURE — G0152 HHCP-SERV OF OT,EA 15 MIN: HCPCS

## 2018-05-01 PROCEDURE — 665998 HH PPS REVENUE CREDIT

## 2018-05-01 SDOH — ECONOMIC STABILITY: HOUSING INSECURITY: HOME SAFETY: VERY NEATLY KEPTS SINGLE STORY HOME WITH NO STEPS TO ENTRY, ONE STEP TO BREAKFAST PORCH; ONE SMALL DOG

## 2018-05-01 ASSESSMENT — ACTIVITIES OF DAILY LIVING (ADL)
DRESSING_UB_ASSISTANCE: 0
BATHING_ASSISTANCE: 0
GROOMING_ASSISTANCE: 0
LAUNDRY_ASSISTANCE: 6
MEAL_PREP_ASSISTANCE: 6
HOUSEKEEPING_ASSISTANCE: 5
DRESSING_LB_ASSISTANCE: 0
IADLS_COMMENTS: ICE-->
TELEPHONE_ASSISTANCE: 1
EATING_ASSISTANCE: 0
TRANSPORTATION_ASSISTANCE: 6
TOILETING_ASSISTANCE: 0
SHOPPING_ASSISTANCE: 6
ORAL_CARE_ASSISTANCE: 0

## 2018-05-01 ASSESSMENT — ENCOUNTER SYMPTOMS: DIFFICULTY THINKING: 1

## 2018-05-02 PROCEDURE — G0180 MD CERTIFICATION HHA PATIENT: HCPCS | Performed by: INTERNAL MEDICINE

## 2018-05-02 PROCEDURE — 665998 HH PPS REVENUE CREDIT

## 2018-05-02 PROCEDURE — 665999 HH PPS REVENUE DEBIT

## 2018-05-03 ENCOUNTER — HOME CARE VISIT (OUTPATIENT)
Dept: HOME HEALTH SERVICES | Facility: HOME HEALTHCARE | Age: 83
End: 2018-05-03
Payer: MEDICARE

## 2018-05-03 VITALS
DIASTOLIC BLOOD PRESSURE: 52 MMHG | OXYGEN SATURATION: 97 % | HEART RATE: 74 BPM | RESPIRATION RATE: 18 BRPM | SYSTOLIC BLOOD PRESSURE: 126 MMHG | TEMPERATURE: 97.9 F

## 2018-05-03 VITALS
DIASTOLIC BLOOD PRESSURE: 52 MMHG | SYSTOLIC BLOOD PRESSURE: 99 MMHG | RESPIRATION RATE: 18 BRPM | TEMPERATURE: 98.9 F | HEART RATE: 76 BPM | OXYGEN SATURATION: 99 %

## 2018-05-03 PROCEDURE — 665999 HH PPS REVENUE DEBIT

## 2018-05-03 PROCEDURE — G0496 LPN CARE TRAIN/EDU IN HH: HCPCS

## 2018-05-03 PROCEDURE — G0152 HHCP-SERV OF OT,EA 15 MIN: HCPCS

## 2018-05-03 PROCEDURE — G0151 HHCP-SERV OF PT,EA 15 MIN: HCPCS

## 2018-05-03 PROCEDURE — 665998 HH PPS REVENUE CREDIT

## 2018-05-03 SDOH — ECONOMIC STABILITY: HOUSING INSECURITY: UNSAFE COOKING RANGE AREA: 0

## 2018-05-03 SDOH — ECONOMIC STABILITY: HOUSING INSECURITY: UNSAFE APPLIANCES: 0

## 2018-05-03 ASSESSMENT — ENCOUNTER SYMPTOMS
DIFFICULTY THINKING: 1
POOR JUDGMENT: 1

## 2018-05-04 VITALS
SYSTOLIC BLOOD PRESSURE: 108 MMHG | HEART RATE: 63 BPM | RESPIRATION RATE: 18 BRPM | OXYGEN SATURATION: 97 % | DIASTOLIC BLOOD PRESSURE: 52 MMHG

## 2018-05-04 PROCEDURE — 665999 HH PPS REVENUE DEBIT

## 2018-05-04 PROCEDURE — 665998 HH PPS REVENUE CREDIT

## 2018-05-05 ENCOUNTER — HOME CARE VISIT (OUTPATIENT)
Dept: HOME HEALTH SERVICES | Facility: HOME HEALTHCARE | Age: 83
End: 2018-05-05
Payer: MEDICARE

## 2018-05-05 PROCEDURE — 665999 HH PPS REVENUE DEBIT

## 2018-05-05 PROCEDURE — 665998 HH PPS REVENUE CREDIT

## 2018-05-06 PROCEDURE — 665999 HH PPS REVENUE DEBIT

## 2018-05-06 PROCEDURE — 665998 HH PPS REVENUE CREDIT

## 2018-05-07 ENCOUNTER — HOME CARE VISIT (OUTPATIENT)
Dept: HOME HEALTH SERVICES | Facility: HOME HEALTHCARE | Age: 83
End: 2018-05-07
Payer: MEDICARE

## 2018-05-07 PROCEDURE — G0152 HHCP-SERV OF OT,EA 15 MIN: HCPCS

## 2018-05-07 PROCEDURE — 665998 HH PPS REVENUE CREDIT

## 2018-05-07 PROCEDURE — 665999 HH PPS REVENUE DEBIT

## 2018-05-08 ENCOUNTER — HOME CARE VISIT (OUTPATIENT)
Dept: HOME HEALTH SERVICES | Facility: HOME HEALTHCARE | Age: 83
End: 2018-05-08
Payer: MEDICARE

## 2018-05-08 VITALS
HEART RATE: 77 BPM | TEMPERATURE: 99 F | SYSTOLIC BLOOD PRESSURE: 118 MMHG | DIASTOLIC BLOOD PRESSURE: 50 MMHG | OXYGEN SATURATION: 99 % | RESPIRATION RATE: 18 BRPM

## 2018-05-08 VITALS
RESPIRATION RATE: 18 BRPM | TEMPERATURE: 99.5 F | HEART RATE: 71 BPM | SYSTOLIC BLOOD PRESSURE: 112 MMHG | OXYGEN SATURATION: 97 % | DIASTOLIC BLOOD PRESSURE: 48 MMHG

## 2018-05-08 PROCEDURE — 665999 HH PPS REVENUE DEBIT

## 2018-05-08 PROCEDURE — 665998 HH PPS REVENUE CREDIT

## 2018-05-08 PROCEDURE — G0155 HHCP-SVS OF CSW,EA 15 MIN: HCPCS

## 2018-05-08 PROCEDURE — G0151 HHCP-SERV OF PT,EA 15 MIN: HCPCS

## 2018-05-08 ASSESSMENT — ENCOUNTER SYMPTOMS
DIFFICULTY THINKING: 1
DEPRESSED MOOD: 1

## 2018-05-08 ASSESSMENT — SOCIAL DETERMINANTS OF HEALTH (SDOH): IS CONCERNED ABOUT INCOME: N

## 2018-05-09 ENCOUNTER — HOME CARE VISIT (OUTPATIENT)
Dept: HOME HEALTH SERVICES | Facility: HOME HEALTHCARE | Age: 83
End: 2018-05-09
Payer: MEDICARE

## 2018-05-09 VITALS
DIASTOLIC BLOOD PRESSURE: 58 MMHG | HEART RATE: 76 BPM | TEMPERATURE: 98.5 F | SYSTOLIC BLOOD PRESSURE: 110 MMHG | RESPIRATION RATE: 18 BRPM | OXYGEN SATURATION: 99 %

## 2018-05-09 PROCEDURE — 665998 HH PPS REVENUE CREDIT

## 2018-05-09 PROCEDURE — 665999 HH PPS REVENUE DEBIT

## 2018-05-09 PROCEDURE — G0496 LPN CARE TRAIN/EDU IN HH: HCPCS

## 2018-05-09 PROCEDURE — G0152 HHCP-SERV OF OT,EA 15 MIN: HCPCS

## 2018-05-09 ASSESSMENT — ENCOUNTER SYMPTOMS: DIFFICULTY THINKING: 1

## 2018-05-10 ENCOUNTER — HOME CARE VISIT (OUTPATIENT)
Dept: HOME HEALTH SERVICES | Facility: HOME HEALTHCARE | Age: 83
End: 2018-05-10
Payer: MEDICARE

## 2018-05-10 VITALS
RESPIRATION RATE: 16 BRPM | OXYGEN SATURATION: 96 % | SYSTOLIC BLOOD PRESSURE: 134 MMHG | TEMPERATURE: 98.1 F | HEART RATE: 71 BPM | DIASTOLIC BLOOD PRESSURE: 60 MMHG

## 2018-05-10 PROCEDURE — G0151 HHCP-SERV OF PT,EA 15 MIN: HCPCS

## 2018-05-10 PROCEDURE — 665999 HH PPS REVENUE DEBIT

## 2018-05-10 PROCEDURE — 665998 HH PPS REVENUE CREDIT

## 2018-05-10 SDOH — ECONOMIC STABILITY: HOUSING INSECURITY: UNSAFE APPLIANCES: 0

## 2018-05-10 SDOH — ECONOMIC STABILITY: HOUSING INSECURITY: UNSAFE COOKING RANGE AREA: 0

## 2018-05-10 ASSESSMENT — ENCOUNTER SYMPTOMS: RESPIRATORY SYMPTOMS COMMENTS: PT LUNGS CLEAR IN ALL FIELDS, NO ADVANTAGEOUS SOUND NOTED.

## 2018-05-11 ENCOUNTER — HOME CARE VISIT (OUTPATIENT)
Dept: HOME HEALTH SERVICES | Facility: HOME HEALTHCARE | Age: 83
End: 2018-05-11
Payer: MEDICARE

## 2018-05-11 VITALS
DIASTOLIC BLOOD PRESSURE: 50 MMHG | SYSTOLIC BLOOD PRESSURE: 110 MMHG | HEART RATE: 67 BPM | TEMPERATURE: 99.2 F | RESPIRATION RATE: 18 BRPM | OXYGEN SATURATION: 98 %

## 2018-05-11 PROCEDURE — 665999 HH PPS REVENUE DEBIT

## 2018-05-11 PROCEDURE — 665998 HH PPS REVENUE CREDIT

## 2018-05-11 SDOH — ECONOMIC STABILITY: HOUSING INSECURITY: UNSAFE APPLIANCES: 0

## 2018-05-11 SDOH — ECONOMIC STABILITY: HOUSING INSECURITY: UNSAFE COOKING RANGE AREA: 0

## 2018-05-12 PROCEDURE — 665998 HH PPS REVENUE CREDIT

## 2018-05-12 PROCEDURE — 665999 HH PPS REVENUE DEBIT

## 2018-05-13 PROCEDURE — 665998 HH PPS REVENUE CREDIT

## 2018-05-13 PROCEDURE — 665999 HH PPS REVENUE DEBIT

## 2018-05-14 ENCOUNTER — HOME CARE VISIT (OUTPATIENT)
Dept: HOME HEALTH SERVICES | Facility: HOME HEALTHCARE | Age: 83
End: 2018-05-14
Payer: MEDICARE

## 2018-05-14 PROCEDURE — G0152 HHCP-SERV OF OT,EA 15 MIN: HCPCS

## 2018-05-14 PROCEDURE — 665998 HH PPS REVENUE CREDIT

## 2018-05-14 PROCEDURE — 665999 HH PPS REVENUE DEBIT

## 2018-05-15 VITALS
DIASTOLIC BLOOD PRESSURE: 60 MMHG | RESPIRATION RATE: 18 BRPM | SYSTOLIC BLOOD PRESSURE: 118 MMHG | TEMPERATURE: 99.2 F | OXYGEN SATURATION: 99 % | HEART RATE: 81 BPM

## 2018-05-15 PROCEDURE — 665998 HH PPS REVENUE CREDIT

## 2018-05-15 PROCEDURE — 665999 HH PPS REVENUE DEBIT

## 2018-05-15 ASSESSMENT — ENCOUNTER SYMPTOMS: DIFFICULTY THINKING: 1

## 2018-05-16 ENCOUNTER — HOME CARE VISIT (OUTPATIENT)
Dept: HOME HEALTH SERVICES | Facility: HOME HEALTHCARE | Age: 83
End: 2018-05-16
Payer: MEDICARE

## 2018-05-16 VITALS
TEMPERATURE: 99.3 F | SYSTOLIC BLOOD PRESSURE: 105 MMHG | OXYGEN SATURATION: 99 % | RESPIRATION RATE: 18 BRPM | DIASTOLIC BLOOD PRESSURE: 50 MMHG | HEART RATE: 76 BPM

## 2018-05-16 PROCEDURE — 665998 HH PPS REVENUE CREDIT

## 2018-05-16 PROCEDURE — G0494 LPN CARE EA 15MIN HH/HOSPICE: HCPCS

## 2018-05-16 PROCEDURE — G0152 HHCP-SERV OF OT,EA 15 MIN: HCPCS

## 2018-05-16 PROCEDURE — 665999 HH PPS REVENUE DEBIT

## 2018-05-16 ASSESSMENT — ENCOUNTER SYMPTOMS
DIFFICULTY THINKING: 1
DEPRESSED MOOD: 1

## 2018-05-17 VITALS
HEART RATE: 84 BPM | RESPIRATION RATE: 18 BRPM | OXYGEN SATURATION: 96 % | TEMPERATURE: 98.8 F | SYSTOLIC BLOOD PRESSURE: 136 MMHG | DIASTOLIC BLOOD PRESSURE: 74 MMHG

## 2018-05-17 PROCEDURE — 665998 HH PPS REVENUE CREDIT

## 2018-05-17 PROCEDURE — 665999 HH PPS REVENUE DEBIT

## 2018-05-18 PROCEDURE — 665999 HH PPS REVENUE DEBIT

## 2018-05-18 PROCEDURE — 665998 HH PPS REVENUE CREDIT

## 2018-05-19 PROCEDURE — 665999 HH PPS REVENUE DEBIT

## 2018-05-19 PROCEDURE — 665998 HH PPS REVENUE CREDIT

## 2018-05-20 PROCEDURE — 665998 HH PPS REVENUE CREDIT

## 2018-05-20 PROCEDURE — 665999 HH PPS REVENUE DEBIT

## 2018-05-21 ENCOUNTER — PATIENT OUTREACH (OUTPATIENT)
Dept: HEALTH INFORMATION MANAGEMENT | Facility: OTHER | Age: 83
End: 2018-05-21

## 2018-05-21 PROCEDURE — 665999 HH PPS REVENUE DEBIT

## 2018-05-21 PROCEDURE — 665998 HH PPS REVENUE CREDIT

## 2018-05-22 ENCOUNTER — HOME CARE VISIT (OUTPATIENT)
Dept: HOME HEALTH SERVICES | Facility: HOME HEALTHCARE | Age: 83
End: 2018-05-22
Payer: MEDICARE

## 2018-05-22 PROCEDURE — 665998 HH PPS REVENUE CREDIT

## 2018-05-22 PROCEDURE — G0152 HHCP-SERV OF OT,EA 15 MIN: HCPCS

## 2018-05-22 PROCEDURE — 665999 HH PPS REVENUE DEBIT

## 2018-05-23 ENCOUNTER — HOME CARE VISIT (OUTPATIENT)
Dept: HOME HEALTH SERVICES | Facility: HOME HEALTHCARE | Age: 83
End: 2018-05-23
Payer: MEDICARE

## 2018-05-23 VITALS
SYSTOLIC BLOOD PRESSURE: 120 MMHG | RESPIRATION RATE: 18 BRPM | TEMPERATURE: 99.7 F | DIASTOLIC BLOOD PRESSURE: 52 MMHG | OXYGEN SATURATION: 99 % | HEART RATE: 77 BPM

## 2018-05-23 PROCEDURE — 665999 HH PPS REVENUE DEBIT

## 2018-05-23 PROCEDURE — 665998 HH PPS REVENUE CREDIT

## 2018-05-23 PROCEDURE — G0299 HHS/HOSPICE OF RN EA 15 MIN: HCPCS

## 2018-05-23 ASSESSMENT — ENCOUNTER SYMPTOMS
DEPRESSED MOOD: 1
DIFFICULTY THINKING: 1

## 2018-05-24 ENCOUNTER — HOME CARE VISIT (OUTPATIENT)
Dept: HOME HEALTH SERVICES | Facility: HOME HEALTHCARE | Age: 83
End: 2018-05-24
Payer: MEDICARE

## 2018-05-24 VITALS
OXYGEN SATURATION: 98 % | HEART RATE: 83 BPM | SYSTOLIC BLOOD PRESSURE: 118 MMHG | RESPIRATION RATE: 18 BRPM | TEMPERATURE: 99.6 F | DIASTOLIC BLOOD PRESSURE: 57 MMHG

## 2018-05-24 PROCEDURE — 665998 HH PPS REVENUE CREDIT

## 2018-05-24 PROCEDURE — G0152 HHCP-SERV OF OT,EA 15 MIN: HCPCS

## 2018-05-24 PROCEDURE — 665999 HH PPS REVENUE DEBIT

## 2018-05-24 ASSESSMENT — ACTIVITIES OF DAILY LIVING (ADL)
SHOPPING_ASSISTANCE: 5
DRESSING_LB_ASSISTANCE: 0
DRESSING_UB_ASSISTANCE: 0
BATHING_ASSISTIVE_EQUIPMENT_USED: SHOWER CHAIR, GRAB BAR
TOILETING_ASSISTANCE: 0
LAUNDRY_ASSISTANCE: 4
EATING_ASSISTANCE: 0
BATHING_ASSISTANCE: 0
TELEPHONE_ASSISTANCE: 1
HOUSEKEEPING_ASSISTANCE: 4
MEAL_PREP_ASSISTANCE: 5
GROOMING_ASSISTANCE: 0
TRANSPORTATION_ASSISTANCE: 6
ORAL_CARE_ASSISTANCE: 0

## 2018-05-24 ASSESSMENT — ENCOUNTER SYMPTOMS: DIFFICULTY THINKING: 1

## 2018-05-24 NOTE — PROGRESS NOTES
Patient was an emergent admission who discharged on 4/21. Patient advocate was able to speak with the patient as his spouse several times to ensure that he was following up with providers as well as taking his medications as directed. Patient advocate also assisted the patient with coordinating an appointment with [facility]. Patient kept appointments with [facility] services starting 4/23 and his PCP 4/25. He has follow up appointments scheduled for 5/20 with his PCP, Cardiology 6/11, and Vascular surgery on 2/26/19. PPS 80%.

## 2018-05-25 VITALS
DIASTOLIC BLOOD PRESSURE: 54 MMHG | OXYGEN SATURATION: 98 % | SYSTOLIC BLOOD PRESSURE: 120 MMHG | HEART RATE: 78 BPM | RESPIRATION RATE: 18 BRPM | TEMPERATURE: 99.3 F

## 2018-05-25 PROCEDURE — 665998 HH PPS REVENUE CREDIT

## 2018-05-25 PROCEDURE — 665999 HH PPS REVENUE DEBIT

## 2018-05-25 ASSESSMENT — ENCOUNTER SYMPTOMS
NAUSEA: DENIES
RESPIRATORY SYMPTOMS COMMENTS: DENIES SOB, RESP EVEN AND UNLABORED

## 2018-05-26 PROCEDURE — 665998 HH PPS REVENUE CREDIT

## 2018-05-26 PROCEDURE — 665999 HH PPS REVENUE DEBIT

## 2018-05-27 PROCEDURE — 665998 HH PPS REVENUE CREDIT

## 2018-05-27 PROCEDURE — 665999 HH PPS REVENUE DEBIT

## 2018-05-28 PROCEDURE — 665998 HH PPS REVENUE CREDIT

## 2018-05-28 PROCEDURE — 665999 HH PPS REVENUE DEBIT

## 2018-05-29 ENCOUNTER — HOME CARE VISIT (OUTPATIENT)
Dept: HOME HEALTH SERVICES | Facility: HOME HEALTHCARE | Age: 83
End: 2018-05-29
Payer: MEDICARE

## 2018-05-29 PROCEDURE — 665999 HH PPS REVENUE DEBIT

## 2018-05-29 PROCEDURE — G0299 HHS/HOSPICE OF RN EA 15 MIN: HCPCS

## 2018-05-29 PROCEDURE — 665998 HH PPS REVENUE CREDIT

## 2018-05-30 ENCOUNTER — HOSPITAL ENCOUNTER (OUTPATIENT)
Dept: LAB | Facility: MEDICAL CENTER | Age: 83
End: 2018-05-30
Attending: INTERNAL MEDICINE
Payer: MEDICARE

## 2018-05-30 ENCOUNTER — OFFICE VISIT (OUTPATIENT)
Dept: MEDICAL GROUP | Facility: MEDICAL CENTER | Age: 83
End: 2018-05-30
Payer: MEDICARE

## 2018-05-30 VITALS
SYSTOLIC BLOOD PRESSURE: 120 MMHG | DIASTOLIC BLOOD PRESSURE: 54 MMHG | HEART RATE: 78 BPM | RESPIRATION RATE: 18 BRPM | OXYGEN SATURATION: 100 % | TEMPERATURE: 99.1 F

## 2018-05-30 VITALS
TEMPERATURE: 99.3 F | DIASTOLIC BLOOD PRESSURE: 60 MMHG | RESPIRATION RATE: 16 BRPM | OXYGEN SATURATION: 96 % | HEART RATE: 81 BPM | BODY MASS INDEX: 20.76 KG/M2 | HEIGHT: 68 IN | SYSTOLIC BLOOD PRESSURE: 128 MMHG | WEIGHT: 137 LBS

## 2018-05-30 DIAGNOSIS — I10 ESSENTIAL HYPERTENSION: ICD-10-CM

## 2018-05-30 DIAGNOSIS — K83.09 CHOLANGITIS: ICD-10-CM

## 2018-05-30 DIAGNOSIS — D64.9 ANEMIA, UNSPECIFIED TYPE: ICD-10-CM

## 2018-05-30 DIAGNOSIS — R53.81 DEBILITY: ICD-10-CM

## 2018-05-30 PROBLEM — G93.40 ENCEPHALOPATHY: Status: RESOLVED | Noted: 2018-04-18 | Resolved: 2018-05-30

## 2018-05-30 PROBLEM — R78.81 GRAM-NEGATIVE BACTEREMIA: Status: RESOLVED | Noted: 2018-04-18 | Resolved: 2018-05-30

## 2018-05-30 PROBLEM — R74.01 TRANSAMINITIS: Status: RESOLVED | Noted: 2018-04-17 | Resolved: 2018-05-30

## 2018-05-30 PROBLEM — N39.0 UTI (URINARY TRACT INFECTION): Status: RESOLVED | Noted: 2018-04-19 | Resolved: 2018-05-30

## 2018-05-30 LAB
ALBUMIN SERPL BCP-MCNC: 3.6 G/DL (ref 3.2–4.9)
ALBUMIN/GLOB SERPL: 1.4 G/DL
ALP SERPL-CCNC: 97 U/L (ref 30–99)
ALT SERPL-CCNC: 47 U/L (ref 2–50)
ANION GAP SERPL CALC-SCNC: 3 MMOL/L (ref 0–11.9)
AST SERPL-CCNC: 33 U/L (ref 12–45)
BASOPHILS # BLD AUTO: 0.3 % (ref 0–1.8)
BASOPHILS # BLD: 0.02 K/UL (ref 0–0.12)
BILIRUB SERPL-MCNC: 0.5 MG/DL (ref 0.1–1.5)
BUN SERPL-MCNC: 38 MG/DL (ref 8–22)
CALCIUM SERPL-MCNC: 8.9 MG/DL (ref 8.5–10.5)
CHLORIDE SERPL-SCNC: 108 MMOL/L (ref 96–112)
CO2 SERPL-SCNC: 24 MMOL/L (ref 20–33)
CREAT SERPL-MCNC: 0.95 MG/DL (ref 0.5–1.4)
EOSINOPHIL # BLD AUTO: 0.1 K/UL (ref 0–0.51)
EOSINOPHIL NFR BLD: 1.5 % (ref 0–6.9)
ERYTHROCYTE [DISTWIDTH] IN BLOOD BY AUTOMATED COUNT: 49.1 FL (ref 35.9–50)
GLOBULIN SER CALC-MCNC: 2.6 G/DL (ref 1.9–3.5)
GLUCOSE SERPL-MCNC: 109 MG/DL (ref 65–99)
HCT VFR BLD AUTO: 35.4 % (ref 42–52)
HGB BLD-MCNC: 11.6 G/DL (ref 14–18)
IMM GRANULOCYTES # BLD AUTO: 0.02 K/UL (ref 0–0.11)
IMM GRANULOCYTES NFR BLD AUTO: 0.3 % (ref 0–0.9)
LYMPHOCYTES # BLD AUTO: 1.01 K/UL (ref 1–4.8)
LYMPHOCYTES NFR BLD: 15.1 % (ref 22–41)
MCH RBC QN AUTO: 30.7 PG (ref 27–33)
MCHC RBC AUTO-ENTMCNC: 32.8 G/DL (ref 33.7–35.3)
MCV RBC AUTO: 93.7 FL (ref 81.4–97.8)
MONOCYTES # BLD AUTO: 0.75 K/UL (ref 0–0.85)
MONOCYTES NFR BLD AUTO: 11.2 % (ref 0–13.4)
NEUTROPHILS # BLD AUTO: 4.77 K/UL (ref 1.82–7.42)
NEUTROPHILS NFR BLD: 71.6 % (ref 44–72)
NRBC # BLD AUTO: 0 K/UL
NRBC BLD-RTO: 0 /100 WBC
PLATELET # BLD AUTO: 168 K/UL (ref 164–446)
PMV BLD AUTO: 9.8 FL (ref 9–12.9)
POTASSIUM SERPL-SCNC: 4.2 MMOL/L (ref 3.6–5.5)
PROT SERPL-MCNC: 6.2 G/DL (ref 6–8.2)
RBC # BLD AUTO: 3.78 M/UL (ref 4.7–6.1)
SODIUM SERPL-SCNC: 135 MMOL/L (ref 135–145)
WBC # BLD AUTO: 6.7 K/UL (ref 4.8–10.8)

## 2018-05-30 PROCEDURE — 99214 OFFICE O/P EST MOD 30 MIN: CPT | Performed by: INTERNAL MEDICINE

## 2018-05-30 PROCEDURE — 665999 HH PPS REVENUE DEBIT

## 2018-05-30 PROCEDURE — 80053 COMPREHEN METABOLIC PANEL: CPT

## 2018-05-30 PROCEDURE — 85025 COMPLETE CBC W/AUTO DIFF WBC: CPT

## 2018-05-30 PROCEDURE — 665998 HH PPS REVENUE CREDIT

## 2018-05-30 PROCEDURE — 36415 COLL VENOUS BLD VENIPUNCTURE: CPT

## 2018-05-30 RX ORDER — LOSARTAN POTASSIUM 50 MG/1
50 TABLET ORAL DAILY
Qty: 90 TAB | Refills: 3 | Status: SHIPPED | OUTPATIENT
Start: 2018-05-30 | End: 2019-03-13

## 2018-05-30 ASSESSMENT — ENCOUNTER SYMPTOMS
NAUSEA: DENIES
RESPIRATORY SYMPTOMS COMMENTS: DENIES SOB RESP EVEN AND UNLABORED

## 2018-05-30 NOTE — PROGRESS NOTES
CC: Follow-up hospitalization    HPI:   Donato presents today with the following.    1. Cholangitis  Presents after being hospitalized with obstructed biliary duct.  Procedure was performed to remove the stone and patient reports he is feeling significantly better.  He also was found to have a urinary tract infection treated with antibiotics.  He is denying any fevers chills no night sweats.  He reports he is feeling back to baseline.    2. Essential hypertension  Blood pressure was elevated in the hospital medication dosage was increased he is tolerating well without dizziness.    3. Anemia, unspecified type  Was found to be slightly anemic while in the hospital last blood work a month ago.  Denying any blood in stool or black stools    4. Debility  Patient currently not driving he would like to drive again however there is concerns about his cognition and driving as well as reflexes.      Patient Active Problem List    Diagnosis Date Noted   • Near syncope 02/20/2018     Priority: High   • Cholangitis 02/09/2016     Priority: High   • Hypokalemia 04/18/2018     Priority: Medium   • Coronary artery disease involving native heart without angina pectoris 03/07/2018     Priority: Medium   • LFTs abnormal 12/06/2017     Priority: Medium   • Essential hypertension 01/26/2015     Priority: Medium   • Weakness 04/17/2018     Priority: Low   • History of gallbladder cancer 11/09/2017     Priority: Low   • Anemia 02/09/2016     Priority: Low   • Dyslipidemia 01/26/2015     Priority: Low   • Appetite loss 03/07/2018   • History of non-ST elevation myocardial infarction (NSTEMI) 02/21/2018   • Benign essential tremor 11/03/2016   • IGT (impaired glucose tolerance) 11/30/2015   • Benign non-nodular prostatic hyperplasia with lower urinary tract symptoms 08/03/2015       Current Outpatient Prescriptions   Medication Sig Dispense Refill   • losartan (COZAAR) 50 MG Tab Take 1 Tab by mouth every day. 90 Tab 3   • atorvastatin  "(LIPITOR) 10 MG Tab Take 1 Tab by mouth every evening. 90 Tab 3   • clopidogrel (PLAVIX) 75 MG Tab Take 1 Tab by mouth every day. 90 Tab 3   • metoprolol SR (TOPROL XL) 25 MG TABLET SR 24 HR Take 0.5 Tabs by mouth every day. 90 Tab 3   • potassium chloride SA (KDUR) 20 MEQ Tab CR Take 1 Tab by mouth every day. 90 Tab 3   • megestrol (MEGACE) 20 MG Tab Take 1 Tab by mouth every day. 90 Tab 3   • terazosin (HYTRIN) 5 MG Cap Take 1 Cap by mouth every day. 90 Cap 3   • aspirin EC 81 MG EC tablet Take 1 Tab by mouth every day. 30 Tab 3   • MAGNESIUM PO Take 250 mg by mouth 1 time daily as needed (supplement).     • Vitamin D, Cholecalciferol, 1000 UNITS Cap Take 2,000 Units by mouth every day at 6 PM.       No current facility-administered medications for this visit.          Allergies as of 05/30/2018   • (No Known Allergies)        ROS: Denies Chest pain, SOB, LE edema.    /60   Pulse 81   Temp 37.4 °C (99.3 °F)   Resp 16   Ht 1.727 m (5' 8\")   Wt 62.1 kg (137 lb)   SpO2 96%   BMI 20.83 kg/m²     Physical Exam:  Gen:         Alert and oriented, No apparent distress.  Neck:        No Lymphadenopathy or Bruits.  Lungs:     Clear to auscultation bilaterally  CV:          Regular rate and rhythm. No murmurs, rubs or gallops.               Ext:          No clubbing, cyanosis, edema.      Assessment and Plan.   89 y.o. male with the following issues.    1. Cholangitis  Clinically looks like he is doing quite well have sent for repeat blood work.  - COMP METABOLIC PANEL; Future  - CBC WITH DIFFERENTIAL; Future    2. Essential hypertension  Refilled the higher dose of medication which he seems to be tolerating well losartan 50 mg.    3. Anemia, unspecified type  Recheck CBC    4. Debility  Have sent for 's evaluation not terribly optimistic that he is going to pass.  - REFERRAL TO OCCUPATIONAL THERAPY Reason for Therapy: Eval/Treat/Report      "

## 2018-05-31 ENCOUNTER — TELEPHONE (OUTPATIENT)
Dept: CARDIOLOGY | Facility: MEDICAL CENTER | Age: 83
End: 2018-05-31

## 2018-05-31 PROCEDURE — 665999 HH PPS REVENUE DEBIT

## 2018-05-31 PROCEDURE — 665998 HH PPS REVENUE CREDIT

## 2018-05-31 NOTE — TELEPHONE ENCOUNTER
Call received from patient's dental office, he is there and they are asking if he needs SBE prophylaxis.  He had a PTCA, no stent in March and from this standpoint I see no indication for pre meds, however he was recently hospitalized for sepsis.  To JASMIN WATSON to please advise.

## 2018-05-31 NOTE — TELEPHONE ENCOUNTER
ROSALINDA Moya R.N.   Caller: Unspecified (Today,  1:23 PM)             Pt may undergo dental cleaning or procedures.      Letter created and faxed to [facility] [phone].

## 2018-06-01 PROCEDURE — 665998 HH PPS REVENUE CREDIT

## 2018-06-01 PROCEDURE — 665999 HH PPS REVENUE DEBIT

## 2018-06-02 PROCEDURE — 665998 HH PPS REVENUE CREDIT

## 2018-06-02 PROCEDURE — 665999 HH PPS REVENUE DEBIT

## 2018-06-03 PROCEDURE — 665998 HH PPS REVENUE CREDIT

## 2018-06-03 PROCEDURE — 665999 HH PPS REVENUE DEBIT

## 2018-06-04 PROCEDURE — 665999 HH PPS REVENUE DEBIT

## 2018-06-04 PROCEDURE — 665998 HH PPS REVENUE CREDIT

## 2018-06-05 ENCOUNTER — HOME CARE VISIT (OUTPATIENT)
Dept: HOME HEALTH SERVICES | Facility: HOME HEALTHCARE | Age: 83
End: 2018-06-05
Payer: MEDICARE

## 2018-06-05 PROCEDURE — 665998 HH PPS REVENUE CREDIT

## 2018-06-05 PROCEDURE — G0299 HHS/HOSPICE OF RN EA 15 MIN: HCPCS

## 2018-06-05 PROCEDURE — 665999 HH PPS REVENUE DEBIT

## 2018-06-06 PROCEDURE — 665998 HH PPS REVENUE CREDIT

## 2018-06-06 PROCEDURE — 665999 HH PPS REVENUE DEBIT

## 2018-06-07 ENCOUNTER — HOME CARE VISIT (OUTPATIENT)
Dept: HOME HEALTH SERVICES | Facility: HOME HEALTHCARE | Age: 83
End: 2018-06-07
Payer: MEDICARE

## 2018-06-07 VITALS
OXYGEN SATURATION: 99 % | SYSTOLIC BLOOD PRESSURE: 104 MMHG | RESPIRATION RATE: 16 BRPM | TEMPERATURE: 99 F | HEART RATE: 88 BPM | DIASTOLIC BLOOD PRESSURE: 56 MMHG

## 2018-06-07 VITALS
SYSTOLIC BLOOD PRESSURE: 108 MMHG | OXYGEN SATURATION: 99 % | DIASTOLIC BLOOD PRESSURE: 54 MMHG | TEMPERATURE: 99.7 F | RESPIRATION RATE: 16 BRPM | HEART RATE: 86 BPM

## 2018-06-07 PROCEDURE — 665998 HH PPS REVENUE CREDIT

## 2018-06-07 PROCEDURE — 665997 HH PPS REVENUE ADJ

## 2018-06-07 PROCEDURE — G0493 RN CARE EA 15 MIN HH/HOSPICE: HCPCS

## 2018-06-07 PROCEDURE — 665999 HH PPS REVENUE DEBIT

## 2018-06-07 SDOH — ECONOMIC STABILITY: HOUSING INSECURITY: UNSAFE APPLIANCES: 0

## 2018-06-07 SDOH — ECONOMIC STABILITY: HOUSING INSECURITY: UNSAFE COOKING RANGE AREA: 0

## 2018-06-07 ASSESSMENT — ENCOUNTER SYMPTOMS: NAUSEA: DENIES

## 2018-06-07 ASSESSMENT — ACTIVITIES OF DAILY LIVING (ADL)
OASIS_M1830: 01
HOME_HEALTH_OASIS: 01

## 2018-06-08 PROCEDURE — 665998 HH PPS REVENUE CREDIT

## 2018-06-08 PROCEDURE — 665999 HH PPS REVENUE DEBIT

## 2018-06-09 PROCEDURE — 665998 HH PPS REVENUE CREDIT

## 2018-06-09 PROCEDURE — 665999 HH PPS REVENUE DEBIT

## 2018-06-10 PROCEDURE — 665999 HH PPS REVENUE DEBIT

## 2018-06-10 PROCEDURE — 665998 HH PPS REVENUE CREDIT

## 2018-06-11 ENCOUNTER — OFFICE VISIT (OUTPATIENT)
Dept: CARDIOLOGY | Facility: MEDICAL CENTER | Age: 83
End: 2018-06-11
Payer: MEDICARE

## 2018-06-11 VITALS
DIASTOLIC BLOOD PRESSURE: 60 MMHG | BODY MASS INDEX: 20.31 KG/M2 | OXYGEN SATURATION: 95 % | RESPIRATION RATE: 14 BRPM | WEIGHT: 134 LBS | HEIGHT: 68 IN | SYSTOLIC BLOOD PRESSURE: 148 MMHG | HEART RATE: 108 BPM

## 2018-06-11 DIAGNOSIS — I10 ESSENTIAL HYPERTENSION: ICD-10-CM

## 2018-06-11 DIAGNOSIS — I25.2 HISTORY OF NON-ST ELEVATION MYOCARDIAL INFARCTION (NSTEMI): ICD-10-CM

## 2018-06-11 DIAGNOSIS — I25.10 CORONARY ARTERY DISEASE INVOLVING NATIVE CORONARY ARTERY OF NATIVE HEART WITHOUT ANGINA PECTORIS: ICD-10-CM

## 2018-06-11 DIAGNOSIS — R73.02 IGT (IMPAIRED GLUCOSE TOLERANCE): ICD-10-CM

## 2018-06-11 PROCEDURE — 99214 OFFICE O/P EST MOD 30 MIN: CPT | Performed by: INTERNAL MEDICINE

## 2018-06-11 PROCEDURE — 665998 HH PPS REVENUE CREDIT

## 2018-06-11 PROCEDURE — 665999 HH PPS REVENUE DEBIT

## 2018-06-11 RX ORDER — METOPROLOL SUCCINATE 25 MG/1
25 TABLET, EXTENDED RELEASE ORAL DAILY
Qty: 90 TAB | Refills: 3 | Status: SHIPPED | OUTPATIENT
Start: 2018-06-11 | End: 2019-06-07 | Stop reason: SDUPTHER

## 2018-06-11 NOTE — PROGRESS NOTES
"Chief Complaint   Patient presents with   • Coronary Artery Disease       Subjective:   Donaot Burciaga is a 89 y.o. male who presents today for follow-up of coronary artery disease.  Has a history of hyperlipidemia, hypertension and a non-ST elevation myocardial infarction with a LAD culprit lesion that received PTCA without the ability to place a stent due to heavy calcification.  In the interim he has had no further chest pain and feels well.  His ejection fraction is preserved.  Tolerating medical therapy well.  Did develop ascending cholangitis and was recently released from the hospital last month.    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   • Cataract    • Dental disorder     dentures- full set   • High cholesterol    • History of chemotherapy last dose 9/2016   • History of radiation therapy last done on 09/28/2016   • Hyperlipidemia, mixed 1990   • Hypertension    • MI (myocardial infarction) (HCC)    • Snoring      Past Surgical History:   Procedure Laterality Date   • ERCP IN OR  4/19/2018    Procedure: ERCP IN OR W/METAL STENT PLACEMENT;  Surgeon: Everett Blackwell M.D.;  Location: [facility];  Service: Gastroenterology   • CARDIAC CATH  02/21/2018    PTCA to 98% LAD, dilated to 40%, no stent.   • ERCP W/REM FB AND/OR STENT CHG N/A 12/5/2016    Procedure: ERCP W/REM FB AND/OR STENT CHG - PYLORIC STENT REMOVAL SWAP METAL STENT PLACEMENT;  Surgeon: Sunny Grove M.D.;  Location: [facility];  Service:    • GASTROSCOPY-ENDO N/A 7/11/2016    Procedure: GASTROSCOPY-ENDO;  Surgeon: Sunny Gorve M.D.;  Location: [facility];  Service:    • EGD W/ENDOSCOPIC ULTRASOUND N/A 7/11/2016    Procedure: EGD W/ENDOSCOPIC ULTRASOUND - UPPER LINEAR;  Surgeon: Sunny Grove M.D.;  Location: [facility];  Service:    • ERCP W/REM FB AND/OR STENT CHG N/A " 7/11/2016    Procedure: ERCP W/REM FB AND/OR STENT CHG - STENT EXCHANGE;  Surgeon: Sunny Grove M.D.;  Location: [facility];  Service:    • DIAMANTE BY LAPAROSCOPY  2/10/2016    Procedure: DIAMANTE BY LAPAROSCOPY;  Surgeon: Elver Turcios M.D.;  Location: [facility];  Service:    • ERCP IN OR N/A 2/8/2016    Procedure: ERCP IN OR;  Surgeon: Sunny Grove M.D.;  Location: [facility];  Service:      Family History   Problem Relation Age of Onset   • No Known Problems Mother      Social History     Social History   • Marital status:      Spouse name: N/A   • Number of children: N/A   • Years of education: N/A     Occupational History   • Not on file.     Social History Main Topics   • Smoking status: Former Smoker     Years: 0.00     Types: Cigarettes     Quit date: 1/1/1960   • Smokeless tobacco: Never Used   • Alcohol use No   • Drug use: No   • Sexual activity: Not Currently     Partners: Female     Other Topics Concern   • Not on file     Social History Narrative   • No narrative on file     No Known Allergies  Outpatient Encounter Prescriptions as of 6/11/2018   Medication Sig Dispense Refill   • metoprolol SR (TOPROL XL) 25 MG TABLET SR 24 HR Take 1 Tab by mouth every day. 90 Tab 3   • losartan (COZAAR) 50 MG Tab Take 1 Tab by mouth every day. 90 Tab 3   • atorvastatin (LIPITOR) 10 MG Tab Take 1 Tab by mouth every evening. 90 Tab 3   • clopidogrel (PLAVIX) 75 MG Tab Take 1 Tab by mouth every day. 90 Tab 3   • potassium chloride SA (KDUR) 20 MEQ Tab CR Take 1 Tab by mouth every day. 90 Tab 3   • megestrol (MEGACE) 20 MG Tab Take 1 Tab by mouth every day. 90 Tab 3   • terazosin (HYTRIN) 5 MG Cap Take 1 Cap by mouth every day. 90 Cap 3   • aspirin EC 81 MG EC tablet Take 1 Tab by mouth every day. (Patient taking differently: Take 81 mg by mouth every evening.) 30 Tab 3   • MAGNESIUM PO Take 250 mg by mouth 1 time daily as needed (supplement).     • Vitamin D, Cholecalciferol,  "1000 UNITS Cap Take 2,000 Units by mouth every day at 6 PM.     • [DISCONTINUED] metoprolol SR (TOPROL XL) 25 MG TABLET SR 24 HR Take 0.5 Tabs by mouth every day. 90 Tab 3     No facility-administered encounter medications on file as of 6/11/2018.      Review of Systems   All other systems reviewed and are negative.       Objective:   /60   Pulse (!) 108   Resp 14   Ht 1.727 m (5' 8\")   Wt 60.8 kg (134 lb)   SpO2 95%   BMI 20.37 kg/m²     Physical Exam   Constitutional: He is oriented to person, place, and time. He appears well-developed and well-nourished. No distress.   Elderly and frail  Very slow get up and go   HENT:   Head: Normocephalic and atraumatic.   Right Ear: External ear normal.   Left Ear: External ear normal.   Eyes: Conjunctivae and EOM are normal. Pupils are equal, round, and reactive to light. Right eye exhibits no discharge. Left eye exhibits no discharge. No scleral icterus.   Neck: Normal range of motion. Neck supple. No JVD present. No tracheal deviation present. No thyromegaly present.   Cardiovascular: Normal rate, regular rhythm and intact distal pulses.  PMI is not displaced.  Exam reveals no gallop and no friction rub.    Murmur ( 3/6 systolic ejection murmur right upper sternal border) heard.  Pulses:       Carotid pulses are 2+ on the right side, and 2+ on the left side.       Radial pulses are 2+ on the left side.        Popliteal pulses are 2+ on the right side, and 2+ on the left side.        Dorsalis pedis pulses are 2+ on the right side, and 2+ on the left side.        Posterior tibial pulses are 2+ on the right side, and 2+ on the left side.   Pulmonary/Chest: Effort normal and breath sounds normal. No respiratory distress. He has no wheezes. He has no rales. He exhibits no tenderness.   Abdominal: Soft. Bowel sounds are normal. He exhibits no distension. There is no tenderness.   Musculoskeletal: Normal range of motion. He exhibits no edema, tenderness or deformity. "   Neurological: He is alert and oriented to person, place, and time. No cranial nerve deficit (cranial nerves II through XII grossly intact). Coordination normal.   Left arm tremor   Skin: Skin is warm and dry. No rash noted. He is not diaphoretic. No erythema. No pallor.   Psychiatric: He has a normal mood and affect. His behavior is normal. Thought content normal.   Vitals reviewed.  Imaging reviewed and discussed as above with the patient.  LABS:  Lab Results   Component Value Date/Time    CHOLSTRLTOT 147 11/01/2017 09:30 AM    LDL 76 11/01/2017 09:30 AM    HDL 58 11/01/2017 09:30 AM    TRIGLYCERIDE 66 11/01/2017 09:30 AM       Lab Results   Component Value Date/Time    WBC 6.7 05/30/2018 04:43 PM    RBC 3.78 (L) 05/30/2018 04:43 PM    HEMOGLOBIN 11.6 (L) 05/30/2018 04:43 PM    HEMATOCRIT 35.4 (L) 05/30/2018 04:43 PM    MCV 93.7 05/30/2018 04:43 PM    NEUTSPOLYS 71.60 05/30/2018 04:43 PM    LYMPHOCYTES 15.10 (L) 05/30/2018 04:43 PM    MONOCYTES 11.20 05/30/2018 04:43 PM    EOSINOPHILS 1.50 05/30/2018 04:43 PM    BASOPHILS 0.30 05/30/2018 04:43 PM     Lab Results   Component Value Date/Time    SODIUM 135 05/30/2018 04:43 PM    POTASSIUM 4.2 05/30/2018 04:43 PM    CHLORIDE 108 05/30/2018 04:43 PM    CO2 24 05/30/2018 04:43 PM    GLUCOSE 109 (H) 05/30/2018 04:43 PM    BUN 38 (H) 05/30/2018 04:43 PM    CREATININE 0.95 05/30/2018 04:43 PM    BUNCREATRAT 25 (H) 11/01/2017 09:30 AM     Lab Results   Component Value Date    HBA1C 5.5 11/01/2017      Lab Results   Component Value Date/Time    ALKPHOSPHAT 97 05/30/2018 04:43 PM    ASTSGOT 33 05/30/2018 04:43 PM    ALTSGPT 47 05/30/2018 04:43 PM    TBILIRUBIN 0.5 05/30/2018 04:43 PM      No results found for: BNPBTYPENAT   No results found for: TSH  Lab Results   Component Value Date/Time    PROTHROMBTM 16.8 (H) 04/17/2018 04:51 AM    INR 1.39 (H) 04/17/2018 04:51 AM          Assessment:     1. Coronary artery disease involving native coronary artery of native heart without  angina pectoris  metoprolol SR (TOPROL XL) 25 MG TABLET SR 24 HR   2. Essential hypertension     3. History of non-ST elevation myocardial infarction (NSTEMI)     4. IGT (impaired glucose tolerance)         Medical Decision Making:  Today's Assessment / Status / Plan:     Discussed that if he should develop recurrent angina refractory to medical therapy rotational atherectomy would be the procedure of choice to assist in revascularization of his LAD stenosis.  He did have a good PTCA result with moderate residual stenosis that is currently asymptomatic.  I think increasing his Toprol to 25 mg daily from 12.5 mg daily is reasonable next step to help stave off any symptoms.  Currently he is asymptomatic.

## 2018-06-12 PROCEDURE — 665999 HH PPS REVENUE DEBIT

## 2018-06-12 PROCEDURE — 665998 HH PPS REVENUE CREDIT

## 2018-06-13 PROCEDURE — 665998 HH PPS REVENUE CREDIT

## 2018-06-13 PROCEDURE — 665999 HH PPS REVENUE DEBIT

## 2018-06-14 PROCEDURE — 665998 HH PPS REVENUE CREDIT

## 2018-06-14 PROCEDURE — 665999 HH PPS REVENUE DEBIT

## 2018-06-15 PROCEDURE — 665999 HH PPS REVENUE DEBIT

## 2018-06-15 PROCEDURE — 665998 HH PPS REVENUE CREDIT

## 2018-06-16 PROCEDURE — 665999 HH PPS REVENUE DEBIT

## 2018-06-16 PROCEDURE — 665998 HH PPS REVENUE CREDIT

## 2018-06-17 PROCEDURE — 665998 HH PPS REVENUE CREDIT

## 2018-06-17 PROCEDURE — 665999 HH PPS REVENUE DEBIT

## 2018-06-18 PROCEDURE — 665998 HH PPS REVENUE CREDIT

## 2018-06-18 PROCEDURE — 665999 HH PPS REVENUE DEBIT

## 2018-06-19 PROCEDURE — 665998 HH PPS REVENUE CREDIT

## 2018-06-19 PROCEDURE — 665999 HH PPS REVENUE DEBIT

## 2018-06-20 PROCEDURE — 665999 HH PPS REVENUE DEBIT

## 2018-06-20 PROCEDURE — 665998 HH PPS REVENUE CREDIT

## 2018-06-21 PROCEDURE — 665997 HH PPS REVENUE ADJ

## 2018-06-21 PROCEDURE — 665999 HH PPS REVENUE DEBIT

## 2018-06-21 PROCEDURE — 665998 HH PPS REVENUE CREDIT

## 2018-06-26 ENCOUNTER — OCCUPATIONAL THERAPY (OUTPATIENT)
Dept: OCCUPATIONAL THERAPY | Facility: REHABILITATION | Age: 83
End: 2018-06-26
Attending: INTERNAL MEDICINE
Payer: MEDICARE

## 2018-06-26 DIAGNOSIS — R53.81 DEBILITY: ICD-10-CM

## 2018-06-26 PROCEDURE — G8990 OTHER PT/OT CURRENT STATUS: HCPCS | Mod: CI

## 2018-06-26 PROCEDURE — 97750 PHYSICAL PERFORMANCE TEST: CPT

## 2018-06-26 PROCEDURE — G8991 OTHER PT/OT GOAL STATUS: HCPCS | Mod: CI

## 2018-06-26 PROCEDURE — G8992 OTHER PT/OT  D/C STATUS: HCPCS | Mod: CI

## 2018-06-26 ASSESSMENT — BALANCE ASSESSMENTS
BALANCE - SITTING DYNAMIC: GOOD
BALANCE - SITTING STATIC: GOOD
BALANCE - STANDING STATIC: FAIR +
BALANCE - STANDING DYNAMIC: FAIR +
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
## 2018-07-11 ENCOUNTER — OFFICE VISIT (OUTPATIENT)
Dept: MEDICAL GROUP | Facility: MEDICAL CENTER | Age: 83
End: 2018-07-11
Payer: MEDICARE

## 2018-07-11 VITALS
WEIGHT: 135 LBS | BODY MASS INDEX: 20.46 KG/M2 | TEMPERATURE: 100 F | HEIGHT: 68 IN | RESPIRATION RATE: 16 BRPM | DIASTOLIC BLOOD PRESSURE: 52 MMHG | OXYGEN SATURATION: 99 % | HEART RATE: 83 BPM | SYSTOLIC BLOOD PRESSURE: 102 MMHG

## 2018-07-11 DIAGNOSIS — R63.4 WEIGHT LOSS: ICD-10-CM

## 2018-07-11 DIAGNOSIS — I10 ESSENTIAL HYPERTENSION: ICD-10-CM

## 2018-07-11 DIAGNOSIS — R53.1 WEAKNESS: ICD-10-CM

## 2018-07-11 PROCEDURE — 99214 OFFICE O/P EST MOD 30 MIN: CPT | Performed by: INTERNAL MEDICINE

## 2018-07-11 NOTE — PROGRESS NOTES
CC: Weight loss, fall risk, hypertension.    HPI:   Donato presents today with the following.    1. Weight loss  Presents after being started on appetite stimulant he has gained a pound in the last month.  He reports his appetite is improved slightly.  Wife is quite concerned because she cannot get him to drink enough water he reports he does.    2. Essential hypertension  Blood pressure is on the low end today but well controlled in general denying any current dizziness.    3. Weakness  He is quite frail did have a ground-level fall where he had a call neighbors to help him up.  Wife is no longer physically able to care for him as well as herself.  She does okay with her own ADLs but does not have the strength to take care of him.  They do not have family here in town and refused to move to another state where their family is.  They also remove any other living arrangement.  Wife is trying to set up home cleaning services but again it is not care for him.      Patient Active Problem List    Diagnosis Date Noted   • Near syncope 02/20/2018     Priority: High   • Cholangitis 02/09/2016     Priority: High   • Hypokalemia 04/18/2018     Priority: Medium   • Coronary artery disease involving native heart without angina pectoris 03/07/2018     Priority: Medium   • LFTs abnormal 12/06/2017     Priority: Medium   • Essential hypertension 01/26/2015     Priority: Medium   • Weakness 04/17/2018     Priority: Low   • History of gallbladder cancer 11/09/2017     Priority: Low   • Anemia 02/09/2016     Priority: Low   • Dyslipidemia 01/26/2015     Priority: Low   • Appetite loss 03/07/2018   • History of non-ST elevation myocardial infarction (NSTEMI) 02/21/2018   • Benign essential tremor 11/03/2016   • IGT (impaired glucose tolerance) 11/30/2015   • Benign non-nodular prostatic hyperplasia with lower urinary tract symptoms 08/03/2015       Current Outpatient Prescriptions   Medication Sig Dispense Refill   • metoprolol SR  "(TOPROL XL) 25 MG TABLET SR 24 HR Take 1 Tab by mouth every day. 90 Tab 3   • losartan (COZAAR) 50 MG Tab Take 1 Tab by mouth every day. 90 Tab 3   • atorvastatin (LIPITOR) 10 MG Tab Take 1 Tab by mouth every evening. 90 Tab 3   • clopidogrel (PLAVIX) 75 MG Tab Take 1 Tab by mouth every day. 90 Tab 3   • potassium chloride SA (KDUR) 20 MEQ Tab CR Take 1 Tab by mouth every day. 90 Tab 3   • megestrol (MEGACE) 20 MG Tab Take 1 Tab by mouth every day. 90 Tab 3   • terazosin (HYTRIN) 5 MG Cap Take 1 Cap by mouth every day. 90 Cap 3   • aspirin EC 81 MG EC tablet Take 1 Tab by mouth every day. (Patient taking differently: Take 81 mg by mouth every evening.) 30 Tab 3   • MAGNESIUM PO Take 250 mg by mouth 1 time daily as needed (supplement).     • Vitamin D, Cholecalciferol, 1000 UNITS Cap Take 2,000 Units by mouth every day at 6 PM.       No current facility-administered medications for this visit.          Allergies as of 07/11/2018   • (No Known Allergies)        ROS: Denies Chest pain, SOB, LE edema.    /52   Pulse 83   Temp 37.8 °C (100 °F)   Resp 16   Ht 1.727 m (5' 8\")   Wt 61.2 kg (135 lb)   SpO2 99%   BMI 20.53 kg/m²     Physical Exam:  Gen:         Alert and oriented, No apparent distress.  Neck:        No Lymphadenopathy or Bruits.  Lungs:     Clear to auscultation bilaterally  CV:          Regular rate and rhythm. No murmurs, rubs or gallops.               Ext:          No clubbing, cyanosis, edema.      Assessment and Plan.   89 y.o. male with the following issues.    1. Weight loss  Weight has remained stable on current medication no changes    2. Essential hypertension  HTN caution about if he should have dizziness to follow-up.    3. Weakness  Patient is at risk in the current living environment both of them refused to have alternative living arrangements.  Wife reports that he will not listen to her which she will not listen to consider moving to another facility for his sake.  Have " recommended physical therapy to build up strength which he declines also suggested a walker which she already has but does not like to use.

## 2018-07-30 ENCOUNTER — APPOINTMENT (OUTPATIENT)
Dept: RADIOLOGY | Facility: MEDICAL CENTER | Age: 83
DRG: 920 | End: 2018-07-30
Attending: EMERGENCY MEDICINE
Payer: MEDICARE

## 2018-07-30 ENCOUNTER — HOSPITAL ENCOUNTER (INPATIENT)
Facility: MEDICAL CENTER | Age: 83
LOS: 4 days | DRG: 920 | End: 2018-08-04
Attending: EMERGENCY MEDICINE | Admitting: HOSPITALIST
Payer: MEDICARE

## 2018-07-30 DIAGNOSIS — K83.8 PNEUMOBILIA: ICD-10-CM

## 2018-07-30 LAB
ALBUMIN SERPL BCP-MCNC: 3.4 G/DL (ref 3.2–4.9)
ALBUMIN/GLOB SERPL: 1.5 G/DL
ALP SERPL-CCNC: 462 U/L (ref 30–99)
ALT SERPL-CCNC: 222 U/L (ref 2–50)
ANION GAP SERPL CALC-SCNC: 13 MMOL/L (ref 0–11.9)
APPEARANCE UR: CLEAR
APTT PPP: 24.5 SEC (ref 24.7–36)
AST SERPL-CCNC: 228 U/L (ref 12–45)
BACTERIA #/AREA URNS HPF: NEGATIVE /HPF
BASOPHILS # BLD AUTO: 0.1 % (ref 0–1.8)
BASOPHILS # BLD: 0.01 K/UL (ref 0–0.12)
BILIRUB SERPL-MCNC: 2.5 MG/DL (ref 0.1–1.5)
BILIRUB UR QL STRIP.AUTO: ABNORMAL
BNP SERPL-MCNC: 62 PG/ML (ref 0–100)
BUN SERPL-MCNC: 34 MG/DL (ref 8–22)
CALCIUM SERPL-MCNC: 8.8 MG/DL (ref 8.5–10.5)
CHLORIDE SERPL-SCNC: 104 MMOL/L (ref 96–112)
CO2 SERPL-SCNC: 19 MMOL/L (ref 20–33)
COLOR UR: ABNORMAL
CREAT SERPL-MCNC: 1.01 MG/DL (ref 0.5–1.4)
EOSINOPHIL # BLD AUTO: 0.01 K/UL (ref 0–0.51)
EOSINOPHIL NFR BLD: 0.1 % (ref 0–6.9)
EPI CELLS #/AREA URNS HPF: NEGATIVE /HPF
ERYTHROCYTE [DISTWIDTH] IN BLOOD BY AUTOMATED COUNT: 47.6 FL (ref 35.9–50)
GLOBULIN SER CALC-MCNC: 2.3 G/DL (ref 1.9–3.5)
GLUCOSE SERPL-MCNC: 156 MG/DL (ref 65–99)
GLUCOSE UR STRIP.AUTO-MCNC: NEGATIVE MG/DL
HCT VFR BLD AUTO: 32.4 % (ref 42–52)
HGB BLD-MCNC: 10.9 G/DL (ref 14–18)
HYALINE CASTS #/AREA URNS LPF: ABNORMAL /LPF
IMM GRANULOCYTES # BLD AUTO: 0.09 K/UL (ref 0–0.11)
IMM GRANULOCYTES NFR BLD AUTO: 0.7 % (ref 0–0.9)
INR PPP: 1.11 (ref 0.87–1.13)
KETONES UR STRIP.AUTO-MCNC: ABNORMAL MG/DL
LACTATE BLD-SCNC: 2 MMOL/L (ref 0.5–2)
LEUKOCYTE ESTERASE UR QL STRIP.AUTO: ABNORMAL
LIPASE SERPL-CCNC: 52 U/L (ref 11–82)
LYMPHOCYTES # BLD AUTO: 0.16 K/UL (ref 1–4.8)
LYMPHOCYTES NFR BLD: 1.3 % (ref 22–41)
MCH RBC QN AUTO: 30.7 PG (ref 27–33)
MCHC RBC AUTO-ENTMCNC: 33.6 G/DL (ref 33.7–35.3)
MCV RBC AUTO: 91.3 FL (ref 81.4–97.8)
MICRO URNS: ABNORMAL
MONOCYTES # BLD AUTO: 1.01 K/UL (ref 0–0.85)
MONOCYTES NFR BLD AUTO: 8.1 % (ref 0–13.4)
NEUTROPHILS # BLD AUTO: 11.18 K/UL (ref 1.82–7.42)
NEUTROPHILS NFR BLD: 89.7 % (ref 44–72)
NITRITE UR QL STRIP.AUTO: NEGATIVE
NRBC # BLD AUTO: 0 K/UL
NRBC BLD-RTO: 0 /100 WBC
PH UR STRIP.AUTO: 5 [PH]
PLATELET # BLD AUTO: 171 K/UL (ref 164–446)
PMV BLD AUTO: 10 FL (ref 9–12.9)
POTASSIUM SERPL-SCNC: 3.9 MMOL/L (ref 3.6–5.5)
PROT SERPL-MCNC: 5.7 G/DL (ref 6–8.2)
PROT UR QL STRIP: NEGATIVE MG/DL
PROTHROMBIN TIME: 14 SEC (ref 12–14.6)
RBC # BLD AUTO: 3.55 M/UL (ref 4.7–6.1)
RBC # URNS HPF: ABNORMAL /HPF
RBC UR QL AUTO: NEGATIVE
SODIUM SERPL-SCNC: 136 MMOL/L (ref 135–145)
SP GR UR STRIP.AUTO: 1.03
TROPONIN I SERPL-MCNC: 0.02 NG/ML (ref 0–0.04)
UROBILINOGEN UR STRIP.AUTO-MCNC: 1 MG/DL
WBC # BLD AUTO: 12.5 K/UL (ref 4.8–10.8)
WBC #/AREA URNS HPF: ABNORMAL /HPF

## 2018-07-30 PROCEDURE — 71045 X-RAY EXAM CHEST 1 VIEW: CPT

## 2018-07-30 PROCEDURE — 71275 CT ANGIOGRAPHY CHEST: CPT

## 2018-07-30 PROCEDURE — 700105 HCHG RX REV CODE 258: Performed by: EMERGENCY MEDICINE

## 2018-07-30 PROCEDURE — 83690 ASSAY OF LIPASE: CPT

## 2018-07-30 PROCEDURE — 85610 PROTHROMBIN TIME: CPT

## 2018-07-30 PROCEDURE — 36415 COLL VENOUS BLD VENIPUNCTURE: CPT

## 2018-07-30 PROCEDURE — 85730 THROMBOPLASTIN TIME PARTIAL: CPT

## 2018-07-30 PROCEDURE — 84484 ASSAY OF TROPONIN QUANT: CPT

## 2018-07-30 PROCEDURE — 80053 COMPREHEN METABOLIC PANEL: CPT

## 2018-07-30 PROCEDURE — 99285 EMERGENCY DEPT VISIT HI MDM: CPT

## 2018-07-30 PROCEDURE — 83880 ASSAY OF NATRIURETIC PEPTIDE: CPT

## 2018-07-30 PROCEDURE — 81001 URINALYSIS AUTO W/SCOPE: CPT

## 2018-07-30 PROCEDURE — 93005 ELECTROCARDIOGRAM TRACING: CPT

## 2018-07-30 PROCEDURE — 93005 ELECTROCARDIOGRAM TRACING: CPT | Performed by: EMERGENCY MEDICINE

## 2018-07-30 PROCEDURE — 83605 ASSAY OF LACTIC ACID: CPT

## 2018-07-30 PROCEDURE — 85025 COMPLETE CBC W/AUTO DIFF WBC: CPT

## 2018-07-30 RX ORDER — SODIUM CHLORIDE 9 MG/ML
1000 INJECTION, SOLUTION INTRAVENOUS ONCE
Status: COMPLETED | OUTPATIENT
Start: 2018-07-30 | End: 2018-07-30

## 2018-07-30 RX ADMIN — SODIUM CHLORIDE 1000 ML: 9 INJECTION, SOLUTION INTRAVENOUS at 21:20

## 2018-07-30 ASSESSMENT — LIFESTYLE VARIABLES: DO YOU DRINK ALCOHOL: NO

## 2018-07-30 ASSESSMENT — PAIN SCALES - GENERAL: PAINLEVEL_OUTOF10: 3

## 2018-07-31 ENCOUNTER — APPOINTMENT (OUTPATIENT)
Dept: RADIOLOGY | Facility: MEDICAL CENTER | Age: 83
DRG: 920 | End: 2018-07-31
Attending: EMERGENCY MEDICINE
Payer: MEDICARE

## 2018-07-31 PROBLEM — D64.9 NORMOCYTIC ANEMIA: Status: ACTIVE | Noted: 2018-07-31

## 2018-07-31 PROBLEM — K83.8 PNEUMOBILIA: Status: ACTIVE | Noted: 2018-07-31

## 2018-07-31 LAB
EKG IMPRESSION: NORMAL
INR PPP: 1.21 (ref 0.87–1.13)
LACTATE BLD-SCNC: 1.2 MMOL/L (ref 0.5–2)
PROTHROMBIN TIME: 15 SEC (ref 12–14.6)

## 2018-07-31 PROCEDURE — 700111 HCHG RX REV CODE 636 W/ 250 OVERRIDE (IP): Performed by: EMERGENCY MEDICINE

## 2018-07-31 PROCEDURE — 83605 ASSAY OF LACTIC ACID: CPT

## 2018-07-31 PROCEDURE — A9270 NON-COVERED ITEM OR SERVICE: HCPCS | Performed by: INTERNAL MEDICINE

## 2018-07-31 PROCEDURE — 96365 THER/PROPH/DIAG IV INF INIT: CPT

## 2018-07-31 PROCEDURE — 74177 CT ABD & PELVIS W/CONTRAST: CPT

## 2018-07-31 PROCEDURE — A9270 NON-COVERED ITEM OR SERVICE: HCPCS | Performed by: HOSPITALIST

## 2018-07-31 PROCEDURE — 700117 HCHG RX CONTRAST REV CODE 255: Performed by: EMERGENCY MEDICINE

## 2018-07-31 PROCEDURE — 700105 HCHG RX REV CODE 258: Performed by: EMERGENCY MEDICINE

## 2018-07-31 PROCEDURE — 700105 HCHG RX REV CODE 258: Performed by: HOSPITALIST

## 2018-07-31 PROCEDURE — 700101 HCHG RX REV CODE 250: Performed by: HOSPITALIST

## 2018-07-31 PROCEDURE — 99222 1ST HOSP IP/OBS MODERATE 55: CPT | Mod: AI | Performed by: HOSPITALIST

## 2018-07-31 PROCEDURE — 700102 HCHG RX REV CODE 250 W/ 637 OVERRIDE(OP): Performed by: INTERNAL MEDICINE

## 2018-07-31 PROCEDURE — 85610 PROTHROMBIN TIME: CPT

## 2018-07-31 PROCEDURE — 700102 HCHG RX REV CODE 250 W/ 637 OVERRIDE(OP): Performed by: HOSPITALIST

## 2018-07-31 PROCEDURE — 770006 HCHG ROOM/CARE - MED/SURG/GYN SEMI*

## 2018-07-31 PROCEDURE — 700111 HCHG RX REV CODE 636 W/ 250 OVERRIDE (IP): Performed by: HOSPITALIST

## 2018-07-31 RX ORDER — ONDANSETRON 4 MG/1
4 TABLET, ORALLY DISINTEGRATING ORAL EVERY 4 HOURS PRN
Status: DISCONTINUED | OUTPATIENT
Start: 2018-07-31 | End: 2018-08-04 | Stop reason: HOSPADM

## 2018-07-31 RX ORDER — LOSARTAN POTASSIUM 50 MG/1
50 TABLET ORAL DAILY
Status: DISCONTINUED | OUTPATIENT
Start: 2018-07-31 | End: 2018-08-04 | Stop reason: HOSPADM

## 2018-07-31 RX ORDER — METOPROLOL SUCCINATE 25 MG/1
25 TABLET, EXTENDED RELEASE ORAL DAILY
Status: DISCONTINUED | OUTPATIENT
Start: 2018-07-31 | End: 2018-08-04 | Stop reason: HOSPADM

## 2018-07-31 RX ORDER — SODIUM CHLORIDE 9 MG/ML
INJECTION, SOLUTION INTRAVENOUS CONTINUOUS
Status: DISCONTINUED | OUTPATIENT
Start: 2018-07-31 | End: 2018-08-04 | Stop reason: HOSPADM

## 2018-07-31 RX ORDER — SODIUM CHLORIDE 9 MG/ML
1000 INJECTION, SOLUTION INTRAVENOUS ONCE
Status: COMPLETED | OUTPATIENT
Start: 2018-07-31 | End: 2018-07-31

## 2018-07-31 RX ORDER — POLYETHYLENE GLYCOL 3350 17 G/17G
1 POWDER, FOR SOLUTION ORAL
Status: DISCONTINUED | OUTPATIENT
Start: 2018-07-31 | End: 2018-08-04 | Stop reason: HOSPADM

## 2018-07-31 RX ORDER — ONDANSETRON 2 MG/ML
4 INJECTION INTRAMUSCULAR; INTRAVENOUS EVERY 4 HOURS PRN
Status: DISCONTINUED | OUTPATIENT
Start: 2018-07-31 | End: 2018-08-04 | Stop reason: HOSPADM

## 2018-07-31 RX ORDER — CLOPIDOGREL BISULFATE 75 MG/1
75 TABLET ORAL DAILY
Status: DISCONTINUED | OUTPATIENT
Start: 2018-07-31 | End: 2018-08-01

## 2018-07-31 RX ORDER — ACETAMINOPHEN 325 MG/1
650 TABLET ORAL EVERY 6 HOURS PRN
Status: DISCONTINUED | OUTPATIENT
Start: 2018-07-31 | End: 2018-08-04 | Stop reason: HOSPADM

## 2018-07-31 RX ORDER — BISACODYL 10 MG
10 SUPPOSITORY, RECTAL RECTAL
Status: DISCONTINUED | OUTPATIENT
Start: 2018-07-31 | End: 2018-08-04 | Stop reason: HOSPADM

## 2018-07-31 RX ORDER — METRONIDAZOLE 500 MG/1
500 TABLET ORAL EVERY 8 HOURS
Status: DISCONTINUED | OUTPATIENT
Start: 2018-07-31 | End: 2018-08-04 | Stop reason: HOSPADM

## 2018-07-31 RX ORDER — TERAZOSIN 5 MG/1
5 CAPSULE ORAL DAILY
Status: DISCONTINUED | OUTPATIENT
Start: 2018-07-31 | End: 2018-08-04 | Stop reason: HOSPADM

## 2018-07-31 RX ORDER — ATORVASTATIN CALCIUM 10 MG/1
10 TABLET, FILM COATED ORAL EVERY EVENING
Status: DISCONTINUED | OUTPATIENT
Start: 2018-07-31 | End: 2018-08-04 | Stop reason: HOSPADM

## 2018-07-31 RX ORDER — AMOXICILLIN 250 MG
2 CAPSULE ORAL 2 TIMES DAILY
Status: DISCONTINUED | OUTPATIENT
Start: 2018-07-31 | End: 2018-08-04 | Stop reason: HOSPADM

## 2018-07-31 RX ADMIN — SODIUM CHLORIDE 1000 ML: 9 INJECTION, SOLUTION INTRAVENOUS at 01:04

## 2018-07-31 RX ADMIN — ATORVASTATIN CALCIUM 10 MG: 10 TABLET, FILM COATED ORAL at 18:09

## 2018-07-31 RX ADMIN — IOHEXOL 100 ML: 350 INJECTION, SOLUTION INTRAVENOUS at 00:54

## 2018-07-31 RX ADMIN — SODIUM CHLORIDE: 9 INJECTION, SOLUTION INTRAVENOUS at 22:10

## 2018-07-31 RX ADMIN — SODIUM CHLORIDE: 9 INJECTION, SOLUTION INTRAVENOUS at 08:41

## 2018-07-31 RX ADMIN — METRONIDAZOLE 500 MG: 500 INJECTION, SOLUTION INTRAVENOUS at 08:40

## 2018-07-31 RX ADMIN — TERAZOSIN HYDROCHLORIDE ANHYDROUS 5 MG: 5 CAPSULE ORAL at 08:39

## 2018-07-31 RX ADMIN — CEFTRIAXONE SODIUM 1 G: 1 INJECTION, POWDER, FOR SOLUTION INTRAMUSCULAR; INTRAVENOUS at 08:39

## 2018-07-31 RX ADMIN — METRONIDAZOLE 500 MG: 500 TABLET ORAL at 14:17

## 2018-07-31 RX ADMIN — STANDARDIZED SENNA CONCENTRATE AND DOCUSATE SODIUM 2 TABLET: 8.6; 5 TABLET, FILM COATED ORAL at 08:39

## 2018-07-31 RX ADMIN — CLOPIDOGREL 75 MG: 75 TABLET, FILM COATED ORAL at 08:39

## 2018-07-31 RX ADMIN — PIPERACILLIN AND TAZOBACTAM 3.38 G: 3; .375 INJECTION, POWDER, LYOPHILIZED, FOR SOLUTION INTRAVENOUS; PARENTERAL at 01:04

## 2018-07-31 RX ADMIN — ASPIRIN 81 MG: 81 TABLET, COATED ORAL at 08:39

## 2018-07-31 RX ADMIN — METOPROLOL SUCCINATE 25 MG: 25 TABLET, EXTENDED RELEASE ORAL at 08:38

## 2018-07-31 RX ADMIN — LOSARTAN POTASSIUM 50 MG: 50 TABLET ORAL at 08:39

## 2018-07-31 RX ADMIN — METRONIDAZOLE 500 MG: 500 TABLET ORAL at 20:57

## 2018-07-31 ASSESSMENT — PATIENT HEALTH QUESTIONNAIRE - PHQ9
1. LITTLE INTEREST OR PLEASURE IN DOING THINGS: NOT AT ALL
SUM OF ALL RESPONSES TO PHQ9 QUESTIONS 1 AND 2: 0
2. FEELING DOWN, DEPRESSED, IRRITABLE, OR HOPELESS: NOT AT ALL
1. LITTLE INTEREST OR PLEASURE IN DOING THINGS: NOT AT ALL
SUM OF ALL RESPONSES TO PHQ9 QUESTIONS 1 AND 2: 0
2. FEELING DOWN, DEPRESSED, IRRITABLE, OR HOPELESS: NOT AT ALL

## 2018-07-31 ASSESSMENT — ENCOUNTER SYMPTOMS
EYES NEGATIVE: 1
NEUROLOGICAL NEGATIVE: 1
CONSTITUTIONAL NEGATIVE: 1
PSYCHIATRIC NEGATIVE: 1
GASTROINTESTINAL NEGATIVE: 1
RESPIRATORY NEGATIVE: 1
CARDIOVASCULAR NEGATIVE: 1
MUSCULOSKELETAL NEGATIVE: 1

## 2018-07-31 ASSESSMENT — LIFESTYLE VARIABLES
EVER_SMOKED: YES
ALCOHOL_USE: NO

## 2018-07-31 ASSESSMENT — COGNITIVE AND FUNCTIONAL STATUS - GENERAL
MOBILITY SCORE: 18
PERSONAL GROOMING: A LITTLE
WALKING IN HOSPITAL ROOM: A LITTLE
DAILY ACTIVITIY SCORE: 18
MOVING FROM LYING ON BACK TO SITTING ON SIDE OF FLAT BED: A LITTLE
STANDING UP FROM CHAIR USING ARMS: A LITTLE
HELP NEEDED FOR BATHING: A LITTLE
SUGGESTED CMS G CODE MODIFIER MOBILITY: CK
TURNING FROM BACK TO SIDE WHILE IN FLAT BAD: A LITTLE
CLIMB 3 TO 5 STEPS WITH RAILING: A LITTLE
DRESSING REGULAR UPPER BODY CLOTHING: A LITTLE
MOVING TO AND FROM BED TO CHAIR: A LITTLE
EATING MEALS: A LITTLE
TOILETING: A LITTLE
SUGGESTED CMS G CODE MODIFIER DAILY ACTIVITY: CK
DRESSING REGULAR LOWER BODY CLOTHING: A LITTLE

## 2018-07-31 ASSESSMENT — PAIN SCALES - GENERAL
PAINLEVEL_OUTOF10: 2
PAINLEVEL_OUTOF10: 2
PAINLEVEL_OUTOF10: 0

## 2018-07-31 NOTE — ED NOTES
Pt. Assisted with urinal and updated on the POC to be admitted to the hospital. Call light within reach. Will continue to monitor.

## 2018-07-31 NOTE — ED NOTES
Report given to Jaida JETER who will be assuming care of this pt. On T428-01. Pt transferred to T428-01, Jaida JETER aware of pt's pending arrival, all belongings accounted for.

## 2018-07-31 NOTE — ED NOTES
Lact Acid drawn and sent. Urine sample obtained and sent to lab. Pt. Updated on POC for labs to be resulted and CT scan. Call light within reach. Will continue to monitor.

## 2018-07-31 NOTE — ED NOTES
Pt. Assisted with urinal. Pt. Apologized to for long wait to be admitted. Pt. Repositioned for comfort in [facility]. Pt. States no additional needs at this time. Call light within reach. Will continue to monitor.

## 2018-07-31 NOTE — ED NOTES
Pt. States back pain between his shoulder blades that is not radiating. Pt's family also states that he has become increasingly weak over the past day and is now unable to walk. Pt. Has a hx of AAA. Pt. Changed into gown and hooked up to monitoring equipment. Pt. Up for ERP to see.

## 2018-07-31 NOTE — PROGRESS NOTES
Accepted patient. Consulted Dr Mckeon of GI and patient likely needs an ERCP to open occluded CBD stent. Patient currently has no pain and appears he has active cancer.

## 2018-07-31 NOTE — ASSESSMENT & PLAN NOTE
Unclear etiology, question if there is an infection in the common bile duct or biliary tree.  We have started antibiotics for the same and will continue current course at this time without alterations, consider converting PO soon and likely will finish a course at home

## 2018-07-31 NOTE — DISCHARGE PLANNING
Transitional Care Navigator:    Per chart review patient was on service with [facility] and would benefit from a resumption of services at time of DC. Please place order for HH prior to a DC of home.

## 2018-07-31 NOTE — CONSULTS
DATE OF SERVICE:  07/31/2018    REFERRING PHYSICIAN:  Dr. Luis Alfredo Klein.    CHIEF COMPLAINT:  Back pain.    HISTORY OF PRESENT ILLNESS:  This patient is an 89-year-old white male who has   a history ampullary cancer.  He has a history of a cholecystectomy done in   2016 and then received radiation therapy and chemotherapy postoperatively.    The family's impression was that he was cured.  The patient, however, has had   an ERCP done noting a distal biliary stricture that was about 3 cm long.    Metal stent was placed and he did well until 04/2018 at which time he was   admitted with cholangitis and found to have E. coli bacteremia.  At that point   in time, he had an ERCP done that showed tumor ingrowth into his existing   stent as well as a buildup of small stone fragments of biliary sludge proximal   to this.  The stent was then replaced and his common bile duct was cleared.    The patient was in his usual state of health until the night prior to   admission when he developed problems with vague back pain that he would not   qualify any further.  He also had fever and chills and was brought into the   emergency room, noted to have abnormal liver function tests and was admitted   to the hospital.  He denies any change in bowel habits or overt GI bleeding.    His weight has been gradually decreasing.  The patient's wife has noted a   gradual progressive decrease in the patient's functional ability.    ALLERGIES:  No known allergies.    MEDICATIONS:  As an outpatient, he was on:  1.  Aspirin.  2.  Lipitor.  3.  Plavix.  4.  Cozaar.  5.  Megace.  6.  Metoprolol.  7.  Potassium.  8.  Hytrin.    PAST MEDICAL HISTORY:  1.  Biliary ampullary cancer.  2.  Multiple ERCPs for biliary obstruction with his most recent one being in   04/2018, at which time a metal stent was placed.  3.  History of cholangitis with E. coli bacteremia.  4.  Hyperlipidemia.  5.  Hypertension.  6.  Coronary artery disease with stents for which he is  on Plavix and aspirin.  7.  Cholecystectomy.  8.  Chronic anticoagulation.    SOCIAL HISTORY:  The patient is , does not smoke, and occasionally   drinks alcohol.    FAMILY HISTORY:  Noncontributory.    REVIEW OF SYSTEMS:  GENERAL:  Fatigue and malaise.  RHEUMATOLOGIC:  Back pain.    Otherwise, 13-point review of systems is unremarkable.    PHYSICAL EXAMINATION:  VITAL SIGNS:  Blood pressure is 135/82, pulse is 80, respiratory rate is 18,   the temperature is afebrile.  SKIN:  No stigmata of chronic liver disease.  HEENT:  Head normocephalic.  Eyes are nonicteric.  NECK:  Supple.  LYMPH NODES:  Negative supraclavicular, cervical and axillary.  HEART:  Regular rate and rhythm.  LUNGS:  Clear to auscultation and percussion.  ABDOMEN:  Normoactive bowel sounds.  Mild tenderness in the right upper   quadrant without rebound tenderness, organomegaly, or guarding.  EXTREMITIES:  Degenerative changes.  NEUROLOGIC:  Normal.    LABORATORY DATA:  CT scan shows a dilated common bile duct with debris with a   stent in the distal common bile duct.  There is worsening pancreatic ductal   dilatation as well as a right inguinal hernia and an enlarged prostate.  CT   angiogram of the chest shows pneumobilia, but no evidence of pulmonary   embolism.  Hematocrit 32.1, white blood cell count 12.5, platelet count   171,000.  Sodium 136, potassium 3.9, creatinine 1.01.  Lipase is 52, AST is   228, ALT is 222, alkaline phosphatase is 464, bilirubin is 2.3.    IMPRESSION AND RECOMMENDATIONS:  1.  Abnormal liver function tests, most likely obstructive due to recurrence   of his ingrowth of tumor into his metal stent, which was the case during his   endoscopic retrograde cholangiopancreatography in 04/2018 with most likely   debris buildup proximal to this area.  He may well have cholangitis as well.    He will be treated with IV antibiotics and prior to discharge, a potential   endoscopic retrograde cholangiopancreatography to clean  out any debris and   potentially place a stent would be prudent.  I had a long conversation with   the patient's wife and shared with them that he has active cancer and that it   is unresectable.  2.  Cholangitis.  The patient has been placed on antibiotics for this.  3.  Back pain, most likely related to his cholangitis.  4.  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  I   shared with the patient and the wife that he does indeed have active cancer.  5.  Leukocytosis, compatible with his probable cholangitis.  6.  Pneumobilia.  This is not surprising in the setting of him having a   sphincterotomy and stent placement and I do not think this is indicative of   anything onerous.  7.  Hypertension, on medications.  8.  Coronary artery disease.  Prior to an endoscopic retrograde   cholangiopancreatography, we will need to hold his Plavix for a few days.    Currently, the patient has just been admitted and we will leave home on   antibiotic therapy for a few days and eventually hold his anticoagulants and   most likely proceed with an endoscopic retrograde cholangiopancreatography.  9.  Anemia.  This is a chronic problem for him without change.  10.  Right inguinal hernia.       ____________________________________     Federico Mckeon MD    JTD / NTS    DD:  07/31/2018 12:30:44  DT:  07/31/2018 13:58:35    D#:  [id number]  Job#:  [id number]

## 2018-07-31 NOTE — ED NOTES
Pt. Sleeping in [facility] at this time. VSS. Call light within reach. Will continue to monitor.

## 2018-07-31 NOTE — ED PROVIDER NOTES
Scribed for James Mendez M.D. by Jakob Cota. 7/30/2018  9:08 PM    CHIEF COMPLAINT  Chief Complaint   Patient presents with   • Weakness   • Back Pain     between shoulder blades       HPI  Donato Burciaga is a 89 y.o. Male with a history of AAA who presents to the ED with back pain localized between his shoulder blades onset earlier today. Per the patients family, Donato has been getting increasingly weak over the last 24 hours and can no longer walk. The patient denies feeling any pain at this time. He denies taking any blood thinning medications. Patient reports no exacerbating or alleviating symptoms.    Patient denies any fever, cough, chest pain, nausea, vomiting, diarrhea, blood in stool, or dysuria     REVIEW OF SYSTEMS  See HPI for further details.   Pertinent positives include: Back pain, weakness  Pertinent negatives include: Fever, cough, chest pain, nausea, vomiting, diarrhea, hematochezia, dysuria  10+ review of systems otherwise negative  C.  ROS    PAST MEDICAL HISTORY   has a past medical history of Alcohol use (2016); Arthritis; Cancer (HCC); Cataract; Dental disorder; High cholesterol; History of chemotherapy (last dose 9/2016); History of radiation therapy (last done on 09/28/2016); Hyperlipidemia, mixed (1990); Hypertension; MI (myocardial infarction) (HCC); and Snoring.    SOCIAL HISTORY  Social History     Social History Main Topics   • Smoking status: Former Smoker     Years: 0.00     Types: Cigarettes     Quit date: 1/1/1960   • Smokeless tobacco: Never Used   • Alcohol use No   • Drug use: No   • Sexual activity: Not Currently     Partners: Female       SURGICAL HISTORY   has a past surgical history that includes ercp in or (N/A, 2/8/2016); florinda by laparoscopy (2/10/2016); gastroscopy-endo (N/A, 7/11/2016); egd w/endoscopic ultrasound (N/A, 7/11/2016); ercp w/rem fb and/or stent chg (N/A, 7/11/2016); ercp w/rem fb and/or stent chg (N/A, 12/5/2016); cardiac cath (02/21/2018);  and ercp in or (4/19/2018).    CURRENT MEDICATIONS  No current facility-administered medications on file prior to encounter.      Current Outpatient Prescriptions on File Prior to Encounter   Medication Sig Dispense Refill   • metoprolol SR (TOPROL XL) 25 MG TABLET SR 24 HR Take 1 Tab by mouth every day. 90 Tab 3   • losartan (COZAAR) 50 MG Tab Take 1 Tab by mouth every day. 90 Tab 3   • atorvastatin (LIPITOR) 10 MG Tab Take 1 Tab by mouth every evening. 90 Tab 3   • clopidogrel (PLAVIX) 75 MG Tab Take 1 Tab by mouth every day. 90 Tab 3   • potassium chloride SA (KDUR) 20 MEQ Tab CR Take 1 Tab by mouth every day. 90 Tab 3   • megestrol (MEGACE) 20 MG Tab Take 1 Tab by mouth every day. 90 Tab 3   • terazosin (HYTRIN) 5 MG Cap Take 1 Cap by mouth every day. 90 Cap 3   • aspirin EC 81 MG EC tablet Take 1 Tab by mouth every day. (Patient taking differently: Take 81 mg by mouth every evening.) 30 Tab 3   • MAGNESIUM PO Take 250 mg by mouth 1 time daily as needed (supplement).     • Vitamin D, Cholecalciferol, 1000 UNITS Cap Take 2,000 Units by mouth every day at 6 PM.         ALLERGIES  No Known Allergies    PHYSICAL EXAM  Physical Exam   VITAL SIGNS: /60   Pulse (!) 112   Temp 36.8 °C (98.2 °F)   Resp 16   Wt 63 kg (139 lb)   SpO2 95%   BMI 21.13 kg/m²    SpO2: I interpret this pulse oximetry as normal  Constitutional: Well developed, Well nourished, no distress, Non-toxic appearance.   HENT: Normocephalic, Atraumatic, Bilateral external ears normal, Oropharynx moist, No oral exudates.   Eyes: PERRLA, EOMI, Conjunctiva normal, No discharge.   Neck: No tenderness, Supple, No stridor.   Lymphatic: No lymphadenopathy noted.   Cardiovascular: Systolic murmur, Normal heart rate, Normal rhythm.  Equal radial pulses.  Normal DP pulses.  Thorax & Lungs: Clear to auscultation bilaterally, No respiratory distress, No wheezing, No crackles.   Abdomen: Soft, No tenderness, No masses, No pulsatile masses.   Skin: Warm,  Dry, No erythema, No rash.   Extremities:, No lower extremity edema, No cyanosis.   Musculoskeletal: No tenderness to palpation or major deformities noted.  Intact distal pulses  Genitourinary: Large scrotal hernia, no skin changes, no significant edema or tenderness  Neurologic: Cranial nerves grossly intact, strength and sensation intact, Awake, alert. Moves all extremities spontaneously.  Psychiatric: Affect normal, Judgment normal, Mood normal.     DIAGNOSTIC STUDIES / PROCEDURES    LABORATORY  Results for orders placed or performed during the hospital encounter of 07/30/18   CBC WITH DIFFERENTIAL   Result Value Ref Range    WBC 12.5 (H) 4.8 - 10.8 K/uL    RBC 3.55 (L) 4.70 - 6.10 M/uL    Hemoglobin 10.9 (L) 14.0 - 18.0 g/dL    Hematocrit 32.4 (L) 42.0 - 52.0 %    MCV 91.3 81.4 - 97.8 fL    MCH 30.7 27.0 - 33.0 pg    MCHC 33.6 (L) 33.7 - 35.3 g/dL    RDW 47.6 35.9 - 50.0 fL    Platelet Count 171 164 - 446 K/uL    MPV 10.0 9.0 - 12.9 fL    Neutrophils-Polys 89.70 (H) 44.00 - 72.00 %    Lymphocytes 1.30 (L) 22.00 - 41.00 %    Monocytes 8.10 0.00 - 13.40 %    Eosinophils 0.10 0.00 - 6.90 %    Basophils 0.10 0.00 - 1.80 %    Immature Granulocytes 0.70 0.00 - 0.90 %    Nucleated RBC 0.00 /100 WBC    Neutrophils (Absolute) 11.18 (H) 1.82 - 7.42 K/uL    Lymphs (Absolute) 0.16 (L) 1.00 - 4.80 K/uL    Monos (Absolute) 1.01 (H) 0.00 - 0.85 K/uL    Eos (Absolute) 0.01 0.00 - 0.51 K/uL    Baso (Absolute) 0.01 0.00 - 0.12 K/uL    Immature Granulocytes (abs) 0.09 0.00 - 0.11 K/uL    NRBC (Absolute) 0.00 K/uL   COMP METABOLIC PANEL   Result Value Ref Range    Sodium 136 135 - 145 mmol/L    Potassium 3.9 3.6 - 5.5 mmol/L    Chloride 104 96 - 112 mmol/L    Co2 19 (L) 20 - 33 mmol/L    Anion Gap 13.0 (H) 0.0 - 11.9    Glucose 156 (H) 65 - 99 mg/dL    Bun 34 (H) 8 - 22 mg/dL    Creatinine 1.01 0.50 - 1.40 mg/dL    Calcium 8.8 8.5 - 10.5 mg/dL    AST(SGOT) 228 (H) 12 - 45 U/L    ALT(SGPT) 222 (H) 2 - 50 U/L    Alkaline Phosphatase  462 (H) 30 - 99 U/L    Total Bilirubin 2.5 (H) 0.1 - 1.5 mg/dL    Albumin 3.4 3.2 - 4.9 g/dL    Total Protein 5.7 (L) 6.0 - 8.2 g/dL    Globulin 2.3 1.9 - 3.5 g/dL    A-G Ratio 1.5 g/dL   LIPASE   Result Value Ref Range    Lipase 52 11 - 82 U/L   TROPONIN   Result Value Ref Range    Troponin I 0.02 0.00 - 0.04 ng/mL   BTYPE NATRIURETIC PEPTIDE   Result Value Ref Range    B Natriuretic Peptide 62 0 - 100 pg/mL   LACTIC ACID   Result Value Ref Range    Lactic Acid 2.0 0.5 - 2.0 mmol/L   URINALYSIS CULTURE, IF INDICATED   Result Value Ref Range    Color DK Yellow     Character Clear     Specific Gravity 1.026 <1.035    Ph 5.0 5.0 - 8.0    Glucose Negative Negative mg/dL    Ketones Trace (A) Negative mg/dL    Protein Negative Negative mg/dL    Bilirubin Moderate (A) Negative    Urobilinogen, Urine 1.0 Negative    Nitrite Negative Negative    Leukocyte Esterase Small (A) Negative    Occult Blood Negative Negative    Micro Urine Req Microscopic    APTT   Result Value Ref Range    APTT 24.5 (L) 24.7 - 36.0 sec   PROTHROMBIN TIME (INR)   Result Value Ref Range    PT 14.0 12.0 - 14.6 sec    INR 1.11 0.87 - 1.13   ESTIMATED GFR   Result Value Ref Range    GFR If African American >60 >60 mL/min/1.73 m 2    GFR If Non African American >60 >60 mL/min/1.73 m 2   URINE MICROSCOPIC (W/UA)   Result Value Ref Range    WBC 2-5 (A) /hpf    RBC 2-5 (A) /hpf    Bacteria Negative None /hpf    Epithelial Cells Negative /hpf    Hyaline Cast 3-5 (A) /lpf   EKG (NOW)   Result Value Ref Range    Report       [facility] Emergency Dept.    Test Date:  2018  Pt Name:    DILMA WRIGHT               Department: ER  MRN:        [id number]                      Room:       RD 06  Gender:     Male                         Technician: [id number]  :        [id number]                   Requested By:ER TRIAGE PROTOCOL  Order #:    [id number]                    Reading MD:    Measurements  Intervals                                 Axis  Rate:       94                           P:          49  WI:         156                          QRS:        37  QRSD:       86                           T:          44  QT:         348  QTc:        436    Interpretive Statements  SINUS RHYTHM  Compared to ECG 04/17/2018 00:06:39  Sinus tachycardia no longer present         RADIOLOGY    CT-CTA CHEST PULMONARY ARTERY W/ RECONS   Final Result         1. No CT evidence of pulmonary embolism. Evaluation of the subsegmental branches of the bilateral lower lobes are limited due to motion artifact.      2. Patchy bibasilar opacities, left more than right, atelectasis versus consolidation.      3. Pneumobilia and dilated biliary ducts. Correlate clinically.      DX-CHEST-PORTABLE (1 VIEW)   Final Result         1. No acute cardiopulmonary abnormalities are identified.      CT-ABDOMEN-PELVIS WITH    (Results Pending)     Chest XR (my read): No focal consolidation concerning for pneumonia      CHART REVIEW  Pertinent medical chart information was reviewed and reveals: No recent pertinent visits ERCP and cholecystectomy in 2016    COURSE & MEDICAL DECISION MAKING  Pertinent Labs & Imaging studies reviewed. (See chart for details)    Vitals:    07/30/18 2027 07/30/18 2037   BP: 108/60    Pulse: (!) 112    Resp: 16    Temp: 36.8 °C (98.2 °F)    SpO2: 95%    Weight:  63 kg (139 lb)     9:08 PM Patient evaluated and examined at bedside. Ordered Estimated GFR, DX-Chest, Prothrombin Time, APTT, Urinalysis culture if indicated, Lactic Acid, Bype Natriuretic peptide, Troponin, Lipase, CMP, CBC with differential, EKG, Urine Microscopic and CT-CTA Chest Pulmonary Artery to evaluate patient's symptoms. Patient will be treated with IV fluids secondary to hypotension.    12:22 AM Patient was reevaluated at bedside who reports he feels the same. Discussed lab and radiology results with the patient and informed him I found a pneumobilia in his radiology results. Ordered for Lactic  Acid, CT-Abdomen, and Prothrombin Time. Patient will be treated with Zosyn 3.375 g     12:34 AM Spoke with Dr. Jordan, Surgery, with history of cholecystectomy, no emergent surgical intervention indicated, advise further imaging.    12:41 AM Spoke with Dr. Klein, Hospitalist, who agrees to see the patient.    1:01 AM Patient was reevaluated at bedside and discussed CT findings. Informed patient he will be admitted to the hospital    89-year-old male presenting with acute upper back pain.  Initial history with family and nursing concerning for dissection.  However stable initial vital signs, no hypertension, bedside ultrasound without pericardial effusion, no signs of AAA.  Patient very comfortable on initial evaluation.  Denies any current pain.  Patient borderline hypotensive and tachycardic during course, given fluid boluses, improved.  EKG normal, troponin negative, patient denying chest pain, unlikely ACS.  CT ordered to rule out pulmonary embolism with possible chest/back pain, bedside ultrasound with signs of slight right ventricular enlargement.  CT negative, however did reveal pneumobilia.  LFTs elevated, had normal LFTs 2 months ago.  Reevaluation of patient, no complaints of pain, afebrile.  Discussed with surgery, Dr. Jordan, unlikely to have pneumobilia from procedure in 2016 (ERCP), no indications for surgical intervention at this time, appreciate imaging recommendations.  CT abdomen and pelvis ordered, pending at this time.  Given empiric antibiotics for potential ascending cholangitis as cause of pneumobilia.  Consulted medicine for admission.  Patient hemodynamically stable and comfortable at time of admission.    DISPOSITION  Patient will be admitted to Dr. Klein in guarded condition.    FINAL IMPRESSION  1.  Pneumobilia  2.  Transaminitis  3.  Back pain     I, Jakob Cota (Scribmarc), am scribing for, and in the presence of, James Mendez M.D..    Electronically signed by: Jakob Cota  (Scribe), 7/30/2018    James MCBRIDE M.D. personally performed the services described in this documentation, as scribed by Jakob Cota in my presence, and it is both accurate and complete.    The note accurately reflects work and decisions made by me.  James Mendez  7/31/2018  2:43 AM

## 2018-07-31 NOTE — H&P
Hospital Medicine History & Physical Note    Date of Service  7/31/2018    Primary Care Physician  Fabiano Burk M.D.    Consultants  [facility]     Code Status  Full code     Chief Complaint  Back pain    History of Presenting Illness  89 y.o. male with history of dyslipidemia on statin therapy, essential hypertension controlled medication regimen, and coronary artery disease on medication regimen including Plavix, was in his usual state of health until the day prior to admission.  He apparently developed a large amount of back pain located between his shoulder blades which was sharp in nature.  It did not have any exacerbating or alleviating factors but was quite alarming, causing his wife to notify emergency medical services after which she was brought to this facility.  He currently however states that he has no pain, no chest pain shortness of breath, back pain abdominal pain nausea or vomiting.  He has no complaints.    Review of Systems  Review of Systems   Constitutional: Negative.    HENT: Negative.    Eyes: Negative.    Respiratory: Negative.    Cardiovascular: Negative.    Gastrointestinal: Negative.    Genitourinary: Negative.    Musculoskeletal: Negative.    Skin: Negative.    Neurological: Negative.    Endo/Heme/Allergies: Negative.    Psychiatric/Behavioral: Negative.        Past Medical History   has a past medical history of Alcohol use (2016); Arthritis; Cancer (HCC); Cataract; Dental disorder; High cholesterol; History of chemotherapy (last dose 9/2016); History of radiation therapy (last done on 09/28/2016); Hyperlipidemia, mixed (1990); Hypertension; MI (myocardial infarction) (HCC); and Snoring.    Surgical History   has a past surgical history that includes ercp in or (N/A, 2/8/2016); florinda by laparoscopy (2/10/2016); gastroscopy-endo (N/A, 7/11/2016); egd w/endoscopic ultrasound (N/A, 7/11/2016); ercp w/rem fb and/or stent chg (N/A, 7/11/2016); ercp w/rem fb and/or stent chg  (N/A, 12/5/2016); cardiac cath (02/21/2018); and ercp in or (4/19/2018).     Family History  family history includes No Known Problems in his father and mother.     Social History   reports that he quit smoking about 58 years ago. His smoking use included Cigarettes. He quit after 0.00 years of use. He has never used smokeless tobacco. He reports that he does not drink alcohol or use drugs.    Allergies  No Known Allergies    Medications  Prior to Admission Medications   Prescriptions Last Dose Informant Patient Reported? Taking?   MAGNESIUM PO 7/11/2018  Yes No   Sig: Take 250 mg by mouth 1 time daily as needed (supplement).   Vitamin D, Cholecalciferol, 1000 UNITS Cap 7/11/2018  Yes No   Sig: Take 2,000 Units by mouth every day at 6 PM.   aspirin EC 81 MG EC tablet 7/11/2018  No No   Sig: Take 1 Tab by mouth every day.   Patient taking differently: Take 81 mg by mouth every evening.   atorvastatin (LIPITOR) 10 MG Tab 7/11/2018  No No   Sig: Take 1 Tab by mouth every evening.   clopidogrel (PLAVIX) 75 MG Tab 7/11/2018  No No   Sig: Take 1 Tab by mouth every day.   losartan (COZAAR) 50 MG Tab 7/11/2018  No No   Sig: Take 1 Tab by mouth every day.   megestrol (MEGACE) 20 MG Tab 7/11/2018  No No   Sig: Take 1 Tab by mouth every day.   metoprolol SR (TOPROL XL) 25 MG TABLET SR 24 HR 7/11/2018  No No   Sig: Take 1 Tab by mouth every day.   potassium chloride SA (KDUR) 20 MEQ Tab CR 7/11/2018  No No   Sig: Take 1 Tab by mouth every day.   terazosin (HYTRIN) 5 MG Cap 7/11/2018  No No   Sig: Take 1 Cap by mouth every day.      Facility-Administered Medications: None       Physical Exam  Blood Pressure : 108/60   Temperature: 36.8 °C (98.2 °F)   Pulse: 69   Respiration: 15   Pulse Oximetry: 92 %     Physical Exam   Constitutional: He is oriented to person, place, and time. He appears well-developed. No distress.   HENT:   Head: Normocephalic and atraumatic.   Eyes: Pupils are equal, round, and reactive to light.  Conjunctivae are normal.   Neck: Normal range of motion. Neck supple. No tracheal deviation present. No thyromegaly present.   Cardiovascular: Normal rate, regular rhythm and normal heart sounds.  Exam reveals no gallop and no friction rub.    No murmur heard.  Pulmonary/Chest: Effort normal and breath sounds normal. No respiratory distress. He has no wheezes.   Abdominal: Soft. Bowel sounds are normal. He exhibits no distension and no mass. There is no tenderness. There is no rebound and no guarding.   Musculoskeletal: Normal range of motion. He exhibits no edema.   Lymphadenopathy:     He has no cervical adenopathy.   Neurological: He is alert and oriented to person, place, and time. No cranial nerve deficit.   Skin: Skin is warm and dry. He is not diaphoretic.   Psychiatric: He has a normal mood and affect.   Nursing note and vitals reviewed.      Laboratory:  Recent Labs      07/30/18 2058   WBC  12.5*   RBC  3.55*   HEMOGLOBIN  10.9*   HEMATOCRIT  32.4*   MCV  91.3   MCH  30.7   MCHC  33.6*   RDW  47.6   PLATELETCT  171   MPV  10.0     Recent Labs      07/30/18 2058   SODIUM  136   POTASSIUM  3.9   CHLORIDE  104   CO2  19*   GLUCOSE  156*   BUN  34*   CREATININE  1.01   CALCIUM  8.8     Recent Labs      07/30/18 2058   ALTSGPT  222*   ASTSGOT  228*   ALKPHOSPHAT  462*   TBILIRUBIN  2.5*   LIPASE  52   GLUCOSE  156*     Recent Labs      07/30/18 2058 07/31/18   0105   APTT  24.5*   --    INR  1.11  1.21*     Recent Labs      07/30/18 2058   BNPBTYPENAT  62         Lab Results   Component Value Date    TROPONINI 0.02 07/30/2018       Urinalysis:    Recent Labs      07/30/18 2126   SPECGRAVITY  1.026   GLUCOSEUR  Negative   KETONES  Trace*   NITRITE  Negative   LEUKESTERAS  Small*   WBCURINE  2-5*   RBCURINE  2-5*   BACTERIA  Negative   EPITHELCELL  Negative        Imaging:  CT-ABDOMEN-PELVIS WITH   Final Result         1. There is a CBD stent. There is debris/sludge in the dilated CBD and proximal  intrahepatic bile ducts. Obstruction of the stent and/or superimposed infection are possible.      2. Worsening pancreatic duct dilatation. 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 The differential includes pancreatic duct obstruction due to the stent.      3. Large right inguinal hernia containing the stool-filled cecum.      4. Significantly enlarged prostate.      5. Sigmoid diverticulosis.      CT-CTA CHEST PULMONARY ARTERY W/ RECONS   Final Result         1. No CT evidence of pulmonary embolism. Evaluation of the subsegmental branches of the bilateral lower lobes are limited due to motion artifact.      2. Patchy bibasilar opacities, left more than right, atelectasis versus consolidation.      3. Pneumobilia and dilated biliary ducts. Correlate clinically.      DX-CHEST-PORTABLE (1 VIEW)   Final Result         1. No acute cardiopulmonary abnormalities are identified.            Assessment/Plan:  I anticipate this patient will require at least two midnights for appropriate medical management, necessitating inpatient admission.    * Cholangitis- (present on admission)   Assessment & Plan    Concern for the same given pneumobilia despite lack of the gallbladder.  Will continue with antibiotic therapy as discussed.  Monitor.        Coronary artery disease involving native heart without angina pectoris- (present on admission)   Assessment & Plan    Stable with current medication regimen.        Essential hypertension- (present on admission)   Assessment & Plan    Controlled with current medication regimen.  Monitor.        Normocytic anemia   Assessment & Plan    Likely due to chronic disease.  No evidence of current bleed. Transfuse if needed for hemoglobin less than 7 gm/dL          Pneumobilia   Assessment & Plan    Unclear etiology, question if there is an infection in the common bile duct or biliary tree.  We have started antibiotics for the same.             VTE prophylaxis: SCD, lovenox

## 2018-07-31 NOTE — ED NOTES
Pt. Sleeping in [facility] at this time. Call light within reach. Will continue to monitor.

## 2018-07-31 NOTE — ED TRIAGE NOTES
Donato Burciaga  89 y.o.  male  Chief Complaint   Patient presents with   • Weakness   • Back Pain     between shoulder blades     Brought in by [facility] from home due to increasing weakness. Per wife patient was walking fine yesterday. Today very weak and unable to ambulate without assistance. Also c/o pain in between shoulder blades x 2 days. Hx of AAA.

## 2018-07-31 NOTE — PROGRESS NOTES
Immunization Note    The patient has received Prevnar vaccine on 10/2017 (age 88) and 11/2016 (age 87) as well as two doses of Pneumovax on 11/2009 (age 80) and 11/2003 (age 74). Patient has received all required Pneumococcal immunizations for adults 65 or older.    Ro Hernandez, PharmD

## 2018-07-31 NOTE — ED NOTES
Pt's neighbor Efrem asking to be called when pt. Gets to admission room tonight and voicemail left. Pt. Verbalizes this is okay. Efrem's phone number is: [phone].

## 2018-07-31 NOTE — ASSESSMENT & PLAN NOTE
Likely due to chronic disease.  No evidence of current bleed. Transfuse if needed for hemoglobin less than 7 gm/dL

## 2018-07-31 NOTE — PROGRESS NOTES
"Report received from ED.   Pt arrived to the unit into room 428-1. Pt is alert and oriented. This RN introduced.   Pt walked to the bed with a SBA and tolerated well. Pt has generalized weakness. A lower lip quiver was noticed and pt states that he has had it \"for a long time\"  Pain is currently rated 2/10 in back. Repositioned for comfort.   Pt is on room air. Lungs sound clear.   Bowels sound normoactive x4. +gas. LBM:7/31 upon arrival  Abdomen assessment: flat, soft    Neuro: pt has intermittent quicker to his bottom lip. Pt has previous injury to his left arm that he states was injured while bowling and the \"muscle just went up inside there\". Pt unable to hold things steady with his left arm.     Skin: small abrasion/scab to the top left side of his head. Small scab/abrasion to the left nostril. Small scab/abrasion to the mid back- band-aid applied. Small red sot on mid back- blanching. flaky legs. Red bilateral ears where the glasses sit- blanching.     Admit profile completed. Pt has glasses on and dentures in.   Pt oriented to the call light, the bathroom location, change of shift times. POC discussed. Questions answered.     Wife Kris called to update that pt was on this unit.           "

## 2018-08-01 ENCOUNTER — HOME HEALTH ADMISSION (OUTPATIENT)
Dept: HOME HEALTH SERVICES | Facility: HOME HEALTHCARE | Age: 83
End: 2018-08-01
Payer: MEDICARE

## 2018-08-01 LAB
ALBUMIN SERPL BCP-MCNC: 3 G/DL (ref 3.2–4.9)
ALP SERPL-CCNC: 368 U/L (ref 30–99)
ALT SERPL-CCNC: 131 U/L (ref 2–50)
ANION GAP SERPL CALC-SCNC: 5 MMOL/L (ref 0–11.9)
AST SERPL-CCNC: 65 U/L (ref 12–45)
BASOPHILS # BLD AUTO: 0.2 % (ref 0–1.8)
BASOPHILS # BLD: 0.02 K/UL (ref 0–0.12)
BILIRUB CONJ SERPL-MCNC: 0.4 MG/DL (ref 0.1–0.5)
BILIRUB INDIRECT SERPL-MCNC: 0.5 MG/DL (ref 0–1)
BILIRUB SERPL-MCNC: 0.9 MG/DL (ref 0.1–1.5)
BUN SERPL-MCNC: 18 MG/DL (ref 8–22)
CALCIUM SERPL-MCNC: 8.4 MG/DL (ref 8.5–10.5)
CHLORIDE SERPL-SCNC: 111 MMOL/L (ref 96–112)
CO2 SERPL-SCNC: 22 MMOL/L (ref 20–33)
CREAT SERPL-MCNC: 0.72 MG/DL (ref 0.5–1.4)
EOSINOPHIL # BLD AUTO: 0.18 K/UL (ref 0–0.51)
EOSINOPHIL NFR BLD: 2.2 % (ref 0–6.9)
ERYTHROCYTE [DISTWIDTH] IN BLOOD BY AUTOMATED COUNT: 49 FL (ref 35.9–50)
GLUCOSE SERPL-MCNC: 127 MG/DL (ref 65–99)
HCT VFR BLD AUTO: 31.6 % (ref 42–52)
HGB BLD-MCNC: 10.4 G/DL (ref 14–18)
IMM GRANULOCYTES # BLD AUTO: 0.03 K/UL (ref 0–0.11)
IMM GRANULOCYTES NFR BLD AUTO: 0.4 % (ref 0–0.9)
LYMPHOCYTES # BLD AUTO: 0.52 K/UL (ref 1–4.8)
LYMPHOCYTES NFR BLD: 6.5 % (ref 22–41)
MCH RBC QN AUTO: 30.5 PG (ref 27–33)
MCHC RBC AUTO-ENTMCNC: 32.9 G/DL (ref 33.7–35.3)
MCV RBC AUTO: 92.7 FL (ref 81.4–97.8)
MONOCYTES # BLD AUTO: 0.57 K/UL (ref 0–0.85)
MONOCYTES NFR BLD AUTO: 7.1 % (ref 0–13.4)
NEUTROPHILS # BLD AUTO: 6.73 K/UL (ref 1.82–7.42)
NEUTROPHILS NFR BLD: 83.6 % (ref 44–72)
NRBC # BLD AUTO: 0 K/UL
NRBC BLD-RTO: 0 /100 WBC
PLATELET # BLD AUTO: 141 K/UL (ref 164–446)
PMV BLD AUTO: 9.9 FL (ref 9–12.9)
POTASSIUM SERPL-SCNC: 3.7 MMOL/L (ref 3.6–5.5)
PROT SERPL-MCNC: 5.3 G/DL (ref 6–8.2)
RBC # BLD AUTO: 3.41 M/UL (ref 4.7–6.1)
SODIUM SERPL-SCNC: 138 MMOL/L (ref 135–145)
WBC # BLD AUTO: 8.1 K/UL (ref 4.8–10.8)

## 2018-08-01 PROCEDURE — 99232 SBSQ HOSP IP/OBS MODERATE 35: CPT | Performed by: INTERNAL MEDICINE

## 2018-08-01 PROCEDURE — 700102 HCHG RX REV CODE 250 W/ 637 OVERRIDE(OP): Performed by: HOSPITALIST

## 2018-08-01 PROCEDURE — 36415 COLL VENOUS BLD VENIPUNCTURE: CPT

## 2018-08-01 PROCEDURE — A9270 NON-COVERED ITEM OR SERVICE: HCPCS | Performed by: HOSPITALIST

## 2018-08-01 PROCEDURE — 700102 HCHG RX REV CODE 250 W/ 637 OVERRIDE(OP): Performed by: INTERNAL MEDICINE

## 2018-08-01 PROCEDURE — 85025 COMPLETE CBC W/AUTO DIFF WBC: CPT

## 2018-08-01 PROCEDURE — 770006 HCHG ROOM/CARE - MED/SURG/GYN SEMI*

## 2018-08-01 PROCEDURE — 700105 HCHG RX REV CODE 258: Performed by: HOSPITALIST

## 2018-08-01 PROCEDURE — A9270 NON-COVERED ITEM OR SERVICE: HCPCS | Performed by: INTERNAL MEDICINE

## 2018-08-01 PROCEDURE — 80076 HEPATIC FUNCTION PANEL: CPT

## 2018-08-01 PROCEDURE — 80048 BASIC METABOLIC PNL TOTAL CA: CPT

## 2018-08-01 PROCEDURE — 700111 HCHG RX REV CODE 636 W/ 250 OVERRIDE (IP): Performed by: HOSPITALIST

## 2018-08-01 RX ORDER — HYDRALAZINE HYDROCHLORIDE 20 MG/ML
10 INJECTION INTRAMUSCULAR; INTRAVENOUS EVERY 6 HOURS PRN
Status: DISCONTINUED | OUTPATIENT
Start: 2018-08-01 | End: 2018-08-04 | Stop reason: HOSPADM

## 2018-08-01 RX ADMIN — METOPROLOL SUCCINATE 25 MG: 25 TABLET, EXTENDED RELEASE ORAL at 05:17

## 2018-08-01 RX ADMIN — CEFTRIAXONE SODIUM 1 G: 1 INJECTION, POWDER, FOR SOLUTION INTRAMUSCULAR; INTRAVENOUS at 05:14

## 2018-08-01 RX ADMIN — METRONIDAZOLE 500 MG: 500 TABLET ORAL at 13:40

## 2018-08-01 RX ADMIN — LOSARTAN POTASSIUM 50 MG: 50 TABLET ORAL at 05:16

## 2018-08-01 RX ADMIN — ENOXAPARIN SODIUM 40 MG: 100 INJECTION SUBCUTANEOUS at 05:15

## 2018-08-01 RX ADMIN — METRONIDAZOLE 500 MG: 500 TABLET ORAL at 05:16

## 2018-08-01 RX ADMIN — SODIUM CHLORIDE: 9 INJECTION, SOLUTION INTRAVENOUS at 13:41

## 2018-08-01 RX ADMIN — TERAZOSIN HYDROCHLORIDE ANHYDROUS 5 MG: 5 CAPSULE ORAL at 05:16

## 2018-08-01 RX ADMIN — STANDARDIZED SENNA CONCENTRATE AND DOCUSATE SODIUM 2 TABLET: 8.6; 5 TABLET, FILM COATED ORAL at 05:16

## 2018-08-01 RX ADMIN — CLOPIDOGREL 75 MG: 75 TABLET, FILM COATED ORAL at 05:17

## 2018-08-01 RX ADMIN — ASPIRIN 81 MG: 81 TABLET, COATED ORAL at 05:15

## 2018-08-01 RX ADMIN — ATORVASTATIN CALCIUM 10 MG: 10 TABLET, FILM COATED ORAL at 18:43

## 2018-08-01 RX ADMIN — METRONIDAZOLE 500 MG: 500 TABLET ORAL at 22:16

## 2018-08-01 ASSESSMENT — ENCOUNTER SYMPTOMS
PALPITATIONS: 0
BRUISES/BLEEDS EASILY: 0
LOSS OF CONSCIOUSNESS: 0
SHORTNESS OF BREATH: 0
NAUSEA: 0
FEVER: 0
VOMITING: 0
ABDOMINAL PAIN: 0
DEPRESSION: 0
DIZZINESS: 0
MYALGIAS: 0
NECK PAIN: 0
CHILLS: 0
BLURRED VISION: 0
FOCAL WEAKNESS: 0

## 2018-08-01 ASSESSMENT — PAIN SCALES - GENERAL: PAINLEVEL_OUTOF10: 0

## 2018-08-01 NOTE — DISCHARGE PLANNING
Received Choice form at 4412  Agency/Facility Name: [facility]   Referral sent per Choice form @ [id number]

## 2018-08-01 NOTE — DISCHARGE PLANNING
ATTN: Case Management  RE: Referral for Home Health                We would like to take this opportunity to thank you for submitting a referral for your patient to continue care with [facility]. Our skilled team is dedicated to helping all patients recover and gain independence in the home setting.            As of 8/1/2018, we have accepted the above patient into our service. A [facility] clinician will be out to see the patient within 48 hours to conduct our initial visit. If you have any questions or concerns regarding the patient’s transition to Home Health, please do not hesitate to contact us. We are open for referrals 7 days a week from 8AM to 5PM at [phone].      We look forward to collaborating with you,  [facility] Team

## 2018-08-01 NOTE — DISCHARGE PLANNING
[facility] has received your referral and it is pending review by our nursing supervisors. Once a decision has been made CHRISTOPHER Trujillo will be notified of decision.    Thank you

## 2018-08-01 NOTE — PROGRESS NOTES
Bedside report received.    Assessment complete.  A&O x 4. Reinforced use of call light. Patient calls appropriately.  Patient ambaltes with FFW and standby assist.   Patient denies pain at this time.   Denies N&V. Tolerating regular, cardiac diet.  + void, + flatus  Patient denies SOB.    Review plan with of care with patient. Call light and personal belongings with in reach. Hourly rounding in place. All needs met at this time.

## 2018-08-01 NOTE — PROGRESS NOTES
[facility] Medicine Daily Progress Note    Date of Service  8/1/2018    Chief Complaint  89 y.o. male admitted 7/30/2018 with back pain found to have an occluded CBD stent    Hospital Course  See below    Interval Problem Update  CBD stent occlusion-denies any abdominal pain again today. Labs remain normal. Back pain has resolved as well. Tolerating PO intake without any difficultly.    Consultants/Specialty  GI    Disposition  Surgical    Review of Systems  Review of Systems   Constitutional: Negative for chills and fever.   HENT: Negative for hearing loss.    Eyes: Negative for blurred vision.   Respiratory: Negative for shortness of breath.    Cardiovascular: Negative for chest pain, palpitations and leg swelling.   Gastrointestinal: Negative for abdominal pain, nausea and vomiting.   Genitourinary: Negative for dysuria.   Musculoskeletal: Negative for myalgias and neck pain.   Skin: Negative for itching.   Neurological: Negative for dizziness, focal weakness and loss of consciousness.   Endo/Heme/Allergies: Does not bruise/bleed easily.   Psychiatric/Behavioral: Negative for depression.   All other systems reviewed and are negative.       Physical Exam  Blood Pressure : 146/82   Temperature: 36.7 °C (98.1 °F)   Pulse: 76   Respiration: 20   Pulse Oximetry: 93 %     Physical Exam   Constitutional: He is oriented to person, place, and time. He appears well-developed and well-nourished. No distress.   HENT:   Head: Normocephalic and atraumatic.   Mouth/Throat: No oropharyngeal exudate.   Eyes: Pupils are equal, round, and reactive to light. Right eye exhibits no discharge. Left eye exhibits no discharge.   Neck: Normal range of motion. Neck supple.   Cardiovascular: Normal rate, regular rhythm, normal heart sounds and intact distal pulses.    No murmur heard.  Pulmonary/Chest: Effort normal. No stridor. No respiratory distress. He has no wheezes.   Abdominal: Soft. Bowel sounds are normal. He exhibits no distension.  There is no tenderness.   Musculoskeletal: Normal range of motion. He exhibits no edema or tenderness.   Neurological: He is alert and oriented to person, place, and time. No cranial nerve deficit.   Skin: Skin is warm and dry. No rash noted.   Psychiatric: He has a normal mood and affect.   Nursing note and vitals reviewed.      Fluids    Intake/Output Summary (Last 24 hours) at 08/01/18 1541  Last data filed at 08/01/18 1100   Gross per 24 hour   Intake              400 ml   Output              175 ml   Net              225 ml       Laboratory  Recent Labs      07/30/18 2058 08/01/18   0245   WBC  12.5*  8.1   RBC  3.55*  3.41*   HEMOGLOBIN  10.9*  10.4*   HEMATOCRIT  32.4*  31.6*   MCV  91.3  92.7   MCH  30.7  30.5   MCHC  33.6*  32.9*   RDW  47.6  49.0   PLATELETCT  171  141*   MPV  10.0  9.9     Recent Labs      07/30/18 2058 08/01/18   0245   SODIUM  136  138   POTASSIUM  3.9  3.7   CHLORIDE  104  111   CO2  19*  22   GLUCOSE  156*  127*   BUN  34*  18   CREATININE  1.01  0.72   CALCIUM  8.8  8.4*     Recent Labs      07/30/18 2058 07/31/18   0105   APTT  24.5*   --    INR  1.11  1.21*     Recent Labs      07/30/18 2058   BNPBTYPENAT  62     Recent Labs      07/30/18 2058 07/31/18 0105  08/01/18   0245   ASTSGOT  228*   --   65*   ALTSGPT  222*   --   131*   TBILIRUBIN  2.5*   --   0.9   IBILIRUBIN   --    --   0.5   DBILIRUBIN   --    --   0.4   GLOBULIN  2.3   --    --    INR  1.11  1.21*   --                Imaging  CT-ABDOMEN-PELVIS WITH   Final Result         1. There is a CBD stent. There is debris/sludge in the dilated CBD and proximal intrahepatic bile ducts. Obstruction of the stent and/or superimposed infection are possible.      2. Worsening pancreatic duct dilatation. 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 The differential includes pancreatic duct obstruction due to the stent.      3. Large right inguinal hernia containing  the stool-filled cecum.      4. Significantly enlarged prostate.      5. Sigmoid diverticulosis.      CT-CTA CHEST PULMONARY ARTERY W/ RECONS   Final Result         1. No CT evidence of pulmonary embolism. Evaluation of the subsegmental branches of the bilateral lower lobes are limited due to motion artifact.      2. Patchy bibasilar opacities, left more than right, atelectasis versus consolidation.      3. Pneumobilia and dilated biliary ducts. Correlate clinically.      DX-CHEST-PORTABLE (1 VIEW)   Final Result         1. No acute cardiopulmonary abnormalities are identified.           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        Essential hypertension- (present on admission)   Assessment & Plan    Controlled with current medication regimen.  Monitor.        Normocytic anemia- (present on admission)   Assessment & Plan    Likely due to chronic disease.  No evidence of current bleed. Transfuse if needed for hemoglobin less than 7 gm/dL          Pneumobilia- (present on admission)   Assessment & Plan    Unclear etiology, question if there is an infection in the common bile duct or biliary tree.  We have started antibiotics for the same and will continue current course at this time without alterations

## 2018-08-01 NOTE — PROGRESS NOTES
"GI Progress Note:    Federico Mckeon  Date & Time note created:    8/1/2018   12:20 PM       Patient ID:   Name:             Donato Burciaga   YOB: 1929  Age:                 89 y.o.  male   MRN:               [id number]                                                             CC: Back pain    Subjective:   Feels improved        Past Medical History:   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   • Cataract    • Dental disorder     dentures- full set   • High cholesterol    • History of chemotherapy last dose 9/2016   • History of radiation therapy last done on 09/28/2016   • Hyperlipidemia, mixed 1990   • Hypertension    • MI (myocardial infarction) (HCC)    • Snoring        Past Surgical History:  Past Surgical History:   Procedure Laterality Date   • ERCP IN OR  4/19/2018    Procedure: ERCP IN OR W/METAL STENT PLACEMENT;  Surgeon: Everett Blackwell M.D.;  Location: [facility];  Service: Gastroenterology   • CARDIAC CATH  02/21/2018    PTCA to 98% LAD, dilated to 40%, no stent.   • ERCP W/REM FB AND/OR STENT CHG N/A 12/5/2016    Procedure: ERCP W/REM FB AND/OR STENT CHG - PYLORIC STENT REMOVAL SWAP METAL STENT PLACEMENT;  Surgeon: Sunny Grove M.D.;  Location: [facility];  Service:    • GASTROSCOPY-ENDO N/A 7/11/2016    Procedure: GASTROSCOPY-ENDO;  Surgeon: Sunny Grove M.D.;  Location: [facility];  Service:    • EGD W/ENDOSCOPIC ULTRASOUND N/A 7/11/2016    Procedure: EGD W/ENDOSCOPIC ULTRASOUND - UPPER LINEAR;  Surgeon: Sunny Grove M.D.;  Location: [facility];  Service:    • ERCP W/REM FB AND/OR STENT CHG N/A 7/11/2016    Procedure: ERCP W/REM FB AND/OR STENT CHG - STENT EXCHANGE;  Surgeon: Sunny Grove M.D.;  Location: [facility];  Service:    • DIAMANTE BY LAPAROSCOPY  2/10/2016    Procedure: DIAMANTE BY LAPAROSCOPY; "  Surgeon: Elver Turcios M.D.;  Location: [facility];  Service:    • ERCP IN OR N/A 2/8/2016    Procedure: ERCP IN OR;  Surgeon: Sunny Grove M.D.;  Location: SUR[facility];  Service:        Hospital Medications:    Current Facility-Administered Medications:   •  hydrALAZINE (APRESOLINE) injection 10 mg, 10 mg, Intravenous, Q6HRS PRN, Chaparro Dhillon M.D.  •  aspirin EC (ECOTRIN) tablet 81 mg, 81 mg, Oral, DAILY, Luis Alfredo Klein M.D., 81 mg at 08/01/18 0515  •  atorvastatin (LIPITOR) tablet 10 mg, 10 mg, Oral, Q EVENING, Luis Alfredo Klein M.D., 10 mg at 07/31/18 1809  •  clopidogrel (PLAVIX) tablet 75 mg, 75 mg, Oral, DAILY, Luis Alfredo Klein M.D., 75 mg at 08/01/18 0517  •  losartan (COZAAR) tablet 50 mg, 50 mg, Oral, DAILY, Luis Alfredo Klein M.D., 50 mg at 08/01/18 0516  •  metoprolol SR (TOPROL XL) tablet 25 mg, 25 mg, Oral, DAILY, Luis Alfredo Klein M.D., 25 mg at 08/01/18 0517  •  terazosin (HYTRIN) capsule 5 mg, 5 mg, Oral, DAILY, Luis Alfredo Klein M.D., 5 mg at 08/01/18 0516  •  NS infusion, , Intravenous, Continuous, Luis Alfredo Klein M.D., Last Rate: 83 mL/hr at 07/31/18 2210  •  enoxaparin (LOVENOX) inj 40 mg, 40 mg, Subcutaneous, DAILY, Luis Alfredo Klein M.D., 40 mg at 08/01/18 0515  •  ondansetron (ZOFRAN) syringe/vial injection 4 mg, 4 mg, Intravenous, Q4HRS PRN, Luis Alfredo Klein M.D.  •  ondansetron (ZOFRAN ODT) dispertab 4 mg, 4 mg, Oral, Q4HRS PRN, Luis Alfredo Klein M.D.  •  senna-docusate (PERICOLACE or SENOKOT S) 8.6-50 MG per tablet 2 Tab, 2 Tab, Oral, BID, 2 Tab at 08/01/18 0516 **AND** polyethylene glycol/lytes (MIRALAX) PACKET 1 Packet, 1 Packet, Oral, QDAY PRN **AND** magnesium hydroxide (MILK OF MAGNESIA) suspension 30 mL, 30 mL, Oral, QDAY PRN **AND** bisacodyl (DULCOLAX) suppository 10 mg, 10 mg, Rectal, QDAY PRN, Luis Alfredo Klein M.D.  •  acetaminophen (TYLENOL) tablet 650 mg, 650 mg, Oral, Q6HRS PRN, Luis Alfredo Klein M.D.  •  cefTRIAXone (ROCEPHIN) 1 g in  mL IVPB, 1 g,  "Intravenous, Q24HRS, Luis Alfredo Klein M.D., Stopped at 08/01/18 0544  •  metroNIDAZOLE (FLAGYL) tablet 500 mg, 500 mg, Oral, Q8HRS, Chaparro Dhillon M.D., 500 mg at 08/01/18 0516    Medication Allergy:  No Known Allergies    ROS: 12 point review unchanged except resolution of back pain    Physical Exam:  Vitals/ General Appearance:   Weight/BMI: Body mass index is 22.11 kg/m².  Blood pressure (!) 184/97, pulse 85, temperature 36.7 °C (98.1 °F), resp. rate 20, height 1.676 m (5' 6\"), weight 62.1 kg (137 lb), SpO2 96 %.  Vitals:    07/31/18 1636 07/31/18 1915 08/01/18 0357 08/01/18 0742   BP: 136/71 160/80 158/90 (!) 184/97   Pulse: 69 79 78 85   Resp: (!) 22 20 18 20   Temp: 37.3 °C (99.2 °F) 36.5 °C (97.7 °F) 36.7 °C (98 °F) 36.7 °C (98.1 °F)   SpO2:  95% 93% 96%   Weight:       Height:           Heart: RRR  Lungs: Clear  Abdomen: +BS, non tender      Lab Data Review:  Recent Labs      07/30/18 2058 08/01/18   0245   WBC  12.5*  8.1   RBC  3.55*  3.41*   HEMOGLOBIN  10.9*  10.4*   HEMATOCRIT  32.4*  31.6*   MCV  91.3  92.7   MCH  30.7  30.5   MCHC  33.6*  32.9*   RDW  47.6  49.0   PLATELETCT  171  141*   MPV  10.0  9.9     Recent Labs      07/30/18 2058 08/01/18   0245   SODIUM  136  138   POTASSIUM  3.9  3.7   CHLORIDE  104  111   CO2  19*  22   GLUCOSE  156*  127*   BUN  34*  18   CREATININE  1.01  0.72   CALCIUM  8.8  8.4*     Recent Labs      07/30/18 2058 07/31/18   0105   APTT  24.5*   --    INR  1.11  1.21*     Recent Labs      07/30/18 2058   BNPBTYPENAT  62     Recent Labs      07/30/18 2058   TROPONINI  0.02   BNPBTYPENAT  62     Recent Labs      07/30/18 2058 08/01/18   0245   SODIUM  136  138   POTASSIUM  3.9  3.7   CHLORIDE  104  111   CO2  19*  22   GLUCOSE  156*  127*   BUN  34*  18     Recent Labs      07/30/18 2058 08/01/18   0245   SODIUM  136  138   POTASSIUM  3.9  3.7   CHLORIDE  104  111   CO2  19*  22   BUN  34*  18   CREATININE  1.01  0.72   CALCIUM  8.8  8.4*     Recent " Labs      07/30/18 2058 07/31/18   0105   APTT  24.5*   --    INR  1.11  1.21*          Imaging/Procedures Review:        Assessment and plan:  1. Abnormal LFT's- improved but has debri in CBD on CT scan. Would benefit from ERCP and will set up tentatively for Friday. Pt off Plavix and will hold ASA for ERCP (exact time to be determined)  2.Cholangitis- better on antibiotics  3. 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 Leukocytosis- improving  6. CAD- Plavix being held, pt with hx coronary stents  7. Anemia of chronic disease- stable  8. Right inguinal hernia

## 2018-08-01 NOTE — FACE TO FACE
Face to Face Supporting Documentation - Home Health    The encounter with this patient was in whole or in part the primary reason for home health admission.    Date of encounter:   Patient:                    MRN:                       YOB: 2018  Donato Burciaga  [id number]  5/27/1929     Home health to see patient for:  Skilled Nursing care for assessment, interventions & education, Physical Therapy evaluation and treatment and Occupational therapy evaluation and treatment    Skilled need for:  Comment: resume home health    Skilled nursing interventions to include:  Comment: needs help with therapies    Homebound status evidenced by:  Need the aid of supportive devices such as crutches, canes, wheelchairs or walkers. Leaving home requires a considerable and taxing effort. There is a normal inability to leave the home.    Community Physician to provide follow up care: Fabiano Burk M.D.     Optional Interventions? No      I certify the face to face encounter for this home health care referral meets the CMS requirements and the encounter/clinical assessment with the patient was, in whole, or in part, for the medical condition(s) listed above, which is the primary reason for home health care. Based on my clinical findings: the service(s) are medically necessary, support the need for home health care, and the homebound criteria are met.  I certify that this patient has had a face to face encounter by myself.  Chaparro Dhillon M.D. - NPI: [id number]

## 2018-08-02 LAB
ALBUMIN SERPL BCP-MCNC: 3.4 G/DL (ref 3.2–4.9)
ALBUMIN/GLOB SERPL: 1.4 G/DL
ALP SERPL-CCNC: 491 U/L (ref 30–99)
ALT SERPL-CCNC: 127 U/L (ref 2–50)
ANION GAP SERPL CALC-SCNC: 8 MMOL/L (ref 0–11.9)
AST SERPL-CCNC: 68 U/L (ref 12–45)
BILIRUB SERPL-MCNC: 3.3 MG/DL (ref 0.1–1.5)
BUN SERPL-MCNC: 11 MG/DL (ref 8–22)
CALCIUM SERPL-MCNC: 8.6 MG/DL (ref 8.5–10.5)
CHLORIDE SERPL-SCNC: 107 MMOL/L (ref 96–112)
CO2 SERPL-SCNC: 21 MMOL/L (ref 20–33)
CREAT SERPL-MCNC: 0.62 MG/DL (ref 0.5–1.4)
GLOBULIN SER CALC-MCNC: 2.5 G/DL (ref 1.9–3.5)
GLUCOSE SERPL-MCNC: 113 MG/DL (ref 65–99)
POTASSIUM SERPL-SCNC: 3.9 MMOL/L (ref 3.6–5.5)
PROT SERPL-MCNC: 5.9 G/DL (ref 6–8.2)
SODIUM SERPL-SCNC: 136 MMOL/L (ref 135–145)

## 2018-08-02 PROCEDURE — 770006 HCHG ROOM/CARE - MED/SURG/GYN SEMI*

## 2018-08-02 PROCEDURE — 700111 HCHG RX REV CODE 636 W/ 250 OVERRIDE (IP): Performed by: HOSPITALIST

## 2018-08-02 PROCEDURE — 700102 HCHG RX REV CODE 250 W/ 637 OVERRIDE(OP): Performed by: INTERNAL MEDICINE

## 2018-08-02 PROCEDURE — 700105 HCHG RX REV CODE 258: Performed by: HOSPITALIST

## 2018-08-02 PROCEDURE — 99232 SBSQ HOSP IP/OBS MODERATE 35: CPT | Performed by: INTERNAL MEDICINE

## 2018-08-02 PROCEDURE — 700102 HCHG RX REV CODE 250 W/ 637 OVERRIDE(OP): Performed by: HOSPITALIST

## 2018-08-02 PROCEDURE — 80053 COMPREHEN METABOLIC PANEL: CPT

## 2018-08-02 PROCEDURE — 36415 COLL VENOUS BLD VENIPUNCTURE: CPT

## 2018-08-02 PROCEDURE — A9270 NON-COVERED ITEM OR SERVICE: HCPCS | Performed by: INTERNAL MEDICINE

## 2018-08-02 PROCEDURE — A9270 NON-COVERED ITEM OR SERVICE: HCPCS | Performed by: HOSPITALIST

## 2018-08-02 PROCEDURE — 700111 HCHG RX REV CODE 636 W/ 250 OVERRIDE (IP): Performed by: INTERNAL MEDICINE

## 2018-08-02 RX ADMIN — METOPROLOL SUCCINATE 25 MG: 25 TABLET, EXTENDED RELEASE ORAL at 05:31

## 2018-08-02 RX ADMIN — STANDARDIZED SENNA CONCENTRATE AND DOCUSATE SODIUM 2 TABLET: 8.6; 5 TABLET, FILM COATED ORAL at 05:31

## 2018-08-02 RX ADMIN — METRONIDAZOLE 500 MG: 500 TABLET ORAL at 14:18

## 2018-08-02 RX ADMIN — SODIUM CHLORIDE: 9 INJECTION, SOLUTION INTRAVENOUS at 16:05

## 2018-08-02 RX ADMIN — ATORVASTATIN CALCIUM 10 MG: 10 TABLET, FILM COATED ORAL at 18:32

## 2018-08-02 RX ADMIN — CEFTRIAXONE SODIUM 1 G: 1 INJECTION, POWDER, FOR SOLUTION INTRAMUSCULAR; INTRAVENOUS at 05:30

## 2018-08-02 RX ADMIN — TERAZOSIN HYDROCHLORIDE ANHYDROUS 5 MG: 5 CAPSULE ORAL at 05:31

## 2018-08-02 RX ADMIN — LOSARTAN POTASSIUM 50 MG: 50 TABLET ORAL at 05:31

## 2018-08-02 RX ADMIN — ENOXAPARIN SODIUM 40 MG: 100 INJECTION SUBCUTANEOUS at 05:30

## 2018-08-02 RX ADMIN — STANDARDIZED SENNA CONCENTRATE AND DOCUSATE SODIUM 2 TABLET: 8.6; 5 TABLET, FILM COATED ORAL at 18:32

## 2018-08-02 RX ADMIN — METRONIDAZOLE 500 MG: 500 TABLET ORAL at 05:31

## 2018-08-02 RX ADMIN — HYDRALAZINE HYDROCHLORIDE 10 MG: 20 INJECTION INTRAMUSCULAR; INTRAVENOUS at 04:27

## 2018-08-02 ASSESSMENT — ENCOUNTER SYMPTOMS
BRUISES/BLEEDS EASILY: 0
DIARRHEA: 0
FEVER: 0
COUGH: 0
FOCAL WEAKNESS: 0
BACK PAIN: 0
DIZZINESS: 0
NAUSEA: 0
LOSS OF CONSCIOUSNESS: 0
DEPRESSION: 0
ABDOMINAL PAIN: 0
VOMITING: 0
DOUBLE VISION: 0

## 2018-08-02 ASSESSMENT — PAIN SCALES - GENERAL: PAINLEVEL_OUTOF10: 0

## 2018-08-02 NOTE — PROGRESS NOTES
Report received from night RN, assumed Care.   Patient is AOx4, responds appropriately.      Declines pain.  Patient is tolerating regular diet, denies nausea/vomiting. + flatus  Up standby with steady gait using FWW. Up on one walk today.   L arm tremor- baseline.   R eye pink and small drainage.  Refusing chair for meals, sits up at 90 in bed.  Encouraged deep breathing.  Wife at bedside.     Plan of care discussed, all questions answered.    Explained importance of calling before getting OOB and pt verbalizes understanding.       Call light and belongings within reach, treaded slipper socks on, bed in lowest locked position.  Hourly rounding in place, all needs met at this time

## 2018-08-02 NOTE — DISCHARGE PLANNING
Anticipated Discharge Disposition: [facility]    Action: Met with pt at bedside to discussed d/c planning and completed assessment. Pt asked that this writer call his wife June #[phone]. LSW called pt's wife June and obtained information for assessment and discussed d/c planning. Pt has been on services with HC and wife is agreeable to resume these services. Pt has a FWW and cane, wife and pt do not drive but neighbors assist or they take a taxi. [facility]. Wife states they have services through [facility] as well. Discussed  IMM with wife over the phone and she provided verbal.     Faxed choice form to [facility] for [facility] to process referral.     [facility] has accepted pt.     Barriers to Discharge: N/A.     Plan: As above, awaiting medical clearance. [facility] has accepted pt.       Care Transition Team Assessment    Information Source  Orientation : Oriented x 4  Information Given By: Patient  Informant's Name: Donato  Who is responsible for making decisions for patient? : Patient         Elopement Risk  Legal Hold: No  Ambulatory or Self Mobile in Wheelchair: Yes  Disoriented: No  Psychiatric Symptoms: None  History of Wandering: No  Elopement this Admit: No  Vocalizing Wanting to Leave: No  Displays Behaviors, Body Language Wanting to Leave: No-Not at Risk for Elopement  Elopement Risk: Not at Risk for Elopement    Interdisciplinary Discharge Planning  Does Admitting Nurse Feel This Could be a Complex Discharge?: No  Primary Care Physician:   Lives with - Patient's Self Care Capacity: Significant Other  Patient or legal guardian wants to designate a caregiver (see row info): No  Support Systems: Friends / Neighbors, Children, Spouse / Significant Other, Home Care Staff  Housing / Facility: [address]  Name of Care Facility: Home  Do You Take your Prescribed Medications Regularly: Yes  Able to Return to Previous ADL's: Yes  Mobility Issues: Yes  Prior Services: Housekeeping / Homemaker  Services  Patient Expects to be Discharged to:: home  Assistance Needed: Yes  Durable Medical Equipment: Walker, Other - Specify (cane)  DME Provider / Phone: Not sure of the walker company.     Discharge Preparedness  What is your plan after discharge?: Home with help, Home health care  What are your discharge supports?: Spouse (neighbors)  Prior Functional Level: Ambulatory, Independent with Activities of Daily Living, Independent with Medication Management  Difficulity with ADLs: None  Difficulity with IADLs: Driving    Functional Assesment  Prior Functional Level: Ambulatory, Independent with Activities of Daily Living, Independent with Medication Management    Finances  Financial Barriers to Discharge: No  Prescription Coverage: Yes    Vision / Hearing Impairment  Vision Impairment : Yes  Right Eye Vision: Wears Glasses  Left Eye Vision: Wears Glasses  Hearing Impairment : No    Values / Beliefs / Concerns  Values / Beliefs Concerns : No  Special Hospitalization Concerns: None         Domestic Abuse  Have you ever been the victim of abuse or violence?: No  Physical Abuse or Sexual Abuse: No  Verbal Abuse or Emotional Abuse: No  Possible Abuse Reported to:: Not Applicable         Discharge Risks or Barriers  Discharge risks or barriers?: Complex medical needs  Patient risk factors: Complex medical needs    Anticipated Discharge Information  Anticipated discharge disposition: [facility]  Discharge Address: [address]   Discharge Contact Phone Number: [phone]

## 2018-08-02 NOTE — CARE PLAN
Problem: Safety  Goal: Will remain free from injury  Patient is a low fall risk. Patient educated to call for assistance. Call light left within reach of patient.     Problem: Infection  Goal: Will remain free from infection    Intervention: Implement standard precautions and perform hand washing before and after patient contact  Hand hygiene performed prior to and after entering pt room. Gloves worn during patient interactions.

## 2018-08-02 NOTE — PROGRESS NOTES
Bedside report completed.  A&O x4.  In no acute distress.   VSS.  SpO2 100% on RA.  Ambulating with standby assistance and use of a front wheel walker  Tolerating cardiac diet, denies N/V.  declines pain  Skin: intact approximated.    Call light and personal belongings within reach. POC discussed and all questions answered.  Hourly rounding in place.  No additional needs at this time.

## 2018-08-02 NOTE — PROGRESS NOTES
"Hospital Medicine Daily Progress Note    Date of Service  8/2/2018    Chief Complaint  89 y.o. male admitted 7/30/2018 with back pain found to have an occluded CBD stent    Hospital Course  See below    Interval Problem Update  CBD stent occlusion-no abdominal pain again today. Remains afebrile and tolerating IV abx's without issue. BILI and LFT\"s increased again today. No other acute events noted per nursing staff.    Consultants/Specialty  GI    Disposition  Surgical    Review of Systems  Review of Systems   Constitutional: Negative for fever.   HENT: Negative for tinnitus.    Eyes: Negative for double vision.   Respiratory: Negative for cough.    Cardiovascular: Negative for chest pain and leg swelling.   Gastrointestinal: Negative for abdominal pain, diarrhea, nausea and vomiting.   Genitourinary: Negative for frequency and urgency.   Musculoskeletal: Negative for back pain.   Skin: Negative for rash.   Neurological: Negative for dizziness, focal weakness and loss of consciousness.   Endo/Heme/Allergies: Does not bruise/bleed easily.   Psychiatric/Behavioral: Negative for depression.   All other systems reviewed and are negative.       Physical Exam  Blood Pressure : 146/82   Temperature: 36.7 °C (98.1 °F)   Pulse: 76   Respiration: 20   Pulse Oximetry: 93 %     Physical Exam   Constitutional: He is oriented to person, place, and time. He appears well-developed and well-nourished. No distress.   HENT:   Head: Normocephalic and atraumatic.   Eyes: Pupils are equal, round, and reactive to light. No scleral icterus.   Neck: Normal range of motion. Neck supple.   Cardiovascular: Normal rate, regular rhythm, normal heart sounds and intact distal pulses.    No murmur heard.  Pulmonary/Chest: Effort normal. No stridor. No respiratory distress.   Abdominal: Soft. Bowel sounds are normal. He exhibits no distension. There is no tenderness. There is no rebound.   Musculoskeletal: Normal range of motion. He exhibits no " edema.   Neurological: He is alert and oriented to person, place, and time. No cranial nerve deficit.   Skin: Skin is warm and dry. No rash noted.   Psychiatric: He has a normal mood and affect.   Nursing note and vitals reviewed.      Fluids    Intake/Output Summary (Last 24 hours) at 08/02/18 1420  Last data filed at 08/02/18 1316   Gross per 24 hour   Intake              350 ml   Output             1150 ml   Net             -800 ml       Laboratory  Recent Labs      07/30/18 2058 08/01/18   0245   WBC  12.5*  8.1   RBC  3.55*  3.41*   HEMOGLOBIN  10.9*  10.4*   HEMATOCRIT  32.4*  31.6*   MCV  91.3  92.7   MCH  30.7  30.5   MCHC  33.6*  32.9*   RDW  47.6  49.0   PLATELETCT  171  141*   MPV  10.0  9.9     Recent Labs      07/30/18 2058 08/01/18 0245 08/02/18   0430   SODIUM  136  138  136   POTASSIUM  3.9  3.7  3.9   CHLORIDE  104  111  107   CO2  19*  22  21   GLUCOSE  156*  127*  113*   BUN  34*  18  11   CREATININE  1.01  0.72  0.62   CALCIUM  8.8  8.4*  8.6     Recent Labs      07/30/18 2058 07/31/18   0105   APTT  24.5*   --    INR  1.11  1.21*     Recent Labs      07/30/18 2058   BNPBTYPENAT  62     Recent Labs      07/30/18 2058 07/31/18 0105 08/01/18 0245 08/02/18   0430   ASTSGOT  228*   --   65*  68*   ALTSGPT  222*   --   131*  127*   TBILIRUBIN  2.5*   --   0.9  3.3*   IBILIRUBIN   --    --   0.5   --    DBILIRUBIN   --    --   0.4   --    GLOBULIN  2.3   --    --   2.5   INR  1.11  1.21*   --    --                Imaging  CT-ABDOMEN-PELVIS WITH   Final Result         1. There is a CBD stent. There is debris/sludge in the dilated CBD and proximal intrahepatic bile ducts. Obstruction of the stent and/or superimposed infection are possible.      2. Worsening pancreatic duct dilatation. 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 The differential includes pancreatic duct obstruction due to the stent.      3. Large right inguinal  hernia containing the stool-filled cecum.      4. Significantly enlarged prostate.      5. Sigmoid diverticulosis.      CT-CTA CHEST PULMONARY ARTERY W/ RECONS   Final Result         1. No CT evidence of pulmonary embolism. Evaluation of the subsegmental branches of the bilateral lower lobes are limited due to motion artifact.      2. Patchy bibasilar opacities, left more than right, atelectasis versus consolidation.      3. Pneumobilia and dilated biliary ducts. Correlate clinically.      DX-CHEST-PORTABLE (1 VIEW)   Final Result         1. No acute cardiopulmonary abnormalities are identified.           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        Essential hypertension- (present on admission)   Assessment & Plan    Controlled with current medication regimen.  Monitor.        Normocytic anemia- (present on admission)   Assessment & Plan    Likely due to chronic disease.  No evidence of current bleed. Transfuse if needed for hemoglobin less than 7 gm/dL          Pneumobilia- (present on admission)   Assessment & Plan    Unclear etiology, question if there is an infection in the common bile duct or biliary tree.  We have started antibiotics for the same and will continue current course at this time without alterations

## 2018-08-03 ENCOUNTER — APPOINTMENT (OUTPATIENT)
Dept: RADIOLOGY | Facility: MEDICAL CENTER | Age: 83
DRG: 920 | End: 2018-08-03
Attending: INTERNAL MEDICINE
Payer: MEDICARE

## 2018-08-03 LAB
ALBUMIN SERPL BCP-MCNC: 2.9 G/DL (ref 3.2–4.9)
ALBUMIN/GLOB SERPL: 1.5 G/DL
ALP SERPL-CCNC: 387 U/L (ref 30–99)
ALT SERPL-CCNC: 81 U/L (ref 2–50)
ANION GAP SERPL CALC-SCNC: 4 MMOL/L (ref 0–11.9)
AST SERPL-CCNC: 30 U/L (ref 12–45)
BILIRUB SERPL-MCNC: 1.5 MG/DL (ref 0.1–1.5)
BUN SERPL-MCNC: 14 MG/DL (ref 8–22)
CALCIUM SERPL-MCNC: 8.2 MG/DL (ref 8.5–10.5)
CHLORIDE SERPL-SCNC: 112 MMOL/L (ref 96–112)
CO2 SERPL-SCNC: 21 MMOL/L (ref 20–33)
CREAT SERPL-MCNC: 0.58 MG/DL (ref 0.5–1.4)
GLOBULIN SER CALC-MCNC: 2 G/DL (ref 1.9–3.5)
GLUCOSE SERPL-MCNC: 109 MG/DL (ref 65–99)
POTASSIUM SERPL-SCNC: 3.4 MMOL/L (ref 3.6–5.5)
PROT SERPL-MCNC: 4.9 G/DL (ref 6–8.2)
SODIUM SERPL-SCNC: 137 MMOL/L (ref 135–145)

## 2018-08-03 PROCEDURE — 500066 HCHG BITE BLOCK, ECT: Performed by: INTERNAL MEDICINE

## 2018-08-03 PROCEDURE — A9270 NON-COVERED ITEM OR SERVICE: HCPCS | Performed by: HOSPITALIST

## 2018-08-03 PROCEDURE — 700102 HCHG RX REV CODE 250 W/ 637 OVERRIDE(OP): Performed by: INTERNAL MEDICINE

## 2018-08-03 PROCEDURE — 110371 HCHG SHELL REV 272: Performed by: INTERNAL MEDICINE

## 2018-08-03 PROCEDURE — 700111 HCHG RX REV CODE 636 W/ 250 OVERRIDE (IP): Performed by: HOSPITALIST

## 2018-08-03 PROCEDURE — 160009 HCHG ANES TIME/MIN: Performed by: INTERNAL MEDICINE

## 2018-08-03 PROCEDURE — 700105 HCHG RX REV CODE 258: Performed by: HOSPITALIST

## 2018-08-03 PROCEDURE — 700102 HCHG RX REV CODE 250 W/ 637 OVERRIDE(OP): Performed by: HOSPITALIST

## 2018-08-03 PROCEDURE — 700111 HCHG RX REV CODE 636 W/ 250 OVERRIDE (IP): Performed by: INTERNAL MEDICINE

## 2018-08-03 PROCEDURE — 0F798DZ DILATION OF COMMON BILE DUCT WITH INTRALUMINAL DEVICE, VIA NATURAL OR ARTIFICIAL OPENING ENDOSCOPIC: ICD-10-PCS | Performed by: INTERNAL MEDICINE

## 2018-08-03 PROCEDURE — 160035 HCHG PACU - 1ST 60 MINS PHASE I: Performed by: INTERNAL MEDICINE

## 2018-08-03 PROCEDURE — C1769 GUIDE WIRE: HCPCS | Performed by: INTERNAL MEDICINE

## 2018-08-03 PROCEDURE — 0FC98ZZ EXTIRPATION OF MATTER FROM COMMON BILE DUCT, VIA NATURAL OR ARTIFICIAL OPENING ENDOSCOPIC: ICD-10-PCS | Performed by: INTERNAL MEDICINE

## 2018-08-03 PROCEDURE — 80053 COMPREHEN METABOLIC PANEL: CPT

## 2018-08-03 PROCEDURE — C1874 STENT, COATED/COV W/DEL SYS: HCPCS | Performed by: INTERNAL MEDICINE

## 2018-08-03 PROCEDURE — 700101 HCHG RX REV CODE 250

## 2018-08-03 PROCEDURE — 160002 HCHG RECOVERY MINUTES (STAT): Performed by: INTERNAL MEDICINE

## 2018-08-03 PROCEDURE — 99232 SBSQ HOSP IP/OBS MODERATE 35: CPT | Performed by: INTERNAL MEDICINE

## 2018-08-03 PROCEDURE — 36415 COLL VENOUS BLD VENIPUNCTURE: CPT

## 2018-08-03 PROCEDURE — 700111 HCHG RX REV CODE 636 W/ 250 OVERRIDE (IP)

## 2018-08-03 PROCEDURE — BF111ZZ FLUOROSCOPY OF BILIARY AND PANCREATIC DUCTS USING LOW OSMOLAR CONTRAST: ICD-10-PCS | Performed by: INTERNAL MEDICINE

## 2018-08-03 PROCEDURE — 770006 HCHG ROOM/CARE - MED/SURG/GYN SEMI*

## 2018-08-03 PROCEDURE — 160048 HCHG OR STATISTICAL LEVEL 1-5: Performed by: INTERNAL MEDICINE

## 2018-08-03 PROCEDURE — A9270 NON-COVERED ITEM OR SERVICE: HCPCS | Performed by: INTERNAL MEDICINE

## 2018-08-03 PROCEDURE — 160208 HCHG ENDO MINUTES - EA ADDL 1 MIN LEVEL 4: Performed by: INTERNAL MEDICINE

## 2018-08-03 PROCEDURE — 160203 HCHG ENDO MINUTES - 1ST 30 MINS LEVEL 4: Performed by: INTERNAL MEDICINE

## 2018-08-03 PROCEDURE — 74328 X-RAY BILE DUCT ENDOSCOPY: CPT

## 2018-08-03 DEVICE — STENT WALLFLEX BILIARY RX FC RMV US 10X60: Type: IMPLANTABLE DEVICE | Status: FUNCTIONAL

## 2018-08-03 RX ORDER — SODIUM CHLORIDE, SODIUM LACTATE, POTASSIUM CHLORIDE, CALCIUM CHLORIDE 600; 310; 30; 20 MG/100ML; MG/100ML; MG/100ML; MG/100ML
INJECTION, SOLUTION INTRAVENOUS CONTINUOUS
Status: DISCONTINUED | OUTPATIENT
Start: 2018-08-03 | End: 2018-08-04 | Stop reason: HOSPADM

## 2018-08-03 RX ADMIN — HYDRALAZINE HYDROCHLORIDE 10 MG: 20 INJECTION INTRAMUSCULAR; INTRAVENOUS at 04:02

## 2018-08-03 RX ADMIN — ATORVASTATIN CALCIUM 10 MG: 10 TABLET, FILM COATED ORAL at 17:35

## 2018-08-03 RX ADMIN — METOPROLOL SUCCINATE 25 MG: 25 TABLET, EXTENDED RELEASE ORAL at 06:02

## 2018-08-03 RX ADMIN — CEFTRIAXONE SODIUM 1 G: 1 INJECTION, POWDER, FOR SOLUTION INTRAMUSCULAR; INTRAVENOUS at 06:03

## 2018-08-03 RX ADMIN — METRONIDAZOLE 500 MG: 500 TABLET ORAL at 00:02

## 2018-08-03 RX ADMIN — METRONIDAZOLE 500 MG: 500 TABLET ORAL at 06:02

## 2018-08-03 RX ADMIN — METRONIDAZOLE 500 MG: 500 TABLET ORAL at 20:32

## 2018-08-03 RX ADMIN — SODIUM CHLORIDE: 9 INJECTION, SOLUTION INTRAVENOUS at 20:32

## 2018-08-03 RX ADMIN — SODIUM CHLORIDE: 9 INJECTION, SOLUTION INTRAVENOUS at 04:03

## 2018-08-03 RX ADMIN — TERAZOSIN HYDROCHLORIDE ANHYDROUS 5 MG: 5 CAPSULE ORAL at 06:02

## 2018-08-03 RX ADMIN — STANDARDIZED SENNA CONCENTRATE AND DOCUSATE SODIUM 2 TABLET: 8.6; 5 TABLET, FILM COATED ORAL at 06:03

## 2018-08-03 RX ADMIN — LOSARTAN POTASSIUM 50 MG: 50 TABLET ORAL at 06:02

## 2018-08-03 RX ADMIN — SODIUM CHLORIDE, SODIUM LACTATE, POTASSIUM CHLORIDE, CALCIUM CHLORIDE: 600; 310; 30; 20 INJECTION, SOLUTION INTRAVENOUS at 12:30

## 2018-08-03 RX ADMIN — METRONIDAZOLE 500 MG: 500 TABLET ORAL at 16:05

## 2018-08-03 ASSESSMENT — PAIN SCALES - GENERAL
PAINLEVEL_OUTOF10: 0

## 2018-08-03 ASSESSMENT — ENCOUNTER SYMPTOMS
BACK PAIN: 0
DEPRESSION: 0
NAUSEA: 0
COUGH: 0
DOUBLE VISION: 0
DIZZINESS: 0
BRUISES/BLEEDS EASILY: 0
VOMITING: 0
ABDOMINAL PAIN: 0
FOCAL WEAKNESS: 0
FEVER: 0
LOSS OF CONSCIOUSNESS: 0
DIARRHEA: 0

## 2018-08-03 NOTE — OR NURSING
1410-Received from OR via [employer]. S/P egd with stent.  Bedside report received from RN. And Anesthesiologist.    1430-awake but resting. Denies pain. 02 at 2L nc.    1500-report called to STEFFANY Do.

## 2018-08-03 NOTE — PROGRESS NOTES
Hospital Medicine Daily Progress Note    Date of Service  8/3/2018    Chief Complaint  89 y.o. male admitted 7/30/2018 with back pain found to have an occluded CBD stent    Hospital Course  See below    Interval Problem Update  CBD stent occlusion-continues with no abdominal pain. No acute events noted per nursing staff. Afebrile. Liver labs continue to fluctuate. Going for ERCP today, found to have completely occluded stent. New stent placed and bile duct balloon extraction of material.     Consultants/Specialty  GI    Disposition  Surgical    Review of Systems  Review of Systems   Constitutional: Negative for fever.   HENT: Negative for tinnitus.    Eyes: Negative for double vision.   Respiratory: Negative for cough.    Cardiovascular: Negative for chest pain and leg swelling.   Gastrointestinal: Negative for abdominal pain, diarrhea, nausea and vomiting.   Genitourinary: Negative for frequency and urgency.   Musculoskeletal: Negative for back pain.   Skin: Negative for rash.   Neurological: Negative for dizziness, focal weakness and loss of consciousness.   Endo/Heme/Allergies: Does not bruise/bleed easily.   Psychiatric/Behavioral: Negative for depression.   All other systems reviewed and are negative.   No changes noted to above    Physical Exam  Blood Pressure : 146/82   Temperature: 36.7 °C (98.1 °F)   Pulse: 76   Respiration: 20   Pulse Oximetry: 93 %     Physical Exam   Constitutional: He is oriented to person, place, and time. He appears well-developed and well-nourished. No distress.   HENT:   Head: Normocephalic and atraumatic.   Eyes: Pupils are equal, round, and reactive to light. Right eye exhibits no discharge. Left eye exhibits no discharge. Scleral icterus (very mild) is present.   Neck: Normal range of motion. Neck supple.   Cardiovascular: Normal rate, regular rhythm, normal heart sounds and intact distal pulses.    No murmur heard.  Pulmonary/Chest: Effort normal. No stridor. No respiratory  distress.   Abdominal: Soft. Bowel sounds are normal. There is no rebound and no guarding.   Musculoskeletal: Normal range of motion. He exhibits no tenderness.   Neurological: He is alert and oriented to person, place, and time. No cranial nerve deficit.   Skin: Skin is warm and dry. No rash noted.   Psychiatric: He has a normal mood and affect.   Nursing note and vitals reviewed.      Fluids    Intake/Output Summary (Last 24 hours) at 08/03/18 1623  Last data filed at 08/03/18 1500   Gross per 24 hour   Intake             1546 ml   Output             1175 ml   Net              371 ml       Laboratory  Recent Labs      08/01/18   0245   WBC  8.1   RBC  3.41*   HEMOGLOBIN  10.4*   HEMATOCRIT  31.6*   MCV  92.7   MCH  30.5   MCHC  32.9*   RDW  49.0   PLATELETCT  141*   MPV  9.9     Recent Labs      08/01/18   0245  08/02/18   0430  08/03/18   0332   SODIUM  138  136  137   POTASSIUM  3.7  3.9  3.4*   CHLORIDE  111  107  112   CO2  22  21  21   GLUCOSE  127*  113*  109*   BUN  18  11  14   CREATININE  0.72  0.62  0.58   CALCIUM  8.4*  8.6  8.2*             Recent Labs      07/30/18   2058  07/31/18   0105  08/01/18   0245  08/02/18   0430  08/03/18   0332   ASTSGOT  228*   --   65*  68*  30   ALTSGPT  222*   --   131*  127*  81*   TBILIRUBIN  2.5*   --   0.9  3.3*  1.5   IBILIRUBIN   --    --   0.5   --    --    DBILIRUBIN   --    --   0.4   --    --    GLOBULIN  2.3   --    --   2.5  2.0   INR  1.11  1.21*   --    --    --                Imaging  CT-ABDOMEN-PELVIS WITH   Final Result         1. There is a CBD stent. There is debris/sludge in the dilated CBD and proximal intrahepatic bile ducts. Obstruction of the stent and/or superimposed infection are possible.      2. Worsening pancreatic duct dilatation. 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 The differential includes pancreatic duct obstruction due to the stent.      3. Large right inguinal hernia  containing the stool-filled cecum.      4. Significantly enlarged prostate.      5. Sigmoid diverticulosis.      CT-CTA CHEST PULMONARY ARTERY W/ RECONS   Final Result         1. No CT evidence of pulmonary embolism. Evaluation of the subsegmental branches of the bilateral lower lobes are limited due to motion artifact.      2. Patchy bibasilar opacities, left more than right, atelectasis versus consolidation.      3. Pneumobilia and dilated biliary ducts. Correlate clinically.      DX-CHEST-PORTABLE (1 VIEW)   Final Result         1. No acute cardiopulmonary abnormalities are identified.      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        Essential hypertension- (present on admission)   Assessment & Plan    Controlled with current medication regimen.  Monitor.        Normocytic anemia- (present on admission)   Assessment & Plan    Likely due to chronic disease.  No evidence of current bleed. Transfuse if needed for hemoglobin less than 7 gm/dL          Pneumobilia- (present on admission)   Assessment & Plan    Unclear etiology, question if there is an infection in the common bile duct or biliary tree.  We have started antibiotics for the same and will continue current course at this time without alterations, consider converting PO soon and likely will finish a course at home

## 2018-08-03 NOTE — OR NURSING
1515- Transport here to take pt to room, chart and belongings transported with pt.  Pt's wife transported via w/c.

## 2018-08-03 NOTE — PROGRESS NOTES
Report received.  Assumed Care.  Pt in bed, 428 1. AOx4, responds appropriately.  Denies pain, SOB.  Plan of care discussed.  Explained importance of calling before getting OOB and pt verbalizes understanding.  Call light and belongings within reach, treaded slipper socks on, bed alarm in use, bed in lowest position.  Will monitor frequently.

## 2018-08-03 NOTE — OR NURSING
1200- pt arrived in hospital with spouse, A,A, & O times place, person and event. VSS, Pt denies pain, placed into a surgical gown, LR 1 liter started to pts Right FA IV placed PTA. Reviewed the plan of care with the pt and his spouse. Urinal given to pt.     1315-200 cc-jasper colored urine in urinal.

## 2018-08-03 NOTE — OR SURGEON
Immediate Post OP Note    PreOp Diagnosis: Bile duct obstruction    PostOp Diagnosis: Same    Procedure(s):  ERCP IN OR    Surgeon(s):  Amanuel Escamilla M.D.    Anesthesiologist/Type of Anesthesia:  Anesthesiologist: Federico Arguelles M.D./* No anesthesia type entered *    Surgical Staff:  Circulator: Sheila Bell R.N.  Endoscopy Technician: Padmini Rosario  Radiology Technologist: FRANCISCO Wallace    Specimens removed if any:  * No specimens in log *    Dr. 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

## 2018-08-04 VITALS
BODY MASS INDEX: 19.7 KG/M2 | DIASTOLIC BLOOD PRESSURE: 76 MMHG | OXYGEN SATURATION: 91 % | HEART RATE: 73 BPM | HEIGHT: 66 IN | WEIGHT: 122.58 LBS | RESPIRATION RATE: 18 BRPM | TEMPERATURE: 98.4 F | SYSTOLIC BLOOD PRESSURE: 174 MMHG

## 2018-08-04 PROBLEM — K83.8 PNEUMOBILIA: Status: RESOLVED | Noted: 2018-07-31 | Resolved: 2018-08-04

## 2018-08-04 LAB
ALBUMIN SERPL BCP-MCNC: 3.1 G/DL (ref 3.2–4.9)
ALBUMIN/GLOB SERPL: 1.5 G/DL
ALP SERPL-CCNC: 384 U/L (ref 30–99)
ALT SERPL-CCNC: 67 U/L (ref 2–50)
ANION GAP SERPL CALC-SCNC: 7 MMOL/L (ref 0–11.9)
AST SERPL-CCNC: 23 U/L (ref 12–45)
BILIRUB SERPL-MCNC: 1 MG/DL (ref 0.1–1.5)
BUN SERPL-MCNC: 20 MG/DL (ref 8–22)
CALCIUM SERPL-MCNC: 8.4 MG/DL (ref 8.5–10.5)
CHLORIDE SERPL-SCNC: 113 MMOL/L (ref 96–112)
CO2 SERPL-SCNC: 22 MMOL/L (ref 20–33)
CREAT SERPL-MCNC: 0.72 MG/DL (ref 0.5–1.4)
GLOBULIN SER CALC-MCNC: 2.1 G/DL (ref 1.9–3.5)
GLUCOSE SERPL-MCNC: 146 MG/DL (ref 65–99)
POTASSIUM SERPL-SCNC: 3.5 MMOL/L (ref 3.6–5.5)
PROT SERPL-MCNC: 5.2 G/DL (ref 6–8.2)
SODIUM SERPL-SCNC: 142 MMOL/L (ref 135–145)

## 2018-08-04 PROCEDURE — 700105 HCHG RX REV CODE 258: Performed by: HOSPITALIST

## 2018-08-04 PROCEDURE — 80053 COMPREHEN METABOLIC PANEL: CPT

## 2018-08-04 PROCEDURE — 36415 COLL VENOUS BLD VENIPUNCTURE: CPT

## 2018-08-04 PROCEDURE — A9270 NON-COVERED ITEM OR SERVICE: HCPCS | Performed by: HOSPITALIST

## 2018-08-04 PROCEDURE — A9270 NON-COVERED ITEM OR SERVICE: HCPCS | Performed by: INTERNAL MEDICINE

## 2018-08-04 PROCEDURE — 700102 HCHG RX REV CODE 250 W/ 637 OVERRIDE(OP): Performed by: HOSPITALIST

## 2018-08-04 PROCEDURE — 700111 HCHG RX REV CODE 636 W/ 250 OVERRIDE (IP): Performed by: HOSPITALIST

## 2018-08-04 PROCEDURE — 700102 HCHG RX REV CODE 250 W/ 637 OVERRIDE(OP): Performed by: INTERNAL MEDICINE

## 2018-08-04 RX ORDER — CIPROFLOXACIN 500 MG/1
500 TABLET, FILM COATED ORAL 2 TIMES DAILY
Qty: 10 TAB | Refills: 0 | Status: SHIPPED | OUTPATIENT
Start: 2018-08-04 | End: 2018-08-09

## 2018-08-04 RX ORDER — METRONIDAZOLE 500 MG/1
500 TABLET ORAL EVERY 8 HOURS
Qty: 15 TAB | Refills: 0 | Status: SHIPPED | OUTPATIENT
Start: 2018-08-04 | End: 2018-08-04

## 2018-08-04 RX ORDER — METRONIDAZOLE 500 MG/1
500 TABLET ORAL EVERY 8 HOURS
Qty: 15 TAB | Refills: 0 | Status: SHIPPED | OUTPATIENT
Start: 2018-08-04 | End: 2018-08-09

## 2018-08-04 RX ORDER — CIPROFLOXACIN 500 MG/1
500 TABLET, FILM COATED ORAL 2 TIMES DAILY
Qty: 10 TAB | Refills: 0 | Status: SHIPPED | OUTPATIENT
Start: 2018-08-04 | End: 2018-08-04

## 2018-08-04 RX ADMIN — TERAZOSIN HYDROCHLORIDE ANHYDROUS 5 MG: 5 CAPSULE ORAL at 05:09

## 2018-08-04 RX ADMIN — LOSARTAN POTASSIUM 50 MG: 50 TABLET ORAL at 05:09

## 2018-08-04 RX ADMIN — METOPROLOL SUCCINATE 25 MG: 25 TABLET, EXTENDED RELEASE ORAL at 05:09

## 2018-08-04 RX ADMIN — METRONIDAZOLE 500 MG: 500 TABLET ORAL at 05:09

## 2018-08-04 RX ADMIN — METRONIDAZOLE 500 MG: 500 TABLET ORAL at 15:12

## 2018-08-04 RX ADMIN — CEFTRIAXONE SODIUM 1 G: 1 INJECTION, POWDER, FOR SOLUTION INTRAMUSCULAR; INTRAVENOUS at 05:10

## 2018-08-04 RX ADMIN — SODIUM CHLORIDE: 9 INJECTION, SOLUTION INTRAVENOUS at 05:06

## 2018-08-04 NOTE — PROGRESS NOTES
Bedside report received.    Assessment complete.  A&O x 4. Use of call light reinforced. Patient calls appropriately.  Patient ambualtes with FFW and standby assist.   Patient denies pain at this time.   Denies N&V. Tolerating regular, cardiac diet.  + void, + flatus  Patient denies SOB.  Patient states 'I want to go home'.    Review plan with of care with patient. Call light and personal belongings with in reach. Hourly rounding in place. All needs met at this time.

## 2018-08-04 NOTE — CARE PLAN
Problem: Safety  Goal: Will remain free from falls  Outcome: PROGRESSING AS EXPECTED  Pt remains free from fall at this time.  Pt educated on fall risk and the use of bed alarm.  Pt verbalized understanding.  CLIP, hourly rounding in place    Problem: Venous Thromboembolism (VTW)/Deep Vein Thrombosis (DVT) Prevention:  Goal: Patient will participate in Venous Thrombosis (VTE)/Deep Vein Thrombosis (DVT)Prevention Measures  Outcome: PROGRESSING AS EXPECTED   08/02/18 2019 08/03/18 2200   Mechanical/VTE Prophylaxis   Mechanical Prophylaxis  --  SCDs, Sequential Compression Device   SCDs, Sequential Compression Device --  Refused   OTHER   Pharmacologic Prophylaxis Used Contraindicated per MD --

## 2018-08-04 NOTE — PROGRESS NOTES
Gastroenterology Progress Note:    Ting-Antonia Adolfo  Date & Time note created:    8/4/2018   12:26 PM     Patient ID:  Name:             Donato Burciaga  YOB: 1929  Age:                 89 y.o.  male  MRN:               [id number]    Referring MD:  Dr. Dhillon                                                             Chief Complaint(s):      Cholangitis    History of Present Illness:    This is a very pleasant 89 y.o. male with the past medical history as listed below.  ===7/31/18 's Consult===  This patient is an 89-year-old white male who has a history ampullary cancer.  He has a history of a cholecystectomy done in 2016 and then received radiation therapy and chemotherapy postoperatively.    The family's impression was that he was cured.  The patient, however, has had an ERCP done noting a distal biliary stricture that was about 3 cm long.  Metal stent was placed and he did well until 04/2018 at which time he was admitted with cholangitis and found to have E. coli bacteremia.  At that point in time, he had an ERCP done that showed tumor ingrowth into his existing stent as well as a buildup of small stone fragments of biliary sludge proximal to this.  Another metal stent was then placed in the original stent and his common bile duct was cleared.      The patient was in his usual state of health until the night prior to admission when he developed problems with vague back pain that he would not qualify any further.  He also had fever and chills and was brought into the emergency room, noted to have abnormal liver function tests and was admitted to the hospital.  He denies any change in bowel habits or overt GI bleeding.  His weight has been gradually decreasing.  The patient's wife has noted a gradual progressive decrease in the patient's functional ability.  ===  8/1/18 Stable  8/2/2018 Feeling ok. No pain.  8/3/18 ERCP:  1.  Biliary metal stent seen in place clearly occluded.  2.   "Pancreatic duct neither injected nor cannulated.  3.  Common bile duct, occluded metal stent in place, otherwise normal course and caliber.  Mild tissue ingrowth with a benign appearance.     THERAPEUTICS:  1.  Bile duct balloon stone/sludge/pus extraction.  2.  Bile duct fully covered metal stent placement 10x60 mm.    8/4/2018 Doing fine. Feeling better. Ready to go home.     Review of Systems:      Constitutional: Denies fevers, weight changes  Eyes: Denies changes in vision, jaundice  Ears/Nose/Throat/Mouth: Denies nasal congestion or sore throat   Cardiovascular: Denies chest pain or palpitations   Respiratory: Denies shortness of breath, denies cough  Gastrointestinal/Hepatic: Denies abdominal pain, nausea, vomiting, diarrhea, constipation or GI bleeding   Genitourinary: Denies dysuria or frequency  Musculoskeletal/Rheum: Denies  joint pain and swelling, edema  Skin: Denies rash  Neurological: Denies headache, confusion, memory loss or focal weakness/parasthesias  Psychiatric: denies mood disorder   Endocrine: Jessi thyroid problems  Heme/Oncology/Lymph Nodes: Denies enlarged lymph nodes, denies brusing or known bleeding disorder  All other systems were reviewed and are negative (AMA/CMS criteria)              Past Medical History:   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   • Cataract    • Dental disorder     dentures- full set   • High cholesterol    • History of chemotherapy last dose 9/2016   • History of radiation therapy last done on 09/28/2016   • Hyperlipidemia, mixed 1990   • Hypertension    • MI (myocardial infarction) (HCC)    • Snoring      Active Hospital Problems    Diagnosis   • Coronary artery disease involving native heart without angina pectoris [I25.10]     Priority: Medium   • Essential hypertension [I10]     Priority: Medium   • Normocytic anemia [D64.9]       Past Surgical " History:  Past Surgical History:   Procedure Laterality Date   • ERCP IN OR N/A 8/3/2018    Procedure: ERCP IN OR;  Surgeon: Amanuel Escamilla M.D.;  Location: [facility] SAME DAY [facility];  Service: Gastroenterology   • ERCP IN OR  4/19/2018    Procedure: ERCP IN OR W/METAL STENT PLACEMENT;  Surgeon: Everett Blackwell M.D.;  Location: [facility];  Service: Gastroenterology   • CARDIAC CATH  02/21/2018    PTCA to 98% LAD, dilated to 40%, no stent.   • ERCP W/REM FB AND/OR STENT CHG N/A 12/5/2016    Procedure: ERCP W/REM FB AND/OR STENT CHG - PYLORIC STENT REMOVAL SWAP METAL STENT PLACEMENT;  Surgeon: Sunny Grove M.D.;  Location: [facility];  Service:    • GASTROSCOPY-ENDO N/A 7/11/2016    Procedure: GASTROSCOPY-ENDO;  Surgeon: Sunny Grove M.D.;  Location: [facility];  Service:    • EGD W/ENDOSCOPIC ULTRASOUND N/A 7/11/2016    Procedure: EGD W/ENDOSCOPIC ULTRASOUND - UPPER LINEAR;  Surgeon: Sunny Grove M.D.;  Location: [facility];  Service:    • ERCP W/REM FB AND/OR STENT CHG N/A 7/11/2016    Procedure: ERCP W/REM FB AND/OR STENT CHG - STENT EXCHANGE;  Surgeon: Sunny Grove M.D.;  Location: [facility];  Service:    • DIAMANTE BY LAPAROSCOPY  2/10/2016    Procedure: DIAMANTE BY LAPAROSCOPY;  Surgeon: Elver Turcios M.D.;  Location: [facility];  Service:    • ERCP IN OR N/A 2/8/2016    Procedure: ERCP IN OR;  Surgeon: Sunny Grove M.D.;  Location: [facility];  Service:        Hospital Medications:  Current Facility-Administered Medications   Medication Dose Frequency Provider Last Rate Last Dose   • lactated ringers infusion   Continuous Amanuel Escamilla M.D. 10 mL/hr at 08/03/18 1230     • hydrALAZINE (APRESOLINE) injection 10 mg  10 mg Q6HRS PRN Chaparro Dhillon M.D.   10 mg at 08/03/18 0402   • atorvastatin (LIPITOR) tablet 10 mg  10 mg Q EVENING Luis Alfredo Klein M.D.   10 mg at 08/03/18 1735   • losartan (COZAAR) tablet  50 mg  50 mg DAILY Luis Alfredo Klein M.D.   50 mg at 08/04/18 0509   • metoprolol SR (TOPROL XL) tablet 25 mg  25 mg DAILY Luis Alfredo Klein M.D.   25 mg at 08/04/18 0509   • terazosin (HYTRIN) capsule 5 mg  5 mg DAILY Luis Alfredo Klein M.D.   5 mg at 08/04/18 0509   • NS infusion   Continuous Luis Alfredo Klein M.D. 83 mL/hr at 08/04/18 0506     • ondansetron (ZOFRAN) syringe/vial injection 4 mg  4 mg Q4HRS PRN Luis Alfredo Klein M.D.       • ondansetron (ZOFRAN ODT) dispertab 4 mg  4 mg Q4HRS PRN Luis Alfredo Klein M.D.       • senna-docusate (PERICOLACE or SENOKOT S) 8.6-50 MG per tablet 2 Tab  2 Tab BID Luis Alfredo Klein M.D.   2 Tab at 08/03/18 0603    And   • polyethylene glycol/lytes (MIRALAX) PACKET 1 Packet  1 Packet QDAY PRN Luis Alfredo Klein M.D.        And   • magnesium hydroxide (MILK OF MAGNESIA) suspension 30 mL  30 mL QDAY PRN Luis Alfredo Klein M.D.        And   • bisacodyl (DULCOLAX) suppository 10 mg  10 mg QDAY PRN Luis Alfredo Klein M.D.       • acetaminophen (TYLENOL) tablet 650 mg  650 mg Q6HRS PRN Luis Alfredo Klein M.D.       • cefTRIAXone (ROCEPHIN) 1 g in  mL IVPB  1 g Q24HRS Luis Alfredo Klein M.D.   Stopped at 08/04/18 0540   • metroNIDAZOLE (FLAGYL) tablet 500 mg  500 mg Q8HRS Chaparro Dhillon M.D.   500 mg at 08/04/18 0509     Last reviewed on 8/3/2018 12:31 PM by Sonya Mario R.N.    Current Outpatient Medications:  Prescriptions Prior to Admission   Medication Sig Dispense Refill Last Dose   • metoprolol SR (TOPROL XL) 25 MG TABLET SR 24 HR Take 1 Tab by mouth every day. 90 Tab 3 7/11/2018   • losartan (COZAAR) 50 MG Tab Take 1 Tab by mouth every day. 90 Tab 3 7/11/2018   • atorvastatin (LIPITOR) 10 MG Tab Take 1 Tab by mouth every evening. 90 Tab 3 7/11/2018   • potassium chloride SA (KDUR) 20 MEQ Tab CR Take 1 Tab by mouth every day. 90 Tab 3 7/11/2018   • megestrol (MEGACE) 20 MG Tab Take 1 Tab by mouth every day. 90 Tab 3 7/11/2018   • terazosin (HYTRIN) 5 MG Cap Take 1 Cap by mouth every day. 90 Cap 3  "7/11/2018   • [DISCONTINUED] clopidogrel (PLAVIX) 75 MG Tab Take 1 Tab by mouth every day. 90 Tab 3 7/11/2018   • aspirin EC 81 MG EC tablet Take 1 Tab by mouth every day. (Patient taking differently: Take 81 mg by mouth every evening.) 30 Tab 3 7/11/2018   • MAGNESIUM PO Take 250 mg by mouth 1 time daily as needed (supplement).   7/11/2018   • Vitamin D, Cholecalciferol, 1000 UNITS Cap Take 2,000 Units by mouth every day at 6 PM.   7/11/2018       Medication Allergy:  No Known Allergies    Physical Exam:  Weight/BMI: Body mass index is 19.78 kg/m².  Blood pressure (!) 174/76, pulse 73, temperature 36.9 °C (98.4 °F), resp. rate 18, height 1.676 m (5' 6\"), weight 55.6 kg (122 lb 9.2 oz), SpO2 91 %.  Vitals:    08/03/18 2000 08/03/18 2350 08/04/18 0410 08/04/18 0800   BP: 135/72 (!) 165/88 (!) 166/94 (!) 174/76   Pulse: 99 77 84 73   Resp: 18 16 18 18   Temp: 37 °C (98.6 °F) 36.9 °C (98.4 °F) 37.2 °C (98.9 °F) 36.9 °C (98.4 °F)   SpO2: 94% 95% 95% 91%   Weight:       Height:         Oxygen Therapy:  Pulse Oximetry: 91 %, O2 (LPM): 0, O2 Delivery: None (Room Air)    Intake/Output Summary (Last 24 hours) at 08/04/18 1226  Last data filed at 08/04/18 0900   Gross per 24 hour   Intake             1776 ml   Output              425 ml   Net             1351 ml       Constitutional:   Well developed, malnourished, no acute distress  HEENT:  Normocephalic, Atraumatic, Conjunctiva not pale, Sclera minimal icteric, Oropharynx moist mucous membranes, No oral exudates, Nose normal.  No thyromegaly.  Neck:  Normal range of motion, No cervical tenderness,  no JVD.  Chest/Lungs:  Symmetric expansion, no spider angioma, breath sounds clear to auscultation bilaterally,  no crackles, no wheezing.   Cardiovascular:  Normal heart rate, Normal rhythm, No murmurs, No rubs, No gallops.    Abdomen: Bowel sounds normal, Soft, No tenderness, No guarding, No rebound, No masses, No hepatosplenomegaly.  Extremities: No " cyanosis/clubbing/edema/palmar erythema/flapping tremor  Skin: Warm, Dry, No erythema, No rash, no induration.    MDM (Data Review):     Records reviewed and summarized in current documentation    Lab Data Review:  Recent Results (from the past 24 hour(s))   COMP METABOLIC PANEL    Collection Time: 08/04/18  3:12 AM   Result Value Ref Range    Sodium 142 135 - 145 mmol/L    Potassium 3.5 (L) 3.6 - 5.5 mmol/L    Chloride 113 (H) 96 - 112 mmol/L    Co2 22 20 - 33 mmol/L    Anion Gap 7.0 0.0 - 11.9    Glucose 146 (H) 65 - 99 mg/dL    Bun 20 8 - 22 mg/dL    Creatinine 0.72 0.50 - 1.40 mg/dL    Calcium 8.4 (L) 8.5 - 10.5 mg/dL    AST(SGOT) 23 12 - 45 U/L    ALT(SGPT) 67 (H) 2 - 50 U/L    Alkaline Phosphatase 384 (H) 30 - 99 U/L    Total Bilirubin 1.0 0.1 - 1.5 mg/dL    Albumin 3.1 (L) 3.2 - 4.9 g/dL    Total Protein 5.2 (L) 6.0 - 8.2 g/dL    Globulin 2.1 1.9 - 3.5 g/dL    A-G Ratio 1.5 g/dL   ESTIMATED GFR    Collection Time: 08/04/18  3:12 AM   Result Value Ref Range    GFR If African American >60 >60 mL/min/1.73 m 2    GFR If Non African American >60 >60 mL/min/1.73 m 2       MDM (Assessment and Plan):     Active Hospital Problems    Diagnosis   • Coronary artery disease involving native heart without angina pectoris [I25.10]     Priority: Medium   • Essential hypertension [I10]     Priority: Medium   • Normocytic anemia [D64.9]       Imaging/Procedures Review:    7/31/18 CT:  1. There is a CBD stent. There is debris/sludge in the dilated CBD and proximal intrahepatic bile ducts. Obstruction of the stent and/or superimposed infection are possible.  2. Worsening pancreatic duct dilatation. 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 The differential includes pancreatic duct obstruction due to the stent.  3. Large right inguinal hernia containing the stool-filled cecum.  4. Significantly enlarged prostate.  5. Sigmoid diverticulosis.    Assessment and Plan  1.  Occluded biliary stent s/p stone/sludge/pus extraction and fully covered metal stent placement 10 X 60 mm.   2. Cholangitis- better on antibiotics  3. 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 Leukocytosis- improving  6. CAD- Plavix being held, pt with hx coronary stents  7. Anemia of chronic disease- stable  8. Right inguinal hernia  9. Ok to go home from GI standpoint. Resume [facility].   10. F/U with GI prn.     Thank you very much for allowing me to participate in the care of your patient.  Please feel free to contact me anytime at [phone].     Cristina Mata M.D.    Core Quality Measures   Reviewed items::  Labs, Medications and Radiology reports reviewed

## 2018-08-04 NOTE — PROGRESS NOTES
Assumed care of Mr Burciaga at 1900    Pt is A&O 4  Pain denied  Pt denies nausea  Tolerating Diet  Positive Urine Void   Positive Flatus  Positive BM Void  Up 1 assist   SCD's on  Bed in lowest position and locked.  Bed alarm on.  CLIP, Hourly rounding in place  Reviewed plan of care with patient, bed in lowest position and locked, pt resting comfortably now, call light within reach, all needs met at this time

## 2018-08-04 NOTE — DISCHARGE INSTRUCTIONS
Discharge Instructions    Discharged to home by car with relative. Discharged via wheelchair, hospital escort: Yes.  Special equipment needed: Not Applicable    Be sure to schedule a follow-up appointment with your primary care doctor or any specialists as instructed.     Discharge Plan:   Diet Plan: Discussed  Activity Level: Discussed  Confirmed Follow up Appointment: Patient to Call and Schedule Appointment  Confirmed Symptoms Management: Discussed  Medication Reconciliation Updated: Yes  Influenza Vaccine Indication: Not indicated: Previously immunized this influenza season and > 8 years of age    I understand that a diet low in cholesterol, fat, and sodium is recommended for good health. Unless I have been given specific instructions below for another diet, I accept this instruction as my diet prescription.   Other diet: Regular    Special Instructions: None    · Is patient discharged on Warfarin / Coumadin?   No     Depression / Suicide Risk    As [facility] are [facility], it is important to learn how to keep safe from harming yourself.    Recognize the warning signs:  · Abrupt changes in personality, positive or negative- including increase in energy   · Giving away possessions  · Change in eating patterns- significant weight changes-  positive or negative  · Change in sleeping patterns- unable to sleep or sleeping all the time   · Unwillingness or inability to communicate  · Depression  · Unusual sadness, discouragement and loneliness  · Talk of wanting to die  · Neglect of personal appearance   · Rebelliousness- reckless behavior  · Withdrawal from people/activities they love  · Confusion- inability to concentrate     If you or a loved one observes any of these behaviors or has concerns about self-harm, here's what you can do:  · Talk about it- your feelings and reasons for harming yourself  · Remove any means that you might use to hurt yourself (examples: pills, rope, extension  cords, firearm)  · Get professional help from the community (Mental Health, Substance Abuse, psychological counseling)  · Do not be alone:Call your Safe Contact- someone whom you trust who will be there for you.  · Call your local CRISIS HOTLINE 583-1249 or 653-266-1571  · Call your local Children's Mobile Crisis Response Team [place] (001) 989-2318 or www.B2B-Center  · Call the toll free National Suicide Prevention Hotlines   · National Suicide Prevention Lifeline 575-075-BPEY (7648)  · Practice Management e-Tools Line Network 800-SUICIDE (880-4742)    Endoscopic Retrograde Cholangiopancreatography (ERCP), Care After  Refer to this sheet in the next few weeks. These instructions provide you with information on caring for yourself after your procedure. Your health care provider may also give you more specific instructions. Your treatment has been planned according to current medical practices, but problems sometimes occur. Call your health care provider if you have any problems or questions after your procedure.   WHAT TO EXPECT AFTER THE PROCEDURE   After your procedure, it is typical to feel:   · Soreness in your throat.    · Sick to your stomach (nauseous).    · Bloated.  · Dizzy.    · Fatigued.  HOME CARE INSTRUCTIONS  · Have a friend or family member stay with you for the first 24 hours after your procedure.  · Start taking your usual medicines and eating normally as soon as you feel well enough to do so or as directed by your health care provider.  SEEK MEDICAL CARE IF:  · You have abdominal pain.    · You develop signs of infection, such as:    ¨ Chills.    ¨ Feeling unwell.    SEEK IMMEDIATE MEDICAL CARE IF:  · You have difficulty swallowing.  · You have worsening throat, chest, or abdominal pain.  · You vomit.  · You have bloody or very black stools.  · You have a fever.     This information is not intended to replace advice given to you by your health care provider. Make sure you discuss any questions you  have with your health care provider.     Document Released: 10/08/2014 Document Reviewed: 10/08/2014  Elsevier Interactive Patient Education ©2016 [employer]

## 2018-08-04 NOTE — DISCHARGE SUMMARY
"Discharge Summary    CHIEF COMPLAINT ON ADMISSION  Chief Complaint   Patient presents with   • Weakness   • Back Pain     between shoulder blades       Reason for Admission  Upper Back pain     Admission Date  7/30/2018    CODE STATUS  Full Code    HPI & HOSPITAL COURSE  This is a 89 y.o. male here with above medical issues. Patient was admitted to the general medical floor and placed on empiric IV abx's of ceftriaxone and flagyl given concern for possible cholangitis and pneumobilia. He was noted on admission CTAP with contrast to have an occluded CBD stent which was placed for known ampullary cancer. GI was consulted and patient underwent successful ERCP and placement of an elongated stent in the common bile duct with good clinical results. Patient had no belly pain and tolerated a PO diet without issue on day of discharge. His LFT\"s nearly normalized upon discharge. He will complete 5 more days of antibiotics since some mild pus was found during the ERCP procedure.       Therefore, he is discharged in fair and stable condition to home with close outpatient follow-up.    The patient met 2-midnight criteria for an inpatient stay at the time of discharge.    Discharge Date  8/4/2018    FOLLOW UP ITEMS POST DISCHARGE  F/U with the GI doctors in 4 weeks  F/U with his PCP in 1-2 weeks    DISCHARGE DIAGNOSES  Principal Problem (Resolved):    Cholangitis POA: Yes  Active Problems:    Essential hypertension POA: Yes    Coronary artery disease involving native heart without angina pectoris POA: Yes    Normocytic anemia POA: Yes  Resolved Problems:    Pneumobilia POA: Yes      FOLLOW UP  Future Appointments  Date Time Provider Department Center   8/6/2018 1:00 PM [facility] C[facility] MANAGER [address]   8/9/2018 1:00 PM Sheila Mendoza M.D. [address]   8/28/2018 1:20 PM Fabiano Burk M.D. [address]   9/6/2018 1:45 PM Tony Rodas M.D. [facility] None   2/26/2019 1:20 PM VASCULAR NURSE PRACTITIONER " [facility] None     Fabiano Burk M.D.  [address]  [address]  [place] [id number] [phone]    In 2 weeks      [facility]  Address: [place] [address]  Phone: [phone]  Call in 1 day        MEDICATIONS ON DISCHARGE     Medication List      START taking these medications      Instructions   ciprofloxacin 500 MG Tabs  Commonly known as:  CIPRO   Take 1 Tab by mouth 2 times a day for 5 days.  Dose:  500 mg     metroNIDAZOLE 500 MG Tabs  Commonly known as:  FLAGYL   Take 1 Tab by mouth every 8 hours for 5 days.  Dose:  500 mg        CHANGE how you take these medications      Instructions   aspirin 81 MG EC tablet  What changed:  when to take this   Take 1 Tab by mouth every day.  Dose:  81 mg        CONTINUE taking these medications      Instructions   atorvastatin 10 MG Tabs  Commonly known as:  LIPITOR   Take 1 Tab by mouth every evening.  Dose:  10 mg     losartan 50 MG Tabs  Commonly known as:  COZAAR   Take 1 Tab by mouth every day.  Dose:  50 mg     MAGNESIUM PO   Take 250 mg by mouth 1 time daily as needed (supplement).  Dose:  250 mg     megestrol 20 MG Tabs  Commonly known as:  MEGACE   Take 1 Tab by mouth every day.  Dose:  20 mg     metoprolol SR 25 MG Tb24  Commonly known as:  TOPROL XL   Take 1 Tab by mouth every day.  Dose:  25 mg     potassium chloride SA 20 MEQ Tbcr  Commonly known as:  Kdur   Take 1 Tab by mouth every day.  Dose:  20 mEq     terazosin 5 MG Caps  Commonly known as:  HYTRIN   Take 1 Cap by mouth every day.  Dose:  5 mg     Vitamin D (Cholecalciferol) 1000 units Caps   Take 2,000 Units by mouth every day at 6 PM.  Dose:  2000 Units        STOP taking these medications    clopidogrel 75 MG Tabs  Commonly known as:  PLAVIX            Allergies  No Known Allergies    DIET  Orders Placed This Encounter   Procedures   • Diet Order Cardiac     Standing Status:   Standing     Number of Occurrences:   1     Order Specific Question:   Diet:     Answer:   Cardiac  [6]     Order Specific Question:   Texture/Fiber modifications:     Answer:   Chopped Meat [5]       ACTIVITY  As tolerated.  Weight bearing as tolerated    CONSULTATIONS  GI    PROCEDURES  PROCEDURES:  Endoscopic retrograde cholangiopancreatography with:  1.  Bile duct stone and sludge extraction.  2.  Bile duct fully covered metal stent placement.  3.  Radiologic interpretation of static and dynamic fluoroscopic images by   myself, at no time was a radiologist present in the room.     INDICATION:  Bile duct obstruction, history of ampullary malignancy.     FINAL IMPRESSION:  1.  Biliary metal stent seen in place clearly occluded.  2.  Pancreatic duct neither injected nor cannulated.  3.  Common bile duct, occluded metal stent in place, otherwise normal course   and caliber.  Mild tissue ingrowth with a benign appearance.     THERAPEUTICS:  1.  Bile duct balloon stone/sludge/pus extraction.  2.  Bile duct fully covered metal stent placement 10x60 mm.    TA-KMIT-DTQMIHC STENT - TUBE   Final Result      Limited intraprocedural image from ERCP showing metallic biliary stent.      CT-ABDOMEN-PELVIS WITH   Final Result         1. There is a CBD stent. There is debris/sludge in the dilated CBD and proximal intrahepatic bile ducts. Obstruction of the stent and/or superimposed infection are possible.      2. Worsening pancreatic duct dilatation. 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 The differential includes pancreatic duct obstruction due to the stent.      3. Large right inguinal hernia containing the stool-filled cecum.      4. Significantly enlarged prostate.      5. Sigmoid diverticulosis.      CT-CTA CHEST PULMONARY ARTERY W/ RECONS   Final Result         1. No CT evidence of pulmonary embolism. Evaluation of the subsegmental branches of the bilateral lower lobes are limited due to motion artifact.      2. Patchy bibasilar opacities, left more than right,  atelectasis versus consolidation.      3. Pneumobilia and dilated biliary ducts. Correlate clinically.      DX-CHEST-PORTABLE (1 VIEW)   Final Result         1. No acute cardiopulmonary abnormalities are identified.            LABORATORY  Lab Results   Component Value Date    SODIUM 142 08/04/2018    POTASSIUM 3.5 (L) 08/04/2018    CHLORIDE 113 (H) 08/04/2018    CO2 22 08/04/2018    GLUCOSE 146 (H) 08/04/2018    BUN 20 08/04/2018    CREATININE 0.72 08/04/2018        Lab Results   Component Value Date    WBC 8.1 08/01/2018    HEMOGLOBIN 10.4 (L) 08/01/2018    HEMATOCRIT 31.6 (L) 08/01/2018    PLATELETCT 141 (L) 08/01/2018        Total time of the discharge process exceeds 34 minutes.

## 2018-08-06 ENCOUNTER — HOME CARE VISIT (OUTPATIENT)
Dept: HOME HEALTH SERVICES | Facility: HOME HEALTHCARE | Age: 83
End: 2018-08-06
Payer: MEDICARE

## 2018-08-06 ENCOUNTER — PATIENT OUTREACH (OUTPATIENT)
Dept: HEALTH INFORMATION MANAGEMENT | Facility: OTHER | Age: 83
End: 2018-08-06

## 2018-08-06 ENCOUNTER — APPOINTMENT (OUTPATIENT)
Dept: MEDICAL GROUP | Facility: MEDICAL CENTER | Age: 83
End: 2018-08-06
Payer: MEDICARE

## 2018-08-06 VITALS
TEMPERATURE: 99.7 F | HEART RATE: 66 BPM | BODY MASS INDEX: 22.5 KG/M2 | WEIGHT: 140 LBS | HEIGHT: 66 IN | DIASTOLIC BLOOD PRESSURE: 61 MMHG | SYSTOLIC BLOOD PRESSURE: 142 MMHG | RESPIRATION RATE: 18 BRPM | OXYGEN SATURATION: 98 %

## 2018-08-06 PROCEDURE — 665998 HH PPS REVENUE CREDIT

## 2018-08-06 PROCEDURE — 665999 HH PPS REVENUE DEBIT

## 2018-08-06 PROCEDURE — 665001 SOC-HOME HEALTH

## 2018-08-06 PROCEDURE — G0493 RN CARE EA 15 MIN HH/HOSPICE: HCPCS

## 2018-08-06 SDOH — ECONOMIC STABILITY: HOUSING INSECURITY: HOME SAFETY: PT HAS MULTIPLE THROW RUGS, AND ONE MEDIUM DOG THAT CREATES A POTENTIAL RISK AS A TRIP/SLIP HAZARD.

## 2018-08-06 SDOH — ECONOMIC STABILITY: HOUSING INSECURITY: UNSAFE COOKING RANGE AREA: 0

## 2018-08-06 SDOH — ECONOMIC STABILITY: HOUSING INSECURITY: UNSAFE APPLIANCES: 0

## 2018-08-06 ASSESSMENT — ACTIVITIES OF DAILY LIVING (ADL): HOME_HEALTH_OASIS: 01

## 2018-08-06 ASSESSMENT — ENCOUNTER SYMPTOMS
SHORTNESS OF BREATH: T
VOMITING: DENIES
NAUSEA: DENIES

## 2018-08-06 NOTE — PROGRESS NOTES
8/6/18 1:20 pm:  CM post discharge outreach call first attempt. Spouse answered telephone. Reports patient is currently eating lunch and requesting CM call back after 3:00pm.

## 2018-08-07 ENCOUNTER — TELEPHONE (OUTPATIENT)
Dept: HEALTH INFORMATION MANAGEMENT | Facility: OTHER | Age: 83
End: 2018-08-07

## 2018-08-07 ENCOUNTER — HOME CARE VISIT (OUTPATIENT)
Dept: HOME HEALTH SERVICES | Facility: HOME HEALTHCARE | Age: 83
End: 2018-08-07
Payer: MEDICARE

## 2018-08-07 VITALS
OXYGEN SATURATION: 97 % | SYSTOLIC BLOOD PRESSURE: 131 MMHG | RESPIRATION RATE: 18 BRPM | HEART RATE: 74 BPM | DIASTOLIC BLOOD PRESSURE: 58 MMHG | TEMPERATURE: 98.1 F

## 2018-08-07 VITALS
SYSTOLIC BLOOD PRESSURE: 98 MMHG | DIASTOLIC BLOOD PRESSURE: 50 MMHG | OXYGEN SATURATION: 97 % | TEMPERATURE: 99.5 F | HEART RATE: 65 BPM | RESPIRATION RATE: 18 BRPM

## 2018-08-07 PROCEDURE — 665998 HH PPS REVENUE CREDIT

## 2018-08-07 PROCEDURE — G0151 HHCP-SERV OF PT,EA 15 MIN: HCPCS

## 2018-08-07 PROCEDURE — 665999 HH PPS REVENUE DEBIT

## 2018-08-07 PROCEDURE — G0156 HHCP-SVS OF AIDE,EA 15 MIN: HCPCS

## 2018-08-07 NOTE — TELEPHONE ENCOUNTER
Received referral from [facility]. Medications reviewed against discharge summary. No clinically significant interactions noted.     Rain Blackwood, ElzbietaD

## 2018-08-08 ENCOUNTER — HOME CARE VISIT (OUTPATIENT)
Dept: HOME HEALTH SERVICES | Facility: HOME HEALTHCARE | Age: 83
End: 2018-08-08
Payer: MEDICARE

## 2018-08-08 VITALS
RESPIRATION RATE: 18 BRPM | TEMPERATURE: 99.8 F | HEART RATE: 91 BPM | OXYGEN SATURATION: 98 % | DIASTOLIC BLOOD PRESSURE: 58 MMHG | SYSTOLIC BLOOD PRESSURE: 103 MMHG

## 2018-08-08 PROCEDURE — 665999 HH PPS REVENUE DEBIT

## 2018-08-08 PROCEDURE — 665998 HH PPS REVENUE CREDIT

## 2018-08-08 PROCEDURE — G0152 HHCP-SERV OF OT,EA 15 MIN: HCPCS

## 2018-08-08 SDOH — ECONOMIC STABILITY: HOUSING INSECURITY: HOME SAFETY: NEATLY KEPT SINGLE STORY HOME W SMALL DOG

## 2018-08-08 ASSESSMENT — ACTIVITIES OF DAILY LIVING (ADL)
LAUNDRY_ASSISTANCE: 6
GROOMING_ASSISTANCE: 0
DRESSING_LB_ASSISTANCE: 0
OASIS_M1830: 05
TOILETING_EQUIPMENT_USED: RISER
BATHING_ASSISTANCE: 1
HOUSEKEEPING_ASSISTANCE: 6
ORAL_CARE_ASSISTANCE: 0
MEAL_PREP_ASSISTANCE: 6
EATING_ASSISTANCE: 0
TELEPHONE_ASSISTANCE: 6
TOILETING_ASSISTANCE: 0
DRESSING_UB_ASSISTANCE: 0
BATHING_ASSISTIVE_EQUIPMENT_USED: GRAB BAR
TRANSPORTATION_ASSISTANCE: 6
SHOPPING_ASSISTANCE: 6

## 2018-08-08 ASSESSMENT — ENCOUNTER SYMPTOMS
DIFFICULTY THINKING: 1
DEPRESSED MOOD: 1

## 2018-08-09 ENCOUNTER — HOME CARE VISIT (OUTPATIENT)
Dept: HOME HEALTH SERVICES | Facility: HOME HEALTHCARE | Age: 83
End: 2018-08-09
Payer: MEDICARE

## 2018-08-09 ENCOUNTER — OFFICE VISIT (OUTPATIENT)
Dept: MEDICAL GROUP | Facility: MEDICAL CENTER | Age: 83
End: 2018-08-09
Payer: MEDICARE

## 2018-08-09 VITALS
BODY MASS INDEX: 20.16 KG/M2 | TEMPERATURE: 98.9 F | OXYGEN SATURATION: 98 % | SYSTOLIC BLOOD PRESSURE: 130 MMHG | HEART RATE: 80 BPM | HEIGHT: 68 IN | WEIGHT: 133 LBS | RESPIRATION RATE: 14 BRPM | DIASTOLIC BLOOD PRESSURE: 58 MMHG

## 2018-08-09 VITALS
TEMPERATURE: 99.4 F | DIASTOLIC BLOOD PRESSURE: 57 MMHG | SYSTOLIC BLOOD PRESSURE: 105 MMHG | HEART RATE: 82 BPM | OXYGEN SATURATION: 95 % | RESPIRATION RATE: 18 BRPM

## 2018-08-09 DIAGNOSIS — K83.1 BILE DUCT OBSTRUCTION: ICD-10-CM

## 2018-08-09 DIAGNOSIS — Z85.09 HISTORY OF GALLBLADDER CANCER: ICD-10-CM

## 2018-08-09 DIAGNOSIS — R53.1 WEAKNESS GENERALIZED: ICD-10-CM

## 2018-08-09 DIAGNOSIS — Z09 HOSPITAL DISCHARGE FOLLOW-UP: ICD-10-CM

## 2018-08-09 PROCEDURE — 99495 TRANSJ CARE MGMT MOD F2F 14D: CPT | Performed by: FAMILY MEDICINE

## 2018-08-09 PROCEDURE — 665998 HH PPS REVENUE CREDIT

## 2018-08-09 PROCEDURE — 665999 HH PPS REVENUE DEBIT

## 2018-08-09 PROCEDURE — G0151 HHCP-SERV OF PT,EA 15 MIN: HCPCS

## 2018-08-09 ASSESSMENT — ACTIVITIES OF DAILY LIVING (ADL): TRANSPORTATION COMMENTS: REQUIRES ASSIST IN AND OUT OF HOME

## 2018-08-09 NOTE — PATIENT INSTRUCTIONS
If you need further assistance, or have any questions; concerns or lingering symptoms before seeing your Primary Care Provider or specialist.     Do not hesitate to contact us.     Please contact us at the Post-Hospital Follow Up Program at [phone] and ask for the Medical Assistant for Dr Mendoza.   Our offices hours are Monday-Friday 8 am-5 pm.

## 2018-08-09 NOTE — PROGRESS NOTES
Subjective:     Donato Burciaga is a 89 y.o. male who presents for Hospital Follow-up.  Chart and discharge summary reviewed.   Date of discharge 8/4/18.  48- hour post discharge RN call completed on 8/6/18 and documented in the medical record by Marcela Quintero RN:  POST DISCHARGE CALL:  Discharge Date:8/4/2018   Date of Outreach Call: 8/6/2018  3:32 PM  Now that you're home, how are you doing? Good  Comment:Pt reports home care nurse made visit today  Do you have questions about your medications? No    Did you fill your medications? Yes    Do you have a follow-up appointment scheduled?Yes  Comment:Post discharge clinic on 8/9/18    Discharging Department: [facility]    Number of Attempts: 2  Current or previous attempts completed within two business days of discharge? Yes  Provided education regarding treatment plan, medication, self-management, ADLs? Yes  Has patient completed Advance Directive? If yes, advise them to bring to appointment. Yes  Care Manager phone number provided? Yes  Is there anything else I can help you with? No        HPI: Recently hospitalized for increasing weakness.  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  Patient underwent ERCP and placement of an elongated stent in the common bile duct.  He was discharged on Cipro and Flagyl.    The patient's history significant for coronary artery disease, hypertension, normocytic anemia.    Since returning home, patient reports feeling good.     The patient has questions regarding hospitalization or discharge: His wife is his caregiver and her questions were answered.  The patient has weakness; has difficulty taking care of self at home.  Patient reports taking medications as instructed.      Allergies:   Patient has no known allergies.    Social History:  Social History   Substance Use Topics   • Smoking status: Former Smoker     Years: 0.00     Types: Cigarettes     Quit date:  "1/1/1960   • Smokeless tobacco: Never Used   • Alcohol use No        ROS:    Constitutional:  Denies fever, chills, night sweats  HENT: Denies head congestion, ear pain or drainage, decreased hearing, sore throat  Eyes: Denies visual changes, eye drainage or redness, eye pain  Neck: Denies pain, swollen glands, decreased range of motion  Lungs: Denies shortness of breath, wheezing, cough  Cardiovascular: Denies chest pain, orthopnea, lower extremity edema, palpitations  Abdominal: Denies abdominal pain, change in bowel or bladder habits, nausea or vomiting  Endocrine: Denies unexplained weight changes, heat or cold intolerance, hair loss, polyuria or polydipsia  Neurological: Denies dizziness, headache, confusion, focal weakness or numbness  Psychiatric: Denies depression, anxiety, insomnia       Objective:     Blood pressure 130/58, pulse 80, temperature 37.2 °C (98.9 °F), resp. rate 14, height 1.727 m (5' 8\"), weight 60.3 kg (133 lb), SpO2 98 %.     Physical Exam:    GEN:  Alert, oriented, in no distress  HEENT:  PERRLA, EOMI  NECK;  Supple without adenopathy   LUNGS:  Clear to auscultation without rales, rhonci, or wheezes.  CV:  Heart RRR without murmurs or S3 or S4  ABD:  Soft, nontender, nondistended  EXT:  Without cyanosis, clubbing, or edema.  NEURO:  Cranial nerves II through XII intact.  Motor function and sensation grossly intact.  He ambulates with a walker  SKIN: No rashes or suspicious lesions.  PSYCH:  Behavior is appropriate.      Assessment and Plan:     1. Hospital follow-up:   Hospitalization and results reviewed with patient. High risk conditions requiring teaching or care coordination were identified and addressed.The patient demonstrate understanding of admission and underlying conditions. The patient understands discharge instructions and when to seek medical attention. Medications reviewed including instructions regarding high risk medications, dosing and side effects.    The patient is able " to safely adhere to ADL/IADL, treatment and medication regimen, self-manage of high-risk conditions? No , His wife, who is 4 years his senior, is his caregiver.  The patient requires physical therapy/home health/DME referral?  Home health started.  The patient requires referral to care coordination/behavioral health/social work?  No   Patient requires referral for pharmacy consult? No   Advance directive/POLST on file?  Yes, it turns out this was buried in his medical records sent in 2016 from [facility].  We copied the advanced directive and really scanned it so it can be easily found.  Required counseling on advance directive?  No      2. Bile duct obstruction  -Resolved with new stent    3. Weakness generalized  -This is somewhat improving although he is weak at his baseline.    4. History of ampullary carcinoma  -He has required repeated stent replacements.        Medication Reconciliation  Medication list at end of encounter:   Current Outpatient Prescriptions   Medication Sig Dispense Refill   • ciprofloxacin (CIPRO) 500 MG Tab Take 1 Tab by mouth 2 times a day for 5 days. 10 Tab 0   • metroNIDAZOLE (FLAGYL) 500 MG Tab Take 1 Tab by mouth every 8 hours for 5 days. 15 Tab 0   • metoprolol SR (TOPROL XL) 25 MG TABLET SR 24 HR Take 1 Tab by mouth every day. 90 Tab 3   • losartan (COZAAR) 50 MG Tab Take 1 Tab by mouth every day. 90 Tab 3   • atorvastatin (LIPITOR) 10 MG Tab Take 1 Tab by mouth every evening. 90 Tab 3   • potassium chloride SA (KDUR) 20 MEQ Tab CR Take 1 Tab by mouth every day. 90 Tab 3   • megestrol (MEGACE) 20 MG Tab Take 1 Tab by mouth every day. 90 Tab 3   • terazosin (HYTRIN) 5 MG Cap Take 1 Cap by mouth every day. 90 Cap 3   • aspirin EC 81 MG EC tablet Take 1 Tab by mouth every day. (Patient taking differently: Take 81 mg by mouth every evening.) 30 Tab 3   • Vitamin D, Cholecalciferol, 1000 UNITS Cap Take 2,000 Units by mouth every day at 6 PM.       No current facility-administered  medications for this visit.        Primary care follow-up:  New health conditions identified during hospitalization? No   Changes to medications during hospitalization or today? No     Recommended followup:  with Fabiano Burk M.D.   Future Appointments       Provider Department [place]    8/10/2018 JAI Saldaña R.N. [facility]     8/10/2018 TBD Solemarc Franz, MSW [facility]     8/14/2018 10:00 AM Anabelle Goldberg C.N.A. [facility]     8/14/2018 TBERIC Saldaña R.N. [facility]     8/14/2018 1:30 PM Maria Victoria Barboza, MS,OTR/L [facility]     8/14/2018 2:30 PM Leda Chamberlain, PT [facility]     8/15/2018 TBD Leda Chamberlain, PT [facility]     8/17/2018 9:00 AM Anabelle Goldberg C.N.A. [facility]     8/17/2018 TBD Jordan Saldaña R.N. [facility]     8/20/2018 TBD Leda Chamberlain, PT [facility]     8/21/2018 TBD CUCA BledsoeNDarleneADarlene [facility]     8/21/2018 TBD Maria Victoria Barboza, MS,OTR/L [facility]     8/22/2018 TBD Jordan Saldaña R.N. [facility]     8/22/2018 10:00 AM Leda Chamberlain, PT [facility]     8/24/2018 TBD CUCA BledsoeN.ADarlene [facility]     8/27/2018 TBD CUCA BledsoeNSHAQUILLE [facility]     8/28/2018 1:20 PM Fabiano Burk M.D. [facility] [address] [place] [address]    8/29/2018 JAI Saldaña R.N. [facility]     8/31/2018 TBD UCCA BledsoeNDarleneADarlene [facility]     9/4/2018 TBD CUCA BledsoeNSHAQUILLE [facility]     9/5/2018 TBD Jordan Saldaña R.N. [facility]     9/6/2018 1:45 PM Tony Rodas M.D. [facility]     9/7/2018 TBD Anabelle Goldberg C.N.A. [facility]     9/11/2018 TBD Anabelle Goldberg C.N.A. [facility]     9/12/2018 TBD Jordan Saldaña R.N. [facility]     9/14/2018 TBD CUCA BledsoeNDarleneADarlene [facility]     9/18/2018 TBD CUCA BledsoeNSHAQUILLE [facility]     9/19/2018 D Jordan Saldaña R.N. [facility]     9/21/2018 D Anabelle Goldberg C.N.A. [facility]     9/25/2018 D Anabelle Goldberg C.N.A. [facility]     9/26/2018 D Jordan Saldaña R.N. [facility]     9/28/2018 D Anabelle Goldberg C.N.A. [facility]     10/1/2018 D  [facility]     10/4/2018 [facility]     10/4/2018 [facility] Jordan Saldaña R.N. [facility]     2/26/2019 1:20 PM VASCULAR NURSE PRACTITIONER [facility]            Patient Instruction  Patient provided with educational material on discharge diagnosis and management of symptoms/red flags. Patient instructed to keep follow-up appointments and to bring written questions and and actual medications to each office visit. Patient instructed to call PCP/specialist with any problems/questions/concerns. Patient verbalizes understanding and has no further questions at this time.    Face-to-face transitional care management services with moderate medical decision complexity were provided.

## 2018-08-10 ENCOUNTER — HOME CARE VISIT (OUTPATIENT)
Dept: HOME HEALTH SERVICES | Facility: HOME HEALTHCARE | Age: 83
End: 2018-08-10
Payer: MEDICARE

## 2018-08-10 PROCEDURE — 665999 HH PPS REVENUE DEBIT

## 2018-08-10 PROCEDURE — 665998 HH PPS REVENUE CREDIT

## 2018-08-11 PROCEDURE — 665999 HH PPS REVENUE DEBIT

## 2018-08-11 PROCEDURE — 665998 HH PPS REVENUE CREDIT

## 2018-08-12 PROCEDURE — 665998 HH PPS REVENUE CREDIT

## 2018-08-12 PROCEDURE — 665999 HH PPS REVENUE DEBIT

## 2018-08-13 ENCOUNTER — HOME CARE VISIT (OUTPATIENT)
Dept: HOME HEALTH SERVICES | Facility: HOME HEALTHCARE | Age: 83
End: 2018-08-13
Payer: MEDICARE

## 2018-08-13 PROCEDURE — 665998 HH PPS REVENUE CREDIT

## 2018-08-13 PROCEDURE — 665999 HH PPS REVENUE DEBIT

## 2018-08-14 ENCOUNTER — HOME CARE VISIT (OUTPATIENT)
Dept: HOME HEALTH SERVICES | Facility: HOME HEALTHCARE | Age: 83
End: 2018-08-14
Payer: MEDICARE

## 2018-08-14 VITALS
TEMPERATURE: 98.5 F | SYSTOLIC BLOOD PRESSURE: 107 MMHG | OXYGEN SATURATION: 98 % | RESPIRATION RATE: 20 BRPM | DIASTOLIC BLOOD PRESSURE: 62 MMHG | HEART RATE: 89 BPM

## 2018-08-14 VITALS
OXYGEN SATURATION: 98 % | BODY MASS INDEX: 20.13 KG/M2 | TEMPERATURE: 98.5 F | HEART RATE: 89 BPM | SYSTOLIC BLOOD PRESSURE: 107 MMHG | WEIGHT: 132.4 LBS | DIASTOLIC BLOOD PRESSURE: 62 MMHG | RESPIRATION RATE: 18 BRPM

## 2018-08-14 PROCEDURE — G0156 HHCP-SVS OF AIDE,EA 15 MIN: HCPCS

## 2018-08-14 PROCEDURE — G0152 HHCP-SERV OF OT,EA 15 MIN: HCPCS

## 2018-08-14 PROCEDURE — 665998 HH PPS REVENUE CREDIT

## 2018-08-14 PROCEDURE — G0299 HHS/HOSPICE OF RN EA 15 MIN: HCPCS

## 2018-08-14 PROCEDURE — 665999 HH PPS REVENUE DEBIT

## 2018-08-14 PROCEDURE — G0151 HHCP-SERV OF PT,EA 15 MIN: HCPCS

## 2018-08-14 ASSESSMENT — ENCOUNTER SYMPTOMS
DEPRESSED MOOD: 1
POOR JUDGMENT: 1
DIFFICULTY THINKING: 1

## 2018-08-15 ENCOUNTER — HOME CARE VISIT (OUTPATIENT)
Dept: HOME HEALTH SERVICES | Facility: HOME HEALTHCARE | Age: 83
End: 2018-08-15
Payer: MEDICARE

## 2018-08-15 VITALS
OXYGEN SATURATION: 98 % | RESPIRATION RATE: 20 BRPM | HEART RATE: 89 BPM | TEMPERATURE: 98.5 F | DIASTOLIC BLOOD PRESSURE: 62 MMHG | SYSTOLIC BLOOD PRESSURE: 107 MMHG

## 2018-08-15 VITALS
SYSTOLIC BLOOD PRESSURE: 107 MMHG | HEART RATE: 89 BPM | DIASTOLIC BLOOD PRESSURE: 62 MMHG | RESPIRATION RATE: 20 BRPM | OXYGEN SATURATION: 98 % | TEMPERATURE: 98.5 F

## 2018-08-15 PROCEDURE — 665999 HH PPS REVENUE DEBIT

## 2018-08-15 PROCEDURE — 665998 HH PPS REVENUE CREDIT

## 2018-08-15 PROCEDURE — G0155 HHCP-SVS OF CSW,EA 15 MIN: HCPCS

## 2018-08-15 PROCEDURE — G0180 MD CERTIFICATION HHA PATIENT: HCPCS | Performed by: INTERNAL MEDICINE

## 2018-08-15 ASSESSMENT — ENCOUNTER SYMPTOMS
NAUSEA: DENIES
POOR JUDGMENT: 1

## 2018-08-16 ENCOUNTER — HOME CARE VISIT (OUTPATIENT)
Dept: HOME HEALTH SERVICES | Facility: HOME HEALTHCARE | Age: 83
End: 2018-08-16
Payer: MEDICARE

## 2018-08-16 VITALS
RESPIRATION RATE: 18 BRPM | OXYGEN SATURATION: 98 % | HEART RATE: 87 BPM | DIASTOLIC BLOOD PRESSURE: 50 MMHG | TEMPERATURE: 98.6 F | SYSTOLIC BLOOD PRESSURE: 102 MMHG

## 2018-08-16 PROCEDURE — 665999 HH PPS REVENUE DEBIT

## 2018-08-16 PROCEDURE — G0151 HHCP-SERV OF PT,EA 15 MIN: HCPCS

## 2018-08-16 PROCEDURE — G0299 HHS/HOSPICE OF RN EA 15 MIN: HCPCS

## 2018-08-16 PROCEDURE — 665998 HH PPS REVENUE CREDIT

## 2018-08-17 ENCOUNTER — HOME CARE VISIT (OUTPATIENT)
Dept: HOME HEALTH SERVICES | Facility: HOME HEALTHCARE | Age: 83
End: 2018-08-17
Payer: MEDICARE

## 2018-08-17 VITALS
OXYGEN SATURATION: 99 % | SYSTOLIC BLOOD PRESSURE: 103 MMHG | HEART RATE: 67 BPM | RESPIRATION RATE: 20 BRPM | DIASTOLIC BLOOD PRESSURE: 53 MMHG | TEMPERATURE: 98.6 F

## 2018-08-17 VITALS
HEART RATE: 87 BPM | SYSTOLIC BLOOD PRESSURE: 102 MMHG | RESPIRATION RATE: 18 BRPM | OXYGEN SATURATION: 98 % | TEMPERATURE: 98.6 F | DIASTOLIC BLOOD PRESSURE: 50 MMHG

## 2018-08-17 PROCEDURE — 665999 HH PPS REVENUE DEBIT

## 2018-08-17 PROCEDURE — 665998 HH PPS REVENUE CREDIT

## 2018-08-17 PROCEDURE — G0156 HHCP-SVS OF AIDE,EA 15 MIN: HCPCS

## 2018-08-17 ASSESSMENT — SOCIAL DETERMINANTS OF HEALTH (SDOH): IS CONCERNED ABOUT INCOME: N

## 2018-08-18 PROCEDURE — 665998 HH PPS REVENUE CREDIT

## 2018-08-18 PROCEDURE — 665999 HH PPS REVENUE DEBIT

## 2018-08-19 PROCEDURE — 665999 HH PPS REVENUE DEBIT

## 2018-08-19 PROCEDURE — 665998 HH PPS REVENUE CREDIT

## 2018-08-20 ENCOUNTER — HOME CARE VISIT (OUTPATIENT)
Dept: HOME HEALTH SERVICES | Facility: HOME HEALTHCARE | Age: 83
End: 2018-08-20
Payer: MEDICARE

## 2018-08-20 PROCEDURE — 665998 HH PPS REVENUE CREDIT

## 2018-08-20 PROCEDURE — 665999 HH PPS REVENUE DEBIT

## 2018-08-20 PROCEDURE — G0152 HHCP-SERV OF OT,EA 15 MIN: HCPCS

## 2018-08-20 ASSESSMENT — ENCOUNTER SYMPTOMS
DEPRESSED MOOD: 1
NAUSEA: DENIES
POOR JUDGMENT: 1

## 2018-08-21 ENCOUNTER — HOME CARE VISIT (OUTPATIENT)
Dept: HOME HEALTH SERVICES | Facility: HOME HEALTHCARE | Age: 83
End: 2018-08-21
Payer: MEDICARE

## 2018-08-21 VITALS
DIASTOLIC BLOOD PRESSURE: 58 MMHG | TEMPERATURE: 99.6 F | HEART RATE: 76 BPM | OXYGEN SATURATION: 98 % | RESPIRATION RATE: 18 BRPM | SYSTOLIC BLOOD PRESSURE: 103 MMHG

## 2018-08-21 VITALS
RESPIRATION RATE: 18 BRPM | DIASTOLIC BLOOD PRESSURE: 51 MMHG | TEMPERATURE: 98.6 F | OXYGEN SATURATION: 98 % | SYSTOLIC BLOOD PRESSURE: 91 MMHG | HEART RATE: 76 BPM

## 2018-08-21 PROCEDURE — 665998 HH PPS REVENUE CREDIT

## 2018-08-21 PROCEDURE — 665999 HH PPS REVENUE DEBIT

## 2018-08-21 PROCEDURE — G0151 HHCP-SERV OF PT,EA 15 MIN: HCPCS

## 2018-08-21 SDOH — ECONOMIC STABILITY: HOUSING INSECURITY: HOME SAFETY: NEAT AND ORGANIZED

## 2018-08-21 ASSESSMENT — ACTIVITIES OF DAILY LIVING (ADL)
TOILETING_ASSISTANCE: 0
LAUNDRY_ASSISTANCE: 5
EATING_ASSISTANCE: 0
TELEPHONE_ASSISTANCE: 6
BATHING_ASSISTANCE: 1
DRESSING_UB_ASSISTANCE: 0
MEAL_PREP_ASSISTANCE: 6
GROOMING_ASSISTANCE: 1
DRESSING_LB_ASSISTANCE: 0
HOUSEKEEPING_ASSISTANCE: 5
BATHING_ASSISTIVE_EQUIPMENT_USED: GRAB BAR
TRANSPORTATION_ASSISTANCE: 6
IADLS_COMMENTS: -->
SHOPPING_ASSISTANCE: 5

## 2018-08-21 ASSESSMENT — ENCOUNTER SYMPTOMS
DIFFICULTY THINKING: 1
DEPRESSED MOOD: 1
POOR JUDGMENT: 1

## 2018-08-22 ENCOUNTER — HOME CARE VISIT (OUTPATIENT)
Dept: HOME HEALTH SERVICES | Facility: HOME HEALTHCARE | Age: 83
End: 2018-08-22
Payer: MEDICARE

## 2018-08-22 PROCEDURE — G0299 HHS/HOSPICE OF RN EA 15 MIN: HCPCS

## 2018-08-22 PROCEDURE — 665998 HH PPS REVENUE CREDIT

## 2018-08-22 PROCEDURE — 665999 HH PPS REVENUE DEBIT

## 2018-08-23 ENCOUNTER — HOME CARE VISIT (OUTPATIENT)
Dept: HOME HEALTH SERVICES | Facility: HOME HEALTHCARE | Age: 83
End: 2018-08-23
Payer: MEDICARE

## 2018-08-23 VITALS
SYSTOLIC BLOOD PRESSURE: 110 MMHG | RESPIRATION RATE: 18 BRPM | TEMPERATURE: 98.4 F | HEART RATE: 68 BPM | DIASTOLIC BLOOD PRESSURE: 60 MMHG | OXYGEN SATURATION: 99 %

## 2018-08-23 PROCEDURE — 665998 HH PPS REVENUE CREDIT

## 2018-08-23 PROCEDURE — G0151 HHCP-SERV OF PT,EA 15 MIN: HCPCS

## 2018-08-23 PROCEDURE — 665999 HH PPS REVENUE DEBIT

## 2018-08-23 ASSESSMENT — ACTIVITIES OF DAILY LIVING (ADL): TRANSPORTATION COMMENTS: REQUIRES ASSIST IN AND OUT OF HOME

## 2018-08-24 ENCOUNTER — HOME CARE VISIT (OUTPATIENT)
Dept: HOME HEALTH SERVICES | Facility: HOME HEALTHCARE | Age: 83
End: 2018-08-24
Payer: MEDICARE

## 2018-08-24 VITALS
SYSTOLIC BLOOD PRESSURE: 112 MMHG | DIASTOLIC BLOOD PRESSURE: 61 MMHG | RESPIRATION RATE: 20 BRPM | HEART RATE: 84 BPM | OXYGEN SATURATION: 98 % | TEMPERATURE: 98.8 F

## 2018-08-24 PROCEDURE — 665998 HH PPS REVENUE CREDIT

## 2018-08-24 PROCEDURE — G0156 HHCP-SVS OF AIDE,EA 15 MIN: HCPCS

## 2018-08-24 PROCEDURE — 665999 HH PPS REVENUE DEBIT

## 2018-08-25 PROCEDURE — 665999 HH PPS REVENUE DEBIT

## 2018-08-25 PROCEDURE — 665998 HH PPS REVENUE CREDIT

## 2018-08-26 PROCEDURE — 665999 HH PPS REVENUE DEBIT

## 2018-08-26 PROCEDURE — 665998 HH PPS REVENUE CREDIT

## 2018-08-27 VITALS
TEMPERATURE: 97.9 F | HEART RATE: 64 BPM | DIASTOLIC BLOOD PRESSURE: 62 MMHG | RESPIRATION RATE: 18 BRPM | SYSTOLIC BLOOD PRESSURE: 128 MMHG | OXYGEN SATURATION: 98 %

## 2018-08-27 PROCEDURE — 665999 HH PPS REVENUE DEBIT

## 2018-08-27 PROCEDURE — 665998 HH PPS REVENUE CREDIT

## 2018-08-27 ASSESSMENT — ENCOUNTER SYMPTOMS
DEPRESSED MOOD: 1
POOR JUDGMENT: 1
NAUSEA: DENIES
RESPIRATORY SYMPTOMS COMMENTS: DENIES SOB, RESP EVEN AND UNLABORED

## 2018-08-28 ENCOUNTER — HOSPITAL ENCOUNTER (OUTPATIENT)
Dept: LAB | Facility: MEDICAL CENTER | Age: 83
End: 2018-08-28
Attending: INTERNAL MEDICINE
Payer: MEDICARE

## 2018-08-28 ENCOUNTER — OFFICE VISIT (OUTPATIENT)
Dept: MEDICAL GROUP | Facility: MEDICAL CENTER | Age: 83
End: 2018-08-28
Payer: MEDICARE

## 2018-08-28 VITALS
BODY MASS INDEX: 19.1 KG/M2 | SYSTOLIC BLOOD PRESSURE: 120 MMHG | RESPIRATION RATE: 16 BRPM | TEMPERATURE: 98.3 F | OXYGEN SATURATION: 97 % | HEART RATE: 85 BPM | HEIGHT: 68 IN | DIASTOLIC BLOOD PRESSURE: 60 MMHG | WEIGHT: 126 LBS

## 2018-08-28 DIAGNOSIS — K83.1 BILE OBSTRUCTION: ICD-10-CM

## 2018-08-28 DIAGNOSIS — R79.89 LFTS ABNORMAL: ICD-10-CM

## 2018-08-28 DIAGNOSIS — E87.6 HYPOKALEMIA: ICD-10-CM

## 2018-08-28 PROBLEM — R53.1 WEAKNESS: Status: RESOLVED | Noted: 2018-04-17 | Resolved: 2018-08-28

## 2018-08-28 LAB
ALBUMIN SERPL BCP-MCNC: 3.5 G/DL (ref 3.2–4.9)
ALBUMIN/GLOB SERPL: 1.5 G/DL
ALP SERPL-CCNC: 141 U/L (ref 30–99)
ALT SERPL-CCNC: 35 U/L (ref 2–50)
ANION GAP SERPL CALC-SCNC: 10 MMOL/L (ref 0–11.9)
AST SERPL-CCNC: 26 U/L (ref 12–45)
BILIRUB SERPL-MCNC: 0.6 MG/DL (ref 0.1–1.5)
BUN SERPL-MCNC: 21 MG/DL (ref 8–22)
CALCIUM SERPL-MCNC: 8.5 MG/DL (ref 8.5–10.5)
CHLORIDE SERPL-SCNC: 107 MMOL/L (ref 96–112)
CO2 SERPL-SCNC: 23 MMOL/L (ref 20–33)
CREAT SERPL-MCNC: 0.8 MG/DL (ref 0.5–1.4)
GLOBULIN SER CALC-MCNC: 2.4 G/DL (ref 1.9–3.5)
GLUCOSE SERPL-MCNC: 89 MG/DL (ref 65–99)
POTASSIUM SERPL-SCNC: 3.9 MMOL/L (ref 3.6–5.5)
PROT SERPL-MCNC: 5.9 G/DL (ref 6–8.2)
SODIUM SERPL-SCNC: 140 MMOL/L (ref 135–145)

## 2018-08-28 PROCEDURE — 665998 HH PPS REVENUE CREDIT

## 2018-08-28 PROCEDURE — 36415 COLL VENOUS BLD VENIPUNCTURE: CPT

## 2018-08-28 PROCEDURE — 665999 HH PPS REVENUE DEBIT

## 2018-08-28 PROCEDURE — 80053 COMPREHEN METABOLIC PANEL: CPT

## 2018-08-28 PROCEDURE — 99214 OFFICE O/P EST MOD 30 MIN: CPT | Performed by: INTERNAL MEDICINE

## 2018-08-28 NOTE — PROGRESS NOTES
CC: Follow-up bile duct obstruction.    HPI:   Donato presents today with the following.      Presents after being hospitalized approximately 1 month ago with a obstruction.  He did receive a stent.  He denies any current nausea vomiting his weight is down however another 7 pounds since his last visit.  He does stay on appetite stimulants and is taking protein supplements in the form of shakes.  He does get progressively weaker and again is denying any acute complaints today.  Wife is having a difficult time caring for him at home as he is somewhat stubborn.  In office today does not convey any information other than he is fine.      Patient Active Problem List    Diagnosis Date Noted   • Near syncope 02/20/2018     Priority: High   • Hypokalemia 04/18/2018     Priority: Medium   • Coronary artery disease involving native heart without angina pectoris 03/07/2018     Priority: Medium   • LFTs abnormal 12/06/2017     Priority: Medium   • Essential hypertension 01/26/2015     Priority: Medium   • History of gallbladder cancer 11/09/2017     Priority: Low   • Anemia 02/09/2016     Priority: Low   • Dyslipidemia 01/26/2015     Priority: Low   • Normocytic anemia 07/31/2018   • Appetite loss 03/07/2018   • History of non-ST elevation myocardial infarction (NSTEMI) 02/21/2018   • Benign essential tremor 11/03/2016   • IGT (impaired glucose tolerance) 11/30/2015   • Benign non-nodular prostatic hyperplasia with lower urinary tract symptoms 08/03/2015       Current Outpatient Prescriptions   Medication Sig Dispense Refill   • metoprolol SR (TOPROL XL) 25 MG TABLET SR 24 HR Take 1 Tab by mouth every day. 90 Tab 3   • losartan (COZAAR) 50 MG Tab Take 1 Tab by mouth every day. 90 Tab 3   • atorvastatin (LIPITOR) 10 MG Tab Take 1 Tab by mouth every evening. 90 Tab 3   • potassium chloride SA (KDUR) 20 MEQ Tab CR Take 1 Tab by mouth every day. 90 Tab 3   • megestrol (MEGACE) 20 MG Tab Take 1 Tab by mouth every day. 90 Tab 3   •  "terazosin (HYTRIN) 5 MG Cap Take 1 Cap by mouth every day. 90 Cap 3   • aspirin EC 81 MG EC tablet Take 1 Tab by mouth every day. (Patient taking differently: Take 81 mg by mouth every evening.) 30 Tab 3   • Vitamin D, Cholecalciferol, 1000 UNITS Cap Take 2,000 Units by mouth every day at 6 PM.       No current facility-administered medications for this visit.          Allergies as of 08/28/2018   • (No Known Allergies)        ROS: Denies Chest pain, SOB, LE edema.    /60   Pulse 85   Temp 36.8 °C (98.3 °F)   Resp 16   Ht 1.727 m (5' 8\")   Wt 57.2 kg (126 lb)   SpO2 97%   BMI 19.16 kg/m²     Physical Exam:  Gen:         Alert and oriented, No apparent distress.  Neck:        No Lymphadenopathy or Bruits.  Lungs:     Clear to auscultation bilaterally  CV:          Regular rate and rhythm. No murmurs, rubs or gallops.   ABD:      Soft non tender, non distended. Normal active bowel sounds.  No  Hepatosplenomegaly, No pulsatile masses.     Ext:          No clubbing, cyanosis, edema.      Assessment and Plan.   89 y.o. male with the following issues.    1. LFTs abnormal/ Hypokalemia/Bile obstruction  Discussion today revolving around need for nutrition as well as strengthening.  Have recommended physical therapy he declines.  He does not have follow-up scheduled with gastroenterology have placed referral today given them the number is they already have an established patient relationship at that office.  We will see back after seeing GI.  - REFERRAL TO GASTROENTEROLOGY  - COMP METABOLIC PANEL; Future  ROENTEROLOGY      "

## 2018-08-29 ENCOUNTER — HOME CARE VISIT (OUTPATIENT)
Dept: HOME HEALTH SERVICES | Facility: HOME HEALTHCARE | Age: 83
End: 2018-08-29
Payer: MEDICARE

## 2018-08-29 VITALS
RESPIRATION RATE: 20 BRPM | OXYGEN SATURATION: 98 % | DIASTOLIC BLOOD PRESSURE: 64 MMHG | SYSTOLIC BLOOD PRESSURE: 111 MMHG | HEART RATE: 84 BPM | TEMPERATURE: 99.3 F

## 2018-08-29 VITALS
HEART RATE: 80 BPM | SYSTOLIC BLOOD PRESSURE: 110 MMHG | OXYGEN SATURATION: 98 % | RESPIRATION RATE: 16 BRPM | DIASTOLIC BLOOD PRESSURE: 60 MMHG | TEMPERATURE: 98.4 F

## 2018-08-29 PROCEDURE — 665999 HH PPS REVENUE DEBIT

## 2018-08-29 PROCEDURE — G0300 HHS/HOSPICE OF LPN EA 15 MIN: HCPCS

## 2018-08-29 PROCEDURE — 665998 HH PPS REVENUE CREDIT

## 2018-08-29 PROCEDURE — G0156 HHCP-SVS OF AIDE,EA 15 MIN: HCPCS

## 2018-08-29 ASSESSMENT — ENCOUNTER SYMPTOMS
NAUSEA: DENIES
VOMITING: DENIES

## 2018-08-30 PROCEDURE — 665999 HH PPS REVENUE DEBIT

## 2018-08-30 PROCEDURE — 665998 HH PPS REVENUE CREDIT

## 2018-08-31 ENCOUNTER — HOME CARE VISIT (OUTPATIENT)
Dept: HOME HEALTH SERVICES | Facility: HOME HEALTHCARE | Age: 83
End: 2018-08-31
Payer: MEDICARE

## 2018-08-31 VITALS
TEMPERATURE: 98.8 F | OXYGEN SATURATION: 98 % | SYSTOLIC BLOOD PRESSURE: 122 MMHG | HEART RATE: 77 BPM | DIASTOLIC BLOOD PRESSURE: 62 MMHG | RESPIRATION RATE: 20 BRPM

## 2018-08-31 PROCEDURE — 665999 HH PPS REVENUE DEBIT

## 2018-08-31 PROCEDURE — G0156 HHCP-SVS OF AIDE,EA 15 MIN: HCPCS

## 2018-08-31 PROCEDURE — 665998 HH PPS REVENUE CREDIT

## 2018-09-01 PROCEDURE — 665998 HH PPS REVENUE CREDIT

## 2018-09-01 PROCEDURE — 665999 HH PPS REVENUE DEBIT

## 2018-09-02 PROCEDURE — 665999 HH PPS REVENUE DEBIT

## 2018-09-02 PROCEDURE — 665998 HH PPS REVENUE CREDIT

## 2018-09-03 PROCEDURE — 665998 HH PPS REVENUE CREDIT

## 2018-09-03 PROCEDURE — 665999 HH PPS REVENUE DEBIT

## 2018-09-04 ENCOUNTER — HOME CARE VISIT (OUTPATIENT)
Dept: HOME HEALTH SERVICES | Facility: HOME HEALTHCARE | Age: 83
End: 2018-09-04
Payer: MEDICARE

## 2018-09-04 ENCOUNTER — PATIENT OUTREACH (OUTPATIENT)
Dept: HEALTH INFORMATION MANAGEMENT | Facility: OTHER | Age: 83
End: 2018-09-04

## 2018-09-04 VITALS
SYSTOLIC BLOOD PRESSURE: 117 MMHG | HEART RATE: 70 BPM | TEMPERATURE: 99.8 F | RESPIRATION RATE: 20 BRPM | OXYGEN SATURATION: 98 % | DIASTOLIC BLOOD PRESSURE: 55 MMHG

## 2018-09-04 PROCEDURE — G0156 HHCP-SVS OF AIDE,EA 15 MIN: HCPCS

## 2018-09-04 PROCEDURE — 665998 HH PPS REVENUE CREDIT

## 2018-09-04 PROCEDURE — 665999 HH PPS REVENUE DEBIT

## 2018-09-05 ENCOUNTER — HOME CARE VISIT (OUTPATIENT)
Dept: HOME HEALTH SERVICES | Facility: HOME HEALTHCARE | Age: 83
End: 2018-09-05
Payer: MEDICARE

## 2018-09-05 PROCEDURE — 665998 HH PPS REVENUE CREDIT

## 2018-09-05 PROCEDURE — G0495 RN CARE TRAIN/EDU IN HH: HCPCS

## 2018-09-05 PROCEDURE — 665999 HH PPS REVENUE DEBIT

## 2018-09-06 ENCOUNTER — OFFICE VISIT (OUTPATIENT)
Dept: CARDIOLOGY | Facility: MEDICAL CENTER | Age: 83
End: 2018-09-06
Payer: MEDICARE

## 2018-09-06 VITALS
HEART RATE: 70 BPM | SYSTOLIC BLOOD PRESSURE: 110 MMHG | TEMPERATURE: 98.3 F | DIASTOLIC BLOOD PRESSURE: 68 MMHG | RESPIRATION RATE: 18 BRPM | OXYGEN SATURATION: 99 %

## 2018-09-06 VITALS
HEART RATE: 76 BPM | OXYGEN SATURATION: 97 % | WEIGHT: 125 LBS | SYSTOLIC BLOOD PRESSURE: 110 MMHG | HEIGHT: 68 IN | DIASTOLIC BLOOD PRESSURE: 60 MMHG | BODY MASS INDEX: 18.94 KG/M2 | RESPIRATION RATE: 14 BRPM

## 2018-09-06 DIAGNOSIS — R54 FRAILTY: ICD-10-CM

## 2018-09-06 DIAGNOSIS — I10 ESSENTIAL HYPERTENSION: ICD-10-CM

## 2018-09-06 DIAGNOSIS — R62.7 FAILURE TO THRIVE IN ADULT: ICD-10-CM

## 2018-09-06 DIAGNOSIS — I25.10 CORONARY ARTERY DISEASE INVOLVING NATIVE CORONARY ARTERY OF NATIVE HEART WITHOUT ANGINA PECTORIS: ICD-10-CM

## 2018-09-06 PROCEDURE — 665999 HH PPS REVENUE DEBIT

## 2018-09-06 PROCEDURE — 665998 HH PPS REVENUE CREDIT

## 2018-09-06 PROCEDURE — 99213 OFFICE O/P EST LOW 20 MIN: CPT | Performed by: INTERNAL MEDICINE

## 2018-09-06 ASSESSMENT — ENCOUNTER SYMPTOMS: RESPIRATORY SYMPTOMS COMMENTS: DENEIS BREATHING PROBLEM

## 2018-09-06 NOTE — PROGRESS NOTES
"Chief Complaint   Patient presents with   • Coronary Artery Disease       Subjective:   Donato Burciaga is an 89 y.o. male who presents today for follow-up of coronary artery disease.  Has a history of hyperlipidemia, hypertension and a non-ST elevation myocardial infarction with a LAD culprit lesion that received PTCA without the ability to place a stent due to heavy calcification.      Since last visit he has continued unabated weight loss despite dietary interventions by his 94-year-old wife who continues to cook for him, he just indicates his appetite is poor.  He has no pain and no exertional chest pain.  He mostly walks with a walker and usually is confined to a chair due to frailty and weakness.    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   • Cataract    • Dental disorder     dentures- full set   • High cholesterol    • History of chemotherapy last dose 9/2016   • History of radiation therapy last done on 09/28/2016   • Hyperlipidemia, mixed 1990   • Hypertension    • MI (myocardial infarction) (HCC)    • Snoring      Past Surgical History:   Procedure Laterality Date   • ERCP IN OR N/A 8/3/2018    Procedure: ERCP IN OR;  Surgeon: Amanuel Escamilla M.D.;  [facility];  Service: Gastroenterology   • ERCP IN OR  4/19/2018    Procedure: ERCP IN OR W/METAL STENT PLACEMENT;  Surgeon: Everett Blackwell M.D.;  Location: [facility];  Service: Gastroenterology   • CARDIAC CATH  02/21/2018    PTCA to 98% LAD, dilated to 40%, no stent.   • ERCP W/REM FB AND/OR STENT CHG N/A 12/5/2016    Procedure: ERCP W/REM FB AND/OR STENT CHG - PYLORIC STENT REMOVAL SWAP METAL STENT PLACEMENT;  Surgeon: Sunny Grove M.D.;  Location: [facility];  Service:    • GASTROSCOPY-ENDO N/A 7/11/2016    Procedure: GASTROSCOPY-ENDO;  Surgeon: Sunny Grove M.D.;  Location: " [facility];  Service:    • EGD W/ENDOSCOPIC ULTRASOUND N/A 7/11/2016    Procedure: EGD W/ENDOSCOPIC ULTRASOUND - UPPER LINEAR;  Surgeon: Sunny Grove M.D.;  Location: [facility];  Service:    • ERCP W/REM FB AND/OR STENT CHG N/A 7/11/2016    Procedure: ERCP W/REM FB AND/OR STENT CHG - STENT EXCHANGE;  Surgeon: Sunny Grove M.D.;  Location: [facility];  Service:    • DIAMANTE BY LAPAROSCOPY  2/10/2016    Procedure: DIAMANTE BY LAPAROSCOPY;  Surgeon: Elver Turcios M.D.;  Location: [facility];  Service:    • ERCP IN OR N/A 2/8/2016    Procedure: ERCP IN OR;  Surgeon: Sunny Grove M.D.;  Location: [facility];  Service:      Family History   Problem Relation Age of Onset   • No Known Problems Mother    • No Known Problems Father      Social History     Social History   • Marital status:      Spouse name: N/A   • Number of children: N/A   • Years of education: N/A     Occupational History   • Not on file.     Social History Main Topics   • Smoking status: Former Smoker     Years: 0.00     Types: Cigarettes     Quit date: 1/1/1960   • Smokeless tobacco: Never Used   • Alcohol use No   • Drug use: No   • Sexual activity: Not Currently     Partners: Female     Other Topics Concern   • Not on file     Social History Narrative   • No narrative on file     No Known Allergies  Outpatient Encounter Prescriptions as of 9/6/2018   Medication Sig Dispense Refill   • metoprolol SR (TOPROL XL) 25 MG TABLET SR 24 HR Take 1 Tab by mouth every day. 90 Tab 3   • losartan (COZAAR) 50 MG Tab Take 1 Tab by mouth every day. 90 Tab 3   • atorvastatin (LIPITOR) 10 MG Tab Take 1 Tab by mouth every evening. 90 Tab 3   • potassium chloride SA (KDUR) 20 MEQ Tab CR Take 1 Tab by mouth every day. 90 Tab 3   • megestrol (MEGACE) 20 MG Tab Take 1 Tab by mouth every day. 90 Tab 3   • terazosin (HYTRIN) 5 MG Cap Take 1 Cap by mouth every day. 90 Cap 3   • aspirin EC 81 MG EC tablet Take  "1 Tab by mouth every day. (Patient taking differently: Take 81 mg by mouth every evening.) 30 Tab 3   • Vitamin D, Cholecalciferol, 1000 UNITS Cap Take 2,000 Units by mouth every day at 6 PM.       No facility-administered encounter medications on file as of 9/6/2018.      Review of Systems   All other systems reviewed and are negative.       Objective:   /60   Pulse 76   Resp 14   Ht 1.727 m (5' 8\")   Wt 56.7 kg (125 lb)   SpO2 97%   BMI 19.01 kg/m²     Physical Exam   Constitutional: He is oriented to person, place, and time. He appears well-developed and well-nourished. No distress.   Elderly and frail  Very slow get up and go  Appears much more thin than prior   HENT:   Head: Normocephalic and atraumatic.   Right Ear: External ear normal.   Left Ear: External ear normal.   Eyes: Pupils are equal, round, and reactive to light. Conjunctivae and EOM are normal. Right eye exhibits no discharge. Left eye exhibits no discharge. No scleral icterus.   Neck: Normal range of motion. Neck supple. No JVD present. No tracheal deviation present. No thyromegaly present.   Cardiovascular: Normal rate, regular rhythm and intact distal pulses.  PMI is not displaced.  Exam reveals no gallop and no friction rub.    Murmur ( 3/6 systolic ejection murmur right upper sternal border) heard.  Pulses:       Carotid pulses are 2+ on the right side, and 2+ on the left side.       Radial pulses are 2+ on the left side.        Popliteal pulses are 2+ on the right side, and 2+ on the left side.        Dorsalis pedis pulses are 2+ on the right side, and 2+ on the left side.        Posterior tibial pulses are 2+ on the right side, and 2+ on the left side.   Pulmonary/Chest: Effort normal and breath sounds normal. No respiratory distress. He has no wheezes. He has no rales. He exhibits no tenderness.   Abdominal: Soft. Bowel sounds are normal. He exhibits no distension. There is no tenderness.   Musculoskeletal: Normal range of " motion. He exhibits no edema, tenderness or deformity.   Neurological: He is alert and oriented to person, place, and time. No cranial nerve deficit (cranial nerves II through XII grossly intact). Coordination normal.   Left arm tremor   Skin: Skin is warm and dry. No rash noted. He is not diaphoretic. No erythema. No pallor.   Psychiatric: He has a normal mood and affect. His behavior is normal. Thought content normal.   Vitals reviewed.  Imaging reviewed and discussed as above with the patient.  LABS:  Lab Results   Component Value Date/Time    CHOLSTRLTOT 147 11/01/2017 09:30 AM    LDL 76 11/01/2017 09:30 AM    HDL 58 11/01/2017 09:30 AM    TRIGLYCERIDE 66 11/01/2017 09:30 AM       Lab Results   Component Value Date/Time    WBC 8.1 08/01/2018 02:45 AM    RBC 3.41 (L) 08/01/2018 02:45 AM    HEMOGLOBIN 10.4 (L) 08/01/2018 02:45 AM    HEMATOCRIT 31.6 (L) 08/01/2018 02:45 AM    MCV 92.7 08/01/2018 02:45 AM    NEUTSPOLYS 83.60 (H) 08/01/2018 02:45 AM    LYMPHOCYTES 6.50 (L) 08/01/2018 02:45 AM    MONOCYTES 7.10 08/01/2018 02:45 AM    EOSINOPHILS 2.20 08/01/2018 02:45 AM    BASOPHILS 0.20 08/01/2018 02:45 AM     Lab Results   Component Value Date/Time    SODIUM 140 08/28/2018 02:31 PM    POTASSIUM 3.9 08/28/2018 02:31 PM    CHLORIDE 107 08/28/2018 02:31 PM    CO2 23 08/28/2018 02:31 PM    GLUCOSE 89 08/28/2018 02:31 PM    BUN 21 08/28/2018 02:31 PM    CREATININE 0.80 08/28/2018 02:31 PM    BUNCREATRAT 25 (H) 11/01/2017 09:30 AM     Lab Results   Component Value Date    HBA1C 5.5 11/01/2017      Lab Results   Component Value Date/Time    ALKPHOSPHAT 141 (H) 08/28/2018 02:31 PM    ASTSGOT 26 08/28/2018 02:31 PM    ALTSGPT 35 08/28/2018 02:31 PM    TBILIRUBIN 0.6 08/28/2018 02:31 PM      Lab Results   Component Value Date/Time    BNPBTYPENAT 62 07/30/2018 08:58 PM      No results found for: TSH  Lab Results   Component Value Date/Time    PROTHROMBTM 15.0 (H) 07/31/2018 01:05 AM    INR 1.21 (H) 07/31/2018 01:05 AM           Assessment:     1. Coronary artery disease involving native coronary artery of native heart without angina pectoris     2. Failure to thrive in adult     3. Frailty     4. Essential hypertension         Medical Decision Making:  Today's Assessment / Status / Plan:     He has no cardiac symptoms and in fact is becoming increasingly frail and demonstrating failure to thrive likely related to his medical comorbidities and advanced age.  I suggested interventions related to improving his nutritional status and preserving his independence as much as possible.  They do have help at home, he lives with his wife who is quite elderly but very well for 89 years of age and they do have advanced directives in place indicating a DO NOT RESUSCITATE status for him.  We discussed these things today.  No medical changes otherwise.    FU6M

## 2018-09-07 ENCOUNTER — HOME CARE VISIT (OUTPATIENT)
Dept: HOME HEALTH SERVICES | Facility: HOME HEALTHCARE | Age: 83
End: 2018-09-07
Payer: MEDICARE

## 2018-09-07 VITALS
SYSTOLIC BLOOD PRESSURE: 130 MMHG | HEART RATE: 89 BPM | RESPIRATION RATE: 20 BRPM | OXYGEN SATURATION: 99 % | DIASTOLIC BLOOD PRESSURE: 52 MMHG | TEMPERATURE: 97.4 F

## 2018-09-07 PROCEDURE — G0156 HHCP-SVS OF AIDE,EA 15 MIN: HCPCS

## 2018-09-07 PROCEDURE — 665999 HH PPS REVENUE DEBIT

## 2018-09-07 PROCEDURE — 665998 HH PPS REVENUE CREDIT

## 2018-09-08 PROCEDURE — 665999 HH PPS REVENUE DEBIT

## 2018-09-08 PROCEDURE — 665998 HH PPS REVENUE CREDIT

## 2018-09-09 PROCEDURE — 665999 HH PPS REVENUE DEBIT

## 2018-09-09 PROCEDURE — 665998 HH PPS REVENUE CREDIT

## 2018-09-10 PROCEDURE — 665998 HH PPS REVENUE CREDIT

## 2018-09-10 PROCEDURE — 665999 HH PPS REVENUE DEBIT

## 2018-09-11 ENCOUNTER — HOME CARE VISIT (OUTPATIENT)
Dept: HOME HEALTH SERVICES | Facility: HOME HEALTHCARE | Age: 83
End: 2018-09-11
Payer: MEDICARE

## 2018-09-11 VITALS
RESPIRATION RATE: 20 BRPM | OXYGEN SATURATION: 98 % | DIASTOLIC BLOOD PRESSURE: 56 MMHG | HEART RATE: 79 BPM | TEMPERATURE: 99 F | SYSTOLIC BLOOD PRESSURE: 100 MMHG

## 2018-09-11 PROCEDURE — 665999 HH PPS REVENUE DEBIT

## 2018-09-11 PROCEDURE — 665998 HH PPS REVENUE CREDIT

## 2018-09-11 PROCEDURE — G0156 HHCP-SVS OF AIDE,EA 15 MIN: HCPCS

## 2018-09-12 ENCOUNTER — HOME CARE VISIT (OUTPATIENT)
Dept: HOME HEALTH SERVICES | Facility: HOME HEALTHCARE | Age: 83
End: 2018-09-12
Payer: MEDICARE

## 2018-09-12 PROCEDURE — 665999 HH PPS REVENUE DEBIT

## 2018-09-12 PROCEDURE — G0299 HHS/HOSPICE OF RN EA 15 MIN: HCPCS

## 2018-09-12 PROCEDURE — 665998 HH PPS REVENUE CREDIT

## 2018-09-12 NOTE — PROGRESS NOTES
Donato Burciaga was admitted to [facility] [place] on 7/30/18 for cholangitis, and was discharged on 8/4/18. [facility] patient advocate was able to successfully engage with the patient’s authorized caregiver post-discharge and many times throughout the life cycle. Per discharge orders, patient was discharged with home health through [facility] for skilled nursing, physical therapy, occupational therapy and a home health aide. Patient also had a follow-up appointment with Dr. Mendoza (discharge clinic) and Dr. Burk (PCP) on 8/28/18. Patient has a future appointment with Dr. Rodas (cardiology) on 9/6/18. PPS screening was conducted at 60%.

## 2018-09-13 PROCEDURE — 665998 HH PPS REVENUE CREDIT

## 2018-09-13 PROCEDURE — 665999 HH PPS REVENUE DEBIT

## 2018-09-13 SDOH — ECONOMIC STABILITY: HOUSING INSECURITY: UNSAFE COOKING RANGE AREA: 0

## 2018-09-13 SDOH — ECONOMIC STABILITY: HOUSING INSECURITY: UNSAFE APPLIANCES: 0

## 2018-09-14 ENCOUNTER — HOME CARE VISIT (OUTPATIENT)
Dept: HOME HEALTH SERVICES | Facility: HOME HEALTHCARE | Age: 83
End: 2018-09-14
Payer: MEDICARE

## 2018-09-14 VITALS
RESPIRATION RATE: 20 BRPM | DIASTOLIC BLOOD PRESSURE: 65 MMHG | OXYGEN SATURATION: 98 % | HEART RATE: 104 BPM | SYSTOLIC BLOOD PRESSURE: 135 MMHG | TEMPERATURE: 98.7 F

## 2018-09-14 PROCEDURE — 665999 HH PPS REVENUE DEBIT

## 2018-09-14 PROCEDURE — G0156 HHCP-SVS OF AIDE,EA 15 MIN: HCPCS

## 2018-09-14 PROCEDURE — 665998 HH PPS REVENUE CREDIT

## 2018-09-15 PROCEDURE — 665999 HH PPS REVENUE DEBIT

## 2018-09-15 PROCEDURE — 665998 HH PPS REVENUE CREDIT

## 2018-09-16 PROCEDURE — 665999 HH PPS REVENUE DEBIT

## 2018-09-16 PROCEDURE — 665998 HH PPS REVENUE CREDIT

## 2018-09-17 VITALS
DIASTOLIC BLOOD PRESSURE: 58 MMHG | SYSTOLIC BLOOD PRESSURE: 112 MMHG | TEMPERATURE: 97.9 F | HEART RATE: 67 BPM | RESPIRATION RATE: 18 BRPM | OXYGEN SATURATION: 98 %

## 2018-09-17 PROCEDURE — 665999 HH PPS REVENUE DEBIT

## 2018-09-17 PROCEDURE — 665998 HH PPS REVENUE CREDIT

## 2018-09-17 ASSESSMENT — ENCOUNTER SYMPTOMS
RESPIRATORY SYMPTOMS COMMENTS: DENIES SOB, RESP EVEN AND UNLABORED
NAUSEA: DENIES

## 2018-09-18 ENCOUNTER — HOME CARE VISIT (OUTPATIENT)
Dept: HOME HEALTH SERVICES | Facility: HOME HEALTHCARE | Age: 83
End: 2018-09-18
Payer: MEDICARE

## 2018-09-18 VITALS
DIASTOLIC BLOOD PRESSURE: 78 MMHG | TEMPERATURE: 99.9 F | OXYGEN SATURATION: 98 % | RESPIRATION RATE: 20 BRPM | SYSTOLIC BLOOD PRESSURE: 143 MMHG | HEART RATE: 101 BPM

## 2018-09-18 PROCEDURE — G0156 HHCP-SVS OF AIDE,EA 15 MIN: HCPCS

## 2018-09-18 PROCEDURE — 665999 HH PPS REVENUE DEBIT

## 2018-09-18 PROCEDURE — 665998 HH PPS REVENUE CREDIT

## 2018-09-19 ENCOUNTER — HOME CARE VISIT (OUTPATIENT)
Dept: HOME HEALTH SERVICES | Facility: HOME HEALTHCARE | Age: 83
End: 2018-09-19
Payer: MEDICARE

## 2018-09-19 PROCEDURE — 665999 HH PPS REVENUE DEBIT

## 2018-09-19 PROCEDURE — 665998 HH PPS REVENUE CREDIT

## 2018-09-19 PROCEDURE — G0300 HHS/HOSPICE OF LPN EA 15 MIN: HCPCS

## 2018-09-20 PROCEDURE — 665998 HH PPS REVENUE CREDIT

## 2018-09-20 PROCEDURE — 665999 HH PPS REVENUE DEBIT

## 2018-09-21 ENCOUNTER — HOME CARE VISIT (OUTPATIENT)
Dept: HOME HEALTH SERVICES | Facility: HOME HEALTHCARE | Age: 83
End: 2018-09-21
Payer: MEDICARE

## 2018-09-21 VITALS
OXYGEN SATURATION: 97 % | DIASTOLIC BLOOD PRESSURE: 50 MMHG | HEART RATE: 80 BPM | RESPIRATION RATE: 20 BRPM | TEMPERATURE: 99.4 F | SYSTOLIC BLOOD PRESSURE: 99 MMHG

## 2018-09-21 PROCEDURE — G0156 HHCP-SVS OF AIDE,EA 15 MIN: HCPCS

## 2018-09-21 PROCEDURE — 665999 HH PPS REVENUE DEBIT

## 2018-09-21 PROCEDURE — 665998 HH PPS REVENUE CREDIT

## 2018-09-22 PROCEDURE — 665999 HH PPS REVENUE DEBIT

## 2018-09-22 PROCEDURE — 665998 HH PPS REVENUE CREDIT

## 2018-09-23 PROCEDURE — 665999 HH PPS REVENUE DEBIT

## 2018-09-23 PROCEDURE — 665998 HH PPS REVENUE CREDIT

## 2018-09-24 VITALS
RESPIRATION RATE: 20 BRPM | HEART RATE: 68 BPM | DIASTOLIC BLOOD PRESSURE: 58 MMHG | OXYGEN SATURATION: 98 % | TEMPERATURE: 97.5 F | SYSTOLIC BLOOD PRESSURE: 132 MMHG

## 2018-09-24 PROCEDURE — 665999 HH PPS REVENUE DEBIT

## 2018-09-24 PROCEDURE — 665998 HH PPS REVENUE CREDIT

## 2018-09-24 ASSESSMENT — ENCOUNTER SYMPTOMS
NAUSEA: DENIES
VOMITING: DENIES

## 2018-09-25 ENCOUNTER — HOME CARE VISIT (OUTPATIENT)
Dept: HOME HEALTH SERVICES | Facility: HOME HEALTHCARE | Age: 83
End: 2018-09-25
Payer: MEDICARE

## 2018-09-25 VITALS
OXYGEN SATURATION: 97 % | TEMPERATURE: 98.9 F | SYSTOLIC BLOOD PRESSURE: 128 MMHG | RESPIRATION RATE: 20 BRPM | HEART RATE: 70 BPM | DIASTOLIC BLOOD PRESSURE: 61 MMHG

## 2018-09-25 PROCEDURE — 665999 HH PPS REVENUE DEBIT

## 2018-09-25 PROCEDURE — 665998 HH PPS REVENUE CREDIT

## 2018-09-25 PROCEDURE — G0156 HHCP-SVS OF AIDE,EA 15 MIN: HCPCS

## 2018-09-26 ENCOUNTER — HOME CARE VISIT (OUTPATIENT)
Dept: HOME HEALTH SERVICES | Facility: HOME HEALTHCARE | Age: 83
End: 2018-09-26
Payer: MEDICARE

## 2018-09-26 PROCEDURE — G0299 HHS/HOSPICE OF RN EA 15 MIN: HCPCS

## 2018-09-26 PROCEDURE — 665999 HH PPS REVENUE DEBIT

## 2018-09-26 PROCEDURE — 665998 HH PPS REVENUE CREDIT

## 2018-09-27 ENCOUNTER — HOME CARE VISIT (OUTPATIENT)
Dept: HOME HEALTH SERVICES | Facility: HOME HEALTHCARE | Age: 83
End: 2018-09-27
Payer: MEDICARE

## 2018-09-27 VITALS
OXYGEN SATURATION: 98 % | TEMPERATURE: 98.6 F | HEART RATE: 80 BPM | RESPIRATION RATE: 20 BRPM | SYSTOLIC BLOOD PRESSURE: 119 MMHG | DIASTOLIC BLOOD PRESSURE: 61 MMHG

## 2018-09-27 PROCEDURE — G0156 HHCP-SVS OF AIDE,EA 15 MIN: HCPCS

## 2018-09-27 PROCEDURE — 665999 HH PPS REVENUE DEBIT

## 2018-09-27 PROCEDURE — 665998 HH PPS REVENUE CREDIT

## 2018-09-28 VITALS
HEART RATE: 64 BPM | RESPIRATION RATE: 20 BRPM | DIASTOLIC BLOOD PRESSURE: 50 MMHG | TEMPERATURE: 98.3 F | SYSTOLIC BLOOD PRESSURE: 116 MMHG | OXYGEN SATURATION: 99 %

## 2018-09-28 PROCEDURE — 665999 HH PPS REVENUE DEBIT

## 2018-09-28 PROCEDURE — 665998 HH PPS REVENUE CREDIT

## 2018-09-29 PROCEDURE — 665999 HH PPS REVENUE DEBIT

## 2018-09-29 PROCEDURE — 665998 HH PPS REVENUE CREDIT

## 2018-09-30 PROCEDURE — 665998 HH PPS REVENUE CREDIT

## 2018-09-30 PROCEDURE — 665999 HH PPS REVENUE DEBIT

## 2018-10-01 ENCOUNTER — HOME CARE VISIT (OUTPATIENT)
Dept: HOME HEALTH SERVICES | Facility: HOME HEALTHCARE | Age: 83
End: 2018-10-01
Payer: MEDICARE

## 2018-10-01 VITALS
DIASTOLIC BLOOD PRESSURE: 58 MMHG | OXYGEN SATURATION: 98 % | SYSTOLIC BLOOD PRESSURE: 114 MMHG | HEART RATE: 72 BPM | RESPIRATION RATE: 20 BRPM | TEMPERATURE: 99.2 F

## 2018-10-01 VITALS
TEMPERATURE: 99.4 F | OXYGEN SATURATION: 98 % | SYSTOLIC BLOOD PRESSURE: 114 MMHG | HEART RATE: 71 BPM | DIASTOLIC BLOOD PRESSURE: 58 MMHG | RESPIRATION RATE: 20 BRPM

## 2018-10-01 PROCEDURE — G0156 HHCP-SVS OF AIDE,EA 15 MIN: HCPCS

## 2018-10-01 PROCEDURE — 665999 HH PPS REVENUE DEBIT

## 2018-10-01 PROCEDURE — G0493 RN CARE EA 15 MIN HH/HOSPICE: HCPCS

## 2018-10-01 PROCEDURE — 665998 HH PPS REVENUE CREDIT

## 2018-10-01 SDOH — ECONOMIC STABILITY: HOUSING INSECURITY: HOME SAFETY: PT HAS MULTIPLE THROW RUGS, AND ONE MEDIUM DOG THAT CREATES A POTENTIAL RISK AS A TRIP/SLIP HAZARD.

## 2018-10-01 SDOH — ECONOMIC STABILITY: HOUSING INSECURITY: UNSAFE COOKING RANGE AREA: 0

## 2018-10-01 SDOH — ECONOMIC STABILITY: HOUSING INSECURITY: UNSAFE APPLIANCES: 0

## 2018-10-01 ASSESSMENT — ENCOUNTER SYMPTOMS
DIFFICULTY THINKING: 1
DIFFICULTY THINKING: 1
RESPIRATORY SYMPTOMS COMMENTS: DENIES SOB, RESP EVEN AND UNLABORED
POOR JUDGMENT: 1
POOR JUDGMENT: 1

## 2018-10-01 ASSESSMENT — ACTIVITIES OF DAILY LIVING (ADL): HOME_HEALTH_OASIS: 01

## 2018-10-02 PROCEDURE — 665999 HH PPS REVENUE DEBIT

## 2018-10-02 PROCEDURE — 665998 HH PPS REVENUE CREDIT

## 2018-10-02 ASSESSMENT — ACTIVITIES OF DAILY LIVING (ADL): OASIS_M1830: 03

## 2018-10-03 PROCEDURE — 665998 HH PPS REVENUE CREDIT

## 2018-10-03 PROCEDURE — 665999 HH PPS REVENUE DEBIT

## 2018-10-04 PROCEDURE — 665999 HH PPS REVENUE DEBIT

## 2018-10-04 PROCEDURE — 665998 HH PPS REVENUE CREDIT

## 2018-10-09 ENCOUNTER — APPOINTMENT (RX ONLY)
Dept: URBAN - METROPOLITAN AREA CLINIC 4 | Facility: CLINIC | Age: 83
Setting detail: DERMATOLOGY
End: 2018-10-09

## 2018-10-09 DIAGNOSIS — Z71.89 OTHER SPECIFIED COUNSELING: ICD-10-CM

## 2018-10-09 DIAGNOSIS — D485 NEOPLASM OF UNCERTAIN BEHAVIOR OF SKIN: ICD-10-CM

## 2018-10-09 DIAGNOSIS — D22 MELANOCYTIC NEVI: ICD-10-CM

## 2018-10-09 DIAGNOSIS — L81.4 OTHER MELANIN HYPERPIGMENTATION: ICD-10-CM

## 2018-10-09 DIAGNOSIS — L21.8 OTHER SEBORRHEIC DERMATITIS: ICD-10-CM

## 2018-10-09 DIAGNOSIS — D18.0 HEMANGIOMA: ICD-10-CM

## 2018-10-09 DIAGNOSIS — L82.1 OTHER SEBORRHEIC KERATOSIS: ICD-10-CM

## 2018-10-09 PROBLEM — E78.5 HYPERLIPIDEMIA, UNSPECIFIED: Status: ACTIVE | Noted: 2018-10-09

## 2018-10-09 PROBLEM — I10 ESSENTIAL (PRIMARY) HYPERTENSION: Status: ACTIVE | Noted: 2018-10-09

## 2018-10-09 PROBLEM — D18.01 HEMANGIOMA OF SKIN AND SUBCUTANEOUS TISSUE: Status: ACTIVE | Noted: 2018-10-09

## 2018-10-09 PROBLEM — D22.5 MELANOCYTIC NEVI OF TRUNK: Status: ACTIVE | Noted: 2018-10-09

## 2018-10-09 PROBLEM — D48.5 NEOPLASM OF UNCERTAIN BEHAVIOR OF SKIN: Status: ACTIVE | Noted: 2018-10-09

## 2018-10-09 PROCEDURE — ? DEFER

## 2018-10-09 PROCEDURE — ? COUNSELING

## 2018-10-09 PROCEDURE — 99203 OFFICE O/P NEW LOW 30 MIN: CPT | Mod: 25

## 2018-10-09 PROCEDURE — 11100: CPT

## 2018-10-09 PROCEDURE — ? BIOPSY BY SHAVE METHOD

## 2018-10-09 PROCEDURE — ? DIAGNOSIS COMMENT

## 2018-10-09 ASSESSMENT — LOCATION SIMPLE DESCRIPTION DERM
LOCATION SIMPLE: UPPER BACK
LOCATION SIMPLE: LEFT UPPER BACK
LOCATION SIMPLE: POSTERIOR SCALP
LOCATION SIMPLE: NOSE
LOCATION SIMPLE: LEFT FOREHEAD
LOCATION SIMPLE: RIGHT UPPER BACK
LOCATION SIMPLE: LEFT EAR
LOCATION SIMPLE: LEFT EAR
LOCATION SIMPLE: LEFT UPPER BACK

## 2018-10-09 ASSESSMENT — LOCATION DETAILED DESCRIPTION DERM
LOCATION DETAILED: LEFT MEDIAL UPPER BACK
LOCATION DETAILED: SUPERIOR THORACIC SPINE
LOCATION DETAILED: NASAL ROOT
LOCATION DETAILED: LEFT SUPERIOR CRUS OF ANTIHELIX
LOCATION DETAILED: LEFT SUPERIOR UPPER BACK
LOCATION DETAILED: MID-OCCIPITAL SCALP
LOCATION DETAILED: LEFT INFERIOR MEDIAL FOREHEAD
LOCATION DETAILED: LEFT SUPERIOR UPPER BACK
LOCATION DETAILED: LEFT SUPERIOR CRUS OF ANTIHELIX
LOCATION DETAILED: RIGHT MEDIAL UPPER BACK

## 2018-10-09 ASSESSMENT — LOCATION ZONE DERM
LOCATION ZONE: TRUNK
LOCATION ZONE: NOSE
LOCATION ZONE: EAR
LOCATION ZONE: FACE
LOCATION ZONE: EAR
LOCATION ZONE: SCALP
LOCATION ZONE: TRUNK

## 2018-10-09 NOTE — PROCEDURE: DIAGNOSIS COMMENT
Detail Level: Zone
Comment: Clinically suspicious for BCC, discussed biopsy. Concern discussed at length with patient and wife. Patient declines biopsy of this lesion at this time. Will reconsider if lesion becomes symptomatic.

## 2018-10-09 NOTE — PROCEDURE: BIOPSY BY SHAVE METHOD
Anesthesia Type: 1% lidocaine with 1:100,000 epinephrine and 408mcg clindamycin/ml and a 1:10 solution of 8.4% sodium bicarbonate
Hemostasis: Pressure
Depth Of Biopsy: dermis
Silver Nitrate Text: The wound bed was treated with silver nitrate after the biopsy was performed.
Lab Facility: [id number]
Was A Bandage Applied: Yes
Anesthesia Volume In Cc: 0
Type Of Destruction Used: Curettage
Wound Care: Petrolatum
Lab: [id number]
Billing Type: Third-Party Bill
Detail Level: Detailed
Electrodesiccation Text: The wound bed was treated with electrodesiccation after the biopsy was performed.
Render Post-Care Instructions In Note?: no
Cryotherapy Text: The wound bed was treated with cryotherapy after the biopsy was performed.
Notification Instructions: Patient will be notified of biopsy results. However, patient instructed to call the office if not contacted within 2 weeks.
Electrodesiccation And Curettage Text: The wound bed was treated with electrodesiccation and curettage after the biopsy was performed.
Dressing: bandage
Biopsy Type: H and E
Biopsy Method: Dermablade
Post-Care Instructions: I reviewed with the patient in detail post-care instructions. Patient is to keep the biopsy site dry overnight, and then apply bacitracin twice daily until healed. Patient may apply hydrogen peroxide soaks to remove any crusting.
Curettage Text: The wound bed was treated with curettage after the biopsy was performed.
Consent: Written consent was obtained and risks were reviewed including but not limited to scarring, infection, bleeding, scabbing, incomplete removal, nerve damage and allergy to anesthesia.

## 2018-10-09 NOTE — HPI: EVALUATION OF SKIN LESION(S)
How Severe Are Your Spot(S)?: mild
Have Your Spot(S) Been Treated In The Past?: has not been treated
Hpi Title: Evaluation of Skin Lesions
Additional History: Patient has assistance bathing however, the person who bathed him noticed this lesion and stated patient needs to have it evaluated. Patient is in for an UBE.

## 2018-11-13 ENCOUNTER — OFFICE VISIT (OUTPATIENT)
Dept: MEDICAL GROUP | Facility: MEDICAL CENTER | Age: 83
End: 2018-11-13
Payer: MEDICARE

## 2018-11-13 VITALS
BODY MASS INDEX: 20.52 KG/M2 | SYSTOLIC BLOOD PRESSURE: 110 MMHG | HEART RATE: 101 BPM | WEIGHT: 135.36 LBS | HEIGHT: 68 IN | DIASTOLIC BLOOD PRESSURE: 56 MMHG | OXYGEN SATURATION: 96 % | TEMPERATURE: 98.2 F

## 2018-11-13 DIAGNOSIS — G25.0 BENIGN ESSENTIAL TREMOR: ICD-10-CM

## 2018-11-13 DIAGNOSIS — I10 ESSENTIAL HYPERTENSION: ICD-10-CM

## 2018-11-13 DIAGNOSIS — E78.5 DYSLIPIDEMIA: ICD-10-CM

## 2018-11-13 DIAGNOSIS — R73.02 IGT (IMPAIRED GLUCOSE TOLERANCE): ICD-10-CM

## 2018-11-13 DIAGNOSIS — Z91.81 RISK FOR FALLS: ICD-10-CM

## 2018-11-13 DIAGNOSIS — I25.10 CORONARY ARTERY DISEASE INVOLVING NATIVE CORONARY ARTERY OF NATIVE HEART WITHOUT ANGINA PECTORIS: ICD-10-CM

## 2018-11-13 DIAGNOSIS — R62.7 FAILURE TO THRIVE IN ADULT: ICD-10-CM

## 2018-11-13 DIAGNOSIS — Z00.00 MEDICARE ANNUAL WELLNESS VISIT, SUBSEQUENT: ICD-10-CM

## 2018-11-13 DIAGNOSIS — N40.1 BENIGN NON-NODULAR PROSTATIC HYPERPLASIA WITH LOWER URINARY TRACT SYMPTOMS: ICD-10-CM

## 2018-11-13 DIAGNOSIS — I25.2 HISTORY OF NON-ST ELEVATION MYOCARDIAL INFARCTION (NSTEMI): ICD-10-CM

## 2018-11-13 PROCEDURE — G0439 PPPS, SUBSEQ VISIT: HCPCS | Performed by: INTERNAL MEDICINE

## 2018-11-13 RX ORDER — PRAVASTATIN SODIUM 20 MG
TABLET ORAL
COMMUNITY
End: 2018-11-13

## 2018-11-13 ASSESSMENT — PATIENT HEALTH QUESTIONNAIRE - PHQ9: CLINICAL INTERPRETATION OF PHQ2 SCORE: 0

## 2018-11-13 ASSESSMENT — ACTIVITIES OF DAILY LIVING (ADL): BATHING_REQUIRES_ASSISTANCE: 0

## 2018-11-13 ASSESSMENT — ENCOUNTER SYMPTOMS: GENERAL WELL-BEING: GOOD

## 2018-11-13 NOTE — PROGRESS NOTES
CC: Follow-up failure to thrive due for wellness examination.    HPI:   Donato presents today with the following.      1. Medicare annual wellness visit, subsequent  Screenings performed below information given on advanced directives.    2. Failure to thrive in adult  Presents having gained 10 pounds in the last 3 months.  He was placed on Megace but wife also is now giving him a broader range of diet after discussion with cardiology reporting that he needs to eat.  Patient is still quite upset that he cannot drive but his reflexes on a driving test were quite poor and he has severe tremor.  Wife reports he does have some memory concerns.  She reports this on every visit.  Patient declines wanting to have any further evaluation for such things.      Information for advance directives given to patient or instructed to bring in advance directives into to office to put in chart.      Depression Screening    Little interest or pleasure in doing things?  0 - not at all  Feeling down, depressed , or hopeless? 0 - not at all  Patient Health Questionnaire Score: 0     If depressive symptoms identified deferred to follow up visit unless specifically addressed in assessment and plan.    Interpretation of PHQ-9 Total Score   Score Severity   1-4 No Depression   5-9 Mild Depression   10-14 Moderate Depression   15-19 Moderately Severe Depression   20-27 Severe Depression    Screening for Cognitive Impairment    Three Minute Recall (leader, season, table) 2/3    Diego clock face with all 12 numbers and set the hands to show 10 past 11.  No 0/2 PT has tremors unable to draw clock  Cognitive concerns identified deferred for follow up unless specifically addressed in assessment and plan.    Fall Risk Assessment    Has the patient had two or more falls in the last year or any fall with injury in the last year?  Yes    Safety Assessment    Throw rugs on floor.  No  Handrails on all stairs.  Yes  Good lighting in all hallways.   Yes  Difficulty hearing.  No  Patient counseled about all safety risks that were identified.    Functional Assessment ADLs    Are there any barriers preventing you from cooking for yourself or meeting nutritional needs?  Yes. Wife does cooking.  Are there any barriers preventing you from driving safely or obtaining transportation?  No. PT does not drive. Gets rides.  Are there any barriers preventing you from using a telephone or calling for help?  No.    Are there any barriers preventing you from shopping?  No.    Are there any barriers preventing you from taking care of your own finances?  Yes. Wife manages finances.  Are there any barriers preventing you from managing your medications?  Yes. Wife manages meds.  Are there any barriers preventing you from showering, bathing or dressing yourself?  No.    Are you currently engaging in any exercise or physical activity?  No.     What is your perception of your health?  Good.      Health Maintenance Summary                IMM ZOSTER VACCINES Postponed 9/10/2026 Originally 6/29/2011. Insurance/Financial     Done 5/4/2011 Imm Admin: Zoster Vaccine Live (ZVL) (Zostavax)    Annual Wellness Visit Next Due 11/14/2019      Done 11/13/2018 Visit Dx: Medicare annual wellness visit, subsequent     Patient has more history with this topic...          Patient Care Team:  Fabiano Burk M.D. as PCP - General (Internal Medicine)  Fabiano Warner M.D. as [facility] (Oncology)  Dain Barrett M.D. as Consulting Physician (Radiation Therapy)  Sunny Grove M.D. as Consulting Physician (Gastroenterology)  [facility] as Home Health Provider            Health Care Screening: Age-appropriate preventive services that Medicare covers were discussed today and ordered as indicated and agreed upon by the patient, and as recommended by USPTF and ACIP.     Patient Active Problem List    Diagnosis Date Noted   • Near syncope 02/20/2018     Priority: High   • Hypokalemia  04/18/2018     Priority: Medium   • Coronary artery disease involving native coronary artery of native heart without angina pectoris 03/07/2018     Priority: Medium   • LFTs abnormal 12/06/2017     Priority: Medium   • Essential hypertension 01/26/2015     Priority: Medium   • History of gallbladder cancer 11/09/2017     Priority: Low   • Anemia 02/09/2016     Priority: Low   • Dyslipidemia 01/26/2015     Priority: Low   • Risk for falls 11/13/2018   • Failure to thrive in adult 09/06/2018   • Normocytic anemia 07/31/2018   • Appetite loss 03/07/2018   • History of non-ST elevation myocardial infarction (NSTEMI) 02/21/2018   • Benign essential tremor 11/03/2016   • IGT (impaired glucose tolerance) 11/30/2015   • Benign non-nodular prostatic hyperplasia with lower urinary tract symptoms 08/03/2015       Current Outpatient Prescriptions   Medication Sig Dispense Refill   • metoprolol SR (TOPROL XL) 25 MG TABLET SR 24 HR Take 1 Tab by mouth every day. 90 Tab 3   • losartan (COZAAR) 50 MG Tab Take 1 Tab by mouth every day. 90 Tab 3   • atorvastatin (LIPITOR) 10 MG Tab Take 1 Tab by mouth every evening. 90 Tab 3   • potassium chloride SA (KDUR) 20 MEQ Tab CR Take 1 Tab by mouth every day. 90 Tab 3   • megestrol (MEGACE) 20 MG Tab Take 1 Tab by mouth every day. 90 Tab 3   • terazosin (HYTRIN) 5 MG Cap Take 1 Cap by mouth every day. 90 Cap 3   • aspirin EC 81 MG EC tablet Take 1 Tab by mouth every day. (Patient taking differently: Take 81 mg by mouth every evening.) 30 Tab 3   • Vitamin D, Cholecalciferol, 1000 UNITS Cap Take 2,000 Units by mouth every day at 6 PM.       No current facility-administered medications for this visit.        Family History   Problem Relation Age of Onset   • No Known Problems Mother    • No Known Problems Father        Social History     Social History   • Marital status:      Spouse name: N/A   • Number of children: N/A   • Years of education: N/A     Occupational History   • Not on  file.     Social History Main Topics   • Smoking status: Former Smoker     Packs/day: 1.00     Years: 16.00     Types: Cigarettes     Quit date: 1962   • Smokeless tobacco: Never Used      Comment: started at 17   • Alcohol use No   • Drug use: No   • Sexual activity: No     Other Topics Concern   • Not on file     Social History Narrative   • No narrative on file       Past Surgical History:   Procedure Laterality Date   • ERCP IN OR N/A 8/3/2018    Procedure: ERCP IN OR;  Surgeon: Amanuel Escamilla M.D.;  Location: SURGERY SAME DAY [facility];  Service: Gastroenterology   • ERCP IN OR  4/19/2018    Procedure: ERCP IN OR W/METAL STENT PLACEMENT;  Surgeon: Everett Blackwell M.D.;  Location: [facility];  Service: Gastroenterology   • CARDIAC CATH  02/21/2018    PTCA to 98% LAD, dilated to 40%, no stent.   • ERCP W/REM FB AND/OR STENT CHG N/A 12/5/2016    Procedure: ERCP W/REM FB AND/OR STENT CHG - PYLORIC STENT REMOVAL SWAP METAL STENT PLACEMENT;  Surgeon: Sunny Grove M.D.;  Location: [facility];  Service:    • GASTROSCOPY-ENDO N/A 7/11/2016    Procedure: GASTROSCOPY-ENDO;  Surgeon: Sunny Grove M.D.;  Location: [facility];  Service:    • EGD W/ENDOSCOPIC ULTRASOUND N/A 7/11/2016    Procedure: EGD W/ENDOSCOPIC ULTRASOUND - UPPER LINEAR;  Surgeon: Sunny Grove M.D.;  Location: [facility];  Service:    • ERCP W/REM FB AND/OR STENT CHG N/A 7/11/2016    Procedure: ERCP W/REM FB AND/OR STENT CHG - STENT EXCHANGE;  Surgeon: Sunny Grove M.D.;  Location: [facility];  Service:    • DIAMANTE BY LAPAROSCOPY  2/10/2016    Procedure: DIAMANTE BY LAPAROSCOPY;  Surgeon: Elver Turcios M.D.;  Location: [facility];  Service:    • ERCP IN OR N/A 2/8/2016    Procedure: ERCP IN OR;  Surgeon: Sunny Grove M.D.;  Location: [facility];  Service:        Allergies as of 11/13/2018   • (No Known Allergies)        ROS: Denies Chest pain, SOB, LE  "edema.    /56 (BP Location: Left arm, Patient Position: Sitting, BP Cuff Size: Adult)   Pulse (!) 101   Temp 36.8 °C (98.2 °F) (Temporal)   Ht 1.727 m (5' 8\")   Wt 61.4 kg (135 lb 5.8 oz)   SpO2 96%   BMI 20.58 kg/m²     Physical Exam:  Gen:         Alert and oriented, No apparent distress.  Neck:        No Lymphadenopathy or Bruits.  Lungs:     Clear to auscultation bilaterally  CV:          Regular rate and rhythm. No murmurs, rubs or gallops.  Abd:         Soft non tender, non distended. Normal active bowel sounds.  No  Hepatosplenomegaly, No pulsatile masses.                   Ext:          No clubbing, cyanosis, edema.      Assessment and Plan.   89 y.o. male with the following issues.    1. Medicare annual wellness visit, subsequent  Discussed healthy lifestyle habits as well as screening regimens.  - Annual Wellness Visit - Includes PPPS Subsequent ()    2. Failure to thrive in adult  Weight doing well likely components of dementia however patient is not a willing participant in seeing neurology despite repeated request to refer.  Discussed with wife if she wants to have further evaluation from a neurologist to contact the office.    3. Essential hypertension  Currently well controlled, Discuss diet, exercise and salt restriction.  No change to medication therapy.  - Annual Wellness Visit - Includes PPPS Subsequent ()    4. Coronary artery disease involving native coronary artery of native heart without angina pectoris  Stable no change in therapy  - Annual Wellness Visit - Includes PPPS Subsequent ()    5. Dyslipidemia  Continue statin  - Annual Wellness Visit - Includes PPPS Subsequent ()    6. Benign non-nodular prostatic hyperplasia with lower urinary tract symptoms  No signs of advancement  - Annual Wellness Visit - Includes PPPS Subsequent ()    7. IGT (impaired glucose tolerance)  Weight going up no major concern for diabetes given advanced age no further treatment " will continue to monitor sugars.  - Annual Wellness Visit - Includes PPPS Subsequent ()    8. Benign essential tremor  Again discussed neurology he declines.  - Annual Wellness Visit - Includes PPPS Subsequent ()    9. Risk for falls  Cautions given currently using walker  - Patient identified as fall risk.  Appropriate orders and counseling given.  - Annual Wellness Visit - Includes PPPS Subsequent ()          Referrals offered: Community-based lifestyle interventions to reduce health risks and promote self-management and wellness, fall prevention, nutrition, physical activity, tobacco-use cessation, weight loss, and mental health services as per orders if indicated.    Discussion today about general wellness and lifestyle habits:    · Prevent falls and reduce trip hazards; Cautioned about securing or removing rugs.  · Have a working fire alarm and carbon monoxide detector;   · Engage in regular physical activity and social activities

## 2018-11-29 ENCOUNTER — TELEPHONE (OUTPATIENT)
Dept: VASCULAR LAB | Facility: MEDICAL CENTER | Age: 83
End: 2018-11-29

## 2018-11-29 DIAGNOSIS — I70.0 ATHEROSCLEROSIS OF ABDOMINAL AORTA (HCC): ICD-10-CM

## 2019-01-02 NOTE — PROGRESS NOTES
Donato Burciaga was an emergent admission for near syncope and dizziness when standing. He had been experiencing an increase in fatigue and shortness of breath. Patient was admitted on 2/19 and discharged on 2/22. Patient advocate was able to speak with the patient on several occasions to confirm that he was following discharge instructions and taking his medications/medical equipment. I confirmed appointments were kept with his Primary Care Fabiano Burk, [facility], the discharge clinic Lyndsay Fink, and Specialist Oscar Del Valle.  Patient has follow ups scheduled for PCP and Cardiology. Patient Advocate was able to coordinate with [facility] to get a start of care sooner due to patient's continuing symptoms. Patient advocate was also able to assist the patient with obtaining advise in regards to his walker. PPS 80   No

## 2019-02-05 ENCOUNTER — TELEPHONE (OUTPATIENT)
Dept: VASCULAR LAB | Facility: MEDICAL CENTER | Age: 84
End: 2019-02-05

## 2019-02-05 NOTE — TELEPHONE ENCOUNTER
Called pt to remind that the  Aorta/iliac duplex  is due for surveillance. Transferred to scheduling office.  TERRI Beltran.

## 2019-02-12 ENCOUNTER — HOSPITAL ENCOUNTER (OUTPATIENT)
Dept: RADIOLOGY | Facility: MEDICAL CENTER | Age: 84
End: 2019-02-12
Attending: NURSE PRACTITIONER
Payer: MEDICARE

## 2019-02-12 DIAGNOSIS — I70.0 ATHEROSCLEROSIS OF ABDOMINAL AORTA (HCC): ICD-10-CM

## 2019-02-12 PROCEDURE — 93978 VASCULAR STUDY: CPT

## 2019-02-13 ENCOUNTER — TELEPHONE (OUTPATIENT)
Dept: VASCULAR LAB | Facility: MEDICAL CENTER | Age: 84
End: 2019-02-13

## 2019-02-14 NOTE — TELEPHONE ENCOUNTER
Aortoiliac duplex reviewed.  Max diameter AAA 3 cm  We will discuss with patient at follow up visit, but at this point I don't think he needs routine surveillance - maybe repeat in 3-5 yrs.    Michael Bloch, MD  Vascular Care

## 2019-03-11 ENCOUNTER — OFFICE VISIT (OUTPATIENT)
Dept: CARDIOLOGY | Facility: MEDICAL CENTER | Age: 84
End: 2019-03-11
Payer: MEDICARE

## 2019-03-11 VITALS
HEART RATE: 86 BPM | SYSTOLIC BLOOD PRESSURE: 120 MMHG | HEIGHT: 68 IN | DIASTOLIC BLOOD PRESSURE: 50 MMHG | WEIGHT: 142 LBS | OXYGEN SATURATION: 94 % | BODY MASS INDEX: 21.52 KG/M2

## 2019-03-11 DIAGNOSIS — I25.10 CORONARY ARTERY DISEASE INVOLVING NATIVE CORONARY ARTERY OF NATIVE HEART WITHOUT ANGINA PECTORIS: ICD-10-CM

## 2019-03-11 DIAGNOSIS — Z91.81 RISK FOR FALLS: ICD-10-CM

## 2019-03-11 DIAGNOSIS — I10 ESSENTIAL HYPERTENSION: ICD-10-CM

## 2019-03-11 DIAGNOSIS — R62.7 FAILURE TO THRIVE IN ADULT: ICD-10-CM

## 2019-03-11 PROCEDURE — 99214 OFFICE O/P EST MOD 30 MIN: CPT | Performed by: INTERNAL MEDICINE

## 2019-03-11 RX ORDER — ATORVASTATIN CALCIUM 10 MG/1
TABLET, FILM COATED ORAL
COMMUNITY
Start: 2018-12-26 | End: 2019-07-03 | Stop reason: SDUPTHER

## 2019-03-11 NOTE — PROGRESS NOTES
"Chief Complaint   Patient presents with   • Coronary Artery Disease     Follow up       Subjective:   Donato Burciaga is an 89 y.o. male who presents today for follow-up of coronary artery disease.  Has a history of hyperlipidemia, hypertension and a non-ST elevation myocardial infarction with a LAD culprit lesion that received PTCA without the ability to place a stent due to heavy calcification.      Since last visit he has stabilized with regard to weight loss and indicates he has no new symptoms.  He is using a walker.  His wife who is 94 years of age will be turning 95 soon continues to do all the cooking and cleaning as he does not have the ability to help with this.  She is very interested in assisted living but has no Internet access and requires help lining something up.    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   • Cataract    • Dental disorder     dentures- full set   • High cholesterol    • History of chemotherapy last dose 9/2016   • History of radiation therapy last done on 09/28/2016   • Hyperlipidemia, mixed 1990   • Hypertension    • MI (myocardial infarction) (HCC)    • Snoring      Past Surgical History:   Procedure Laterality Date   • ERCP IN OR N/A 8/3/2018    Procedure: ERCP IN OR;  Surgeon: Amanuel Escamilla M.D.;  Location: SURGERY SAME DAY [facility];  Service: Gastroenterology   • ERCP IN OR  4/19/2018    Procedure: ERCP IN OR W/METAL STENT PLACEMENT;  Surgeon: Everett Blackwell M.D.;  Location: SURGERY [facility];  Service: Gastroenterology   • CARDIAC CATH  02/21/2018    PTCA to 98% LAD, dilated to 40%, no stent.   • ERCP W/REM FB AND/OR STENT CHG N/A 12/5/2016    Procedure: ERCP W/REM FB AND/OR STENT CHG - PYLORIC STENT REMOVAL SWAP METAL STENT PLACEMENT;  Surgeon: Sunny Grove M.D.;  Location: SURGERY [facility];  Service:    • GASTROSCOPY-ENDO N/A " 7/11/2016    Procedure: GASTROSCOPY-ENDO;  Surgeon: Sunny Grove M.D.;  Location: [facility];  Service:    • EGD W/ENDOSCOPIC ULTRASOUND N/A 7/11/2016    Procedure: EGD W/ENDOSCOPIC ULTRASOUND - UPPER LINEAR;  Surgeon: Sunny Grove M.D.;  Location: [facility];  Service:    • ERCP W/REM FB AND/OR STENT CHG N/A 7/11/2016    Procedure: ERCP W/REM FB AND/OR STENT CHG - STENT EXCHANGE;  Surgeon: Sunny Grove M.D.;  Location: [facility];  Service:    • DIAMANTE BY LAPAROSCOPY  2/10/2016    Procedure: DIAMANTE BY LAPAROSCOPY;  Surgeon: Elver Turcios M.D.;  Location: [facility];  Service:    • ERCP IN OR N/A 2/8/2016    Procedure: ERCP IN OR;  Surgeon: Sunny Grove M.D.;  Location: [facility];  Service:      Family History   Problem Relation Age of Onset   • No Known Problems Mother    • No Known Problems Father      Social History     Social History   • Marital status:      Spouse name: N/A   • Number of children: N/A   • Years of education: N/A     Occupational History   • Not on file.     Social History Main Topics   • Smoking status: Former Smoker     Packs/day: 1.00     Years: 16.00     Types: Cigarettes     Quit date: 1962   • Smokeless tobacco: Never Used      Comment: started at 17   • Alcohol use No   • Drug use: No   • Sexual activity: No     Other Topics Concern   • Not on file     Social History Narrative   • No narrative on file     No Known Allergies  Outpatient Encounter Prescriptions as of 3/11/2019   Medication Sig Dispense Refill   • metoprolol SR (TOPROL XL) 25 MG TABLET SR 24 HR Take 1 Tab by mouth every day. 90 Tab 3   • losartan (COZAAR) 50 MG Tab Take 1 Tab by mouth every day. 90 Tab 3   • atorvastatin (LIPITOR) 10 MG Tab Take 1 Tab by mouth every evening. 90 Tab 3   • potassium chloride SA (KDUR) 20 MEQ Tab CR Take 1 Tab by mouth every day. 90 Tab 3   • megestrol (MEGACE) 20 MG Tab Take 1 Tab by mouth every day. 90 Tab 3   •  "terazosin (HYTRIN) 5 MG Cap Take 1 Cap by mouth every day. 90 Cap 3   • aspirin EC 81 MG EC tablet Take 1 Tab by mouth every day. (Patient taking differently: Take 81 mg by mouth every evening.) 30 Tab 3   • Vitamin D, Cholecalciferol, 1000 UNITS Cap Take 2,000 Units by mouth every day at 6 PM.     • atorvastatin (LIPITOR) 10 MG Tab        No facility-administered encounter medications on file as of 3/11/2019.      Review of Systems   All other systems reviewed and are negative.       Objective:   /50 (BP Location: Right arm, Patient Position: Sitting, BP Cuff Size: Adult)   Pulse 86   Ht 1.727 m (5' 8\")   Wt 64.4 kg (142 lb)   SpO2 94%   BMI 21.59 kg/m²     Physical Exam   Constitutional: He is oriented to person, place, and time. He appears well-developed and well-nourished. No distress.   Elderly and frail  Very slow get up and go  Appears much more thin than prior   HENT:   Head: Normocephalic and atraumatic.   Right Ear: External ear normal.   Left Ear: External ear normal.   Eyes: Pupils are equal, round, and reactive to light. Conjunctivae and EOM are normal. Right eye exhibits no discharge. Left eye exhibits no discharge. No scleral icterus.   Neck: Normal range of motion. Neck supple. No JVD present. No tracheal deviation present. No thyromegaly present.   Cardiovascular: Normal rate, regular rhythm and intact distal pulses.  PMI is not displaced.  Exam reveals no gallop and no friction rub.    Murmur ( 3/6 systolic ejection murmur right upper sternal border) heard.  Pulses:       Carotid pulses are 2+ on the right side, and 2+ on the left side.       Radial pulses are 2+ on the left side.        Popliteal pulses are 2+ on the right side, and 2+ on the left side.        Dorsalis pedis pulses are 2+ on the right side, and 2+ on the left side.        Posterior tibial pulses are 2+ on the right side, and 2+ on the left side.   Pulmonary/Chest: Effort normal and breath sounds normal. No respiratory " distress. He has no wheezes. He has no rales. He exhibits no tenderness.   Abdominal: Soft. Bowel sounds are normal. He exhibits no distension. There is no tenderness.   Musculoskeletal: Normal range of motion. He exhibits no edema, tenderness or deformity.   Neurological: He is alert and oriented to person, place, and time. No cranial nerve deficit (cranial nerves II through XII grossly intact). Coordination normal.   Left arm tremor   Skin: Skin is warm and dry. No rash noted. He is not diaphoretic. No erythema. No pallor.   Psychiatric: He has a normal mood and affect. His behavior is normal. Thought content normal.   Vitals reviewed.  Imaging reviewed and discussed as above with the patient.  LABS:  Lab Results   Component Value Date/Time    CHOLSTRLTOT 147 11/01/2017 09:30 AM    LDL 76 11/01/2017 09:30 AM    HDL 58 11/01/2017 09:30 AM    TRIGLYCERIDE 66 11/01/2017 09:30 AM       Lab Results   Component Value Date/Time    WBC 8.1 08/01/2018 02:45 AM    RBC 3.41 (L) 08/01/2018 02:45 AM    HEMOGLOBIN 10.4 (L) 08/01/2018 02:45 AM    HEMATOCRIT 31.6 (L) 08/01/2018 02:45 AM    MCV 92.7 08/01/2018 02:45 AM    NEUTSPOLYS 83.60 (H) 08/01/2018 02:45 AM    LYMPHOCYTES 6.50 (L) 08/01/2018 02:45 AM    MONOCYTES 7.10 08/01/2018 02:45 AM    EOSINOPHILS 2.20 08/01/2018 02:45 AM    BASOPHILS 0.20 08/01/2018 02:45 AM     Lab Results   Component Value Date/Time    SODIUM 140 08/28/2018 02:31 PM    POTASSIUM 3.9 08/28/2018 02:31 PM    CHLORIDE 107 08/28/2018 02:31 PM    CO2 23 08/28/2018 02:31 PM    GLUCOSE 89 08/28/2018 02:31 PM    BUN 21 08/28/2018 02:31 PM    CREATININE 0.80 08/28/2018 02:31 PM    BUNCREATRAT 25 (H) 11/01/2017 09:30 AM     Lab Results   Component Value Date    HBA1C 5.5 11/01/2017      Lab Results   Component Value Date/Time    ALKPHOSPHAT 141 (H) 08/28/2018 02:31 PM    ASTSGOT 26 08/28/2018 02:31 PM    ALTSGPT 35 08/28/2018 02:31 PM    TBILIRUBIN 0.6 08/28/2018 02:31 PM      Lab Results   Component Value  Date/Time    BNPBTYPENAT 62 07/30/2018 08:58 PM      No results found for: TSH  Lab Results   Component Value Date/Time    PROTHROMBTM 15.0 (H) 07/31/2018 01:05 AM    INR 1.21 (H) 07/31/2018 01:05 AM          Assessment:     1. Essential hypertension     2. Coronary artery disease involving native coronary artery of native heart without angina pectoris     3. Risk for falls     4. Failure to thrive in adult         Medical Decision Making:  Today's Assessment / Status / Plan:       No cardiac symptoms.  Increasing frailty.  Remains DNR/DNI.  They are interested in pursuing assisted living.  Recommend a social work consultation as they do not have Internet access or family in town to help them and do not drive.  Recommend he continue his other medical therapy as currently prescribed and take a daily walk.  Using his gait appliance.    FU6M

## 2019-03-13 ENCOUNTER — TELEPHONE (OUTPATIENT)
Dept: MEDICAL GROUP | Facility: MEDICAL CENTER | Age: 84
End: 2019-03-13

## 2019-03-13 RX ORDER — LISINOPRIL 20 MG/1
20 TABLET ORAL DAILY
Qty: 90 TAB | Refills: 3 | Status: SHIPPED | OUTPATIENT
Start: 2019-03-13

## 2019-03-13 NOTE — TELEPHONE ENCOUNTER
VOICEMAIL  1. Caller Name: June-Wife                      Call Back Number: [phone]    2. Message: Patient's wife called and they received a letter that patient's losartan was recalled. They are requesting a new medication.    3. Patient approves office to leave a detailed voicemail/MyChart message: N\A

## 2019-03-15 ENCOUNTER — PATIENT OUTREACH (OUTPATIENT)
Dept: HEALTH INFORMATION MANAGEMENT | Facility: OTHER | Age: 84
End: 2019-03-15

## 2019-03-15 NOTE — PROGRESS NOTES
Pt referred to Community Care Management-Social Work Care Coordination by Dr. Jimenez, [facility] with report of needing services for assistance arranging assisted living. Outreach call to pt to introduce self, discuss role of SW CC and pt's identified needs. Pt's wife, June answered and reported pt was still in bed. Discussed better time for MSW to call back to get pt's consent to discuss referral and medical information with wife. Spouse reported Monday afternoon would be best.     Plan:  · MSW will plan to reach out to pt on Monday 03/18/2019 afternoon.

## 2019-03-18 ENCOUNTER — PATIENT OUTREACH (OUTPATIENT)
Dept: HEALTH INFORMATION MANAGEMENT | Facility: OTHER | Age: 84
End: 2019-03-18

## 2019-03-18 NOTE — PROGRESS NOTES
Situation:  Pt referred to [facility]-Social Work Care Coordination by Dr. Jimenez, [facility] with report of needing services for assistance arranging assisted living. 2nd outreach call to pt to introduce self, discuss role of SW CC and pt's identified needs. Pt's wife, Chasity answered and was able to provide the phone to pt. MSW discussed with pt the above and reason for the referral including going over potential community resources. Pt requested MSW discuss with is wife, Chasity and gave verbal consent for MSW to review medical information as pertaining to social needs. Assessments initiated with spouse (see hyperlinks.)     Background:  Pt is 89 year old male- who lives with his 94 year old wife--who is his primary care giver. Pt and spouse have been  for 66 years.  Spouse reported they own their home, no mortgage which is a 3 bedroom, 2 bath home with no stairs. She reported there is a step in the home through the front door and garage and a step into the sun room. They also have a 12 year old Shitz-riojas dog. She reported they have one daughter, Arabella who lives in [place] and is a good support system. She reported she would come to assist if needed but she doesn't tend to ask due to not wanting to inconvenient her daughter.     Spouse reported that they currently have care providers with [facility] coming in on Mondays 10 am to 1 pm; Wednesdays 10 am to 1 pm; and Thursdays 10 am to 1 pm. She reported the care provider with [facility] will assist with driving them to the grocery store and running errands as neither one of them drive. She reported they also pay privately to have a CNA who comes over twice a week, typically on Mondays and Thursdays to assist the patient with bathing. Spouse reported he is fairly independent with his other ADL needs requiring minimal assistance. She reported he if full dependence or needs assistance with his iADLs such as shopping, cleaning, cooking,  "medication management, finances, transportation, etc. She reported he will typically dry the dishes and goes shopping with her and the care provider because she doesn't feel safe leaving pt home alone.     Spouse denies any financial barriers, barriers to obtaining basic needs such as food, medications/health care, etc. She reported she is currently interested in them moving into an assisted living facility.     Assessment:  Wife explained she would ideally like to move into a facility that has both independent and assisted where she can move into the independent side and spouse/pt able to move into the assisted side. She reported he is very resistive to the idea and declined any flexibility in considering to move. Spouse reported \"I'm just going to have to do it. He's just not going to get to say no.\" She reported she has already told him her plans to start clearing out the home to get it ready for sell. Reviewed assisted and independent living options with wife focusing on facilities in [facility] that provide both options. Discussed funding sources for long-term care options, including possible VA funding and steps associated with admitting into these types of facilities. Spouse voiced understanding and currently requesting a resource list of local facilities to help with her search. MSW will mail out resource list of facilities as well as [facility] to look into further options pt may have available to him being a .     Plan:   Care plan initiated.   Resources mailed to family.   "

## 2019-03-26 RX ORDER — LOSARTAN POTASSIUM 50 MG/1
TABLET ORAL
Qty: 90 TAB | Refills: 0 | Status: SHIPPED | OUTPATIENT
Start: 2019-03-26 | End: 2019-05-13

## 2019-03-30 DIAGNOSIS — R62.7 ADULT FAILURE TO THRIVE: ICD-10-CM

## 2019-03-30 RX ORDER — MEGESTROL ACETATE 20 MG/1
TABLET ORAL
Qty: 90 TAB | Refills: 3 | Status: SHIPPED | OUTPATIENT
Start: 2019-03-30 | End: 2019-09-12 | Stop reason: SDUPTHER

## 2019-04-03 ENCOUNTER — PATIENT OUTREACH (OUTPATIENT)
Dept: HEALTH INFORMATION MANAGEMENT | Facility: OTHER | Age: 84
End: 2019-04-03

## 2019-04-03 NOTE — PROGRESS NOTES
"Outreach call to pt and spouse to follow up on  care plan/resources mailed related to assisted living options. Spouse, Chasity answered and confirmed she received all information/resources. She reported she has reviewed over information and found it to be helpful. She indicated she has also reached out to a few facilities to get estimates/cost of care.      Wife explained it will be awhile before they are actively moving in this direction discussing the time she feels it will take to down-size their belongs and home. She reported she is still not sure what she wants to do with their items as she does not what to have a garage sale or estate sell which would bring other people over to the home. MSW discussed possibilities of reaching out to an action house or an  to determine if there are other options available vs having \"strangers in the home\" to sell items. Spouse reported she like the idea of possibly using an auction house and indicated she would look more into this services to determine if it will meet their needs.     MSW discussed if there were additional social need resources or assistance she was needing at this time. Spouse declined reporting currently she will work towards down-sizing their home at their pace and follow up with information/resources sent when able. Discussed if spouse/pt would like MSW to follow up in a few weeks to determine if there are any changes or additional needs. Spouse declined at this time reporting she felt confident in following up with information independently and reaching back out to MSW if needed.     Plan:  · At this time MSW will graduate pt/spouse from [facility] services, goal met.   "

## 2019-05-13 ENCOUNTER — OFFICE VISIT (OUTPATIENT)
Dept: MEDICAL GROUP | Facility: MEDICAL CENTER | Age: 84
End: 2019-05-13
Payer: MEDICARE

## 2019-05-13 VITALS
SYSTOLIC BLOOD PRESSURE: 134 MMHG | HEIGHT: 68 IN | BODY MASS INDEX: 20.46 KG/M2 | OXYGEN SATURATION: 96 % | DIASTOLIC BLOOD PRESSURE: 60 MMHG | WEIGHT: 135 LBS | HEART RATE: 91 BPM | TEMPERATURE: 98.5 F

## 2019-05-13 DIAGNOSIS — E78.5 DYSLIPIDEMIA: ICD-10-CM

## 2019-05-13 DIAGNOSIS — I10 ESSENTIAL HYPERTENSION: ICD-10-CM

## 2019-05-13 DIAGNOSIS — R73.02 IGT (IMPAIRED GLUCOSE TOLERANCE): ICD-10-CM

## 2019-05-13 PROCEDURE — 99214 OFFICE O/P EST MOD 30 MIN: CPT | Performed by: INTERNAL MEDICINE

## 2019-05-13 ASSESSMENT — PATIENT HEALTH QUESTIONNAIRE - PHQ9: CLINICAL INTERPRETATION OF PHQ2 SCORE: 0

## 2019-05-13 NOTE — PROGRESS NOTES
CC: Follow-up blood sugars, hypertension, dyslipidemia.    HPI:   Donato presents today with the following.    1. IGT (impaired glucose tolerance)  Presents with previous elevations of blood sugar weight persistently going down although relatively stable over the last 6 months.  Unlikely to be diabetic but again will be coming due for blood work    2. Essential hypertension  Pressure on upper end of normal maintain on current therapy without dizziness.  He is followed by cardiology with no further changes to medication.    3. Dyslipidemia  Cholesterol coming due for check as well maintain on statin without myalgia      Patient Active Problem List    Diagnosis Date Noted   • Near syncope 02/20/2018     Priority: High   • Hypokalemia 04/18/2018     Priority: Medium   • Coronary artery disease involving native coronary artery of native heart without angina pectoris 03/07/2018     Priority: Medium   • LFTs abnormal 12/06/2017     Priority: Medium   • Essential hypertension 01/26/2015     Priority: Medium   • History of gallbladder cancer 11/09/2017     Priority: Low   • Anemia 02/09/2016     Priority: Low   • Dyslipidemia 01/26/2015     Priority: Low   • Atherosclerosis of abdominal aorta (HCC) 11/29/2018   • Risk for falls 11/13/2018   • Failure to thrive in adult 09/06/2018   • Normocytic anemia 07/31/2018   • Appetite loss 03/07/2018   • History of non-ST elevation myocardial infarction (NSTEMI) 02/21/2018   • Benign essential tremor 11/03/2016   • IGT (impaired glucose tolerance) 11/30/2015   • Benign non-nodular prostatic hyperplasia with lower urinary tract symptoms 08/03/2015       Current Outpatient Prescriptions   Medication Sig Dispense Refill   • megestrol (MEGACE) 20 MG Tab TAKE 1 TABLET DAILY 90 Tab 3   • lisinopril (PRINIVIL) 20 MG Tab Take 1 Tab by mouth every day. 90 Tab 3   • atorvastatin (LIPITOR) 10 MG Tab      • metoprolol SR (TOPROL XL) 25 MG TABLET SR 24 HR Take 1 Tab by mouth every day. 90 Tab 3  "  • atorvastatin (LIPITOR) 10 MG Tab Take 1 Tab by mouth every evening. 90 Tab 3   • potassium chloride SA (KDUR) 20 MEQ Tab CR Take 1 Tab by mouth every day. 90 Tab 3   • terazosin (HYTRIN) 5 MG Cap Take 1 Cap by mouth every day. 90 Cap 3   • aspirin EC 81 MG EC tablet Take 1 Tab by mouth every day. (Patient taking differently: Take 81 mg by mouth every evening.) 30 Tab 3   • Vitamin D, Cholecalciferol, 1000 UNITS Cap Take 2,000 Units by mouth every day at 6 PM.       No current facility-administered medications for this visit.          Allergies as of 05/13/2019   • (No Known Allergies)        ROS: Denies Chest pain, SOB, LE edema.    /60 (BP Location: Left arm, Patient Position: Sitting)   Pulse 91   Temp 36.9 °C (98.5 °F)   Ht 1.727 m (5' 8\")   Wt 61.2 kg (135 lb)   SpO2 96%   BMI 20.53 kg/m²     Physical Exam:  Gen:         Alert and oriented, No apparent distress.  Neck:        No Lymphadenopathy or Bruits.  Lungs:     Clear to auscultation bilaterally  CV:          Regular rate and rhythm. No murmurs, rubs or gallops.               Ext:          No clubbing, cyanosis, edema.      Assessment and Plan.   89 y.o. male with the following issues.    1. IGT (impaired glucose tolerance)  Recheck A1c no specific dietary restrictions given his inability to gain weight.  - HEMOGLOBIN A1C; Future    2. Essential hypertension  Currently well controlled, Discuss diet, exercise and salt restriction.  No change to medication therapy.    3. Dyslipidemia  Lipids currently well controlled. Discussed continued diet and exercise recheck 6 months to 1 year.  - Comp Metabolic Panel; Future  - Lipid Profile; Future      "

## 2019-05-20 ENCOUNTER — HOSPITAL ENCOUNTER (OUTPATIENT)
Dept: LAB | Facility: MEDICAL CENTER | Age: 84
End: 2019-05-20
Attending: INTERNAL MEDICINE
Payer: MEDICARE

## 2019-05-20 DIAGNOSIS — E78.5 DYSLIPIDEMIA: ICD-10-CM

## 2019-05-20 DIAGNOSIS — R73.02 IGT (IMPAIRED GLUCOSE TOLERANCE): ICD-10-CM

## 2019-05-20 PROCEDURE — 36415 COLL VENOUS BLD VENIPUNCTURE: CPT

## 2019-05-20 PROCEDURE — 80061 LIPID PANEL: CPT

## 2019-05-20 PROCEDURE — 83036 HEMOGLOBIN GLYCOSYLATED A1C: CPT | Mod: GA

## 2019-05-20 PROCEDURE — 80053 COMPREHEN METABOLIC PANEL: CPT

## 2019-05-21 LAB
ALBUMIN SERPL BCP-MCNC: 3.7 G/DL (ref 3.2–4.9)
ALBUMIN/GLOB SERPL: 1.6 G/DL
ALP SERPL-CCNC: 59 U/L (ref 30–99)
ALT SERPL-CCNC: 13 U/L (ref 2–50)
ANION GAP SERPL CALC-SCNC: 6 MMOL/L (ref 0–11.9)
AST SERPL-CCNC: 18 U/L (ref 12–45)
BILIRUB SERPL-MCNC: 0.8 MG/DL (ref 0.1–1.5)
BUN SERPL-MCNC: 23 MG/DL (ref 8–22)
CALCIUM SERPL-MCNC: 8.9 MG/DL (ref 8.5–10.5)
CHLORIDE SERPL-SCNC: 109 MMOL/L (ref 96–112)
CHOLEST SERPL-MCNC: 102 MG/DL (ref 100–199)
CO2 SERPL-SCNC: 24 MMOL/L (ref 20–33)
CREAT SERPL-MCNC: 0.96 MG/DL (ref 0.5–1.4)
EST. AVERAGE GLUCOSE BLD GHB EST-MCNC: 123 MG/DL
FASTING STATUS PATIENT QL REPORTED: NORMAL
GLOBULIN SER CALC-MCNC: 2.3 G/DL (ref 1.9–3.5)
GLUCOSE SERPL-MCNC: 93 MG/DL (ref 65–99)
HBA1C MFR BLD: 5.9 % (ref 0–5.6)
HDLC SERPL-MCNC: 43 MG/DL
LDLC SERPL CALC-MCNC: 47 MG/DL
POTASSIUM SERPL-SCNC: 4.5 MMOL/L (ref 3.6–5.5)
PROT SERPL-MCNC: 6 G/DL (ref 6–8.2)
SODIUM SERPL-SCNC: 139 MMOL/L (ref 135–145)
TRIGL SERPL-MCNC: 59 MG/DL (ref 0–149)

## 2019-06-07 DIAGNOSIS — N40.0 HYPERTROPHY OF PROSTATE WITHOUT URINARY OBSTRUCTION: ICD-10-CM

## 2019-06-07 DIAGNOSIS — I25.10 CORONARY ARTERY DISEASE INVOLVING NATIVE CORONARY ARTERY OF NATIVE HEART WITHOUT ANGINA PECTORIS: ICD-10-CM

## 2019-06-07 RX ORDER — TERAZOSIN 5 MG/1
CAPSULE ORAL
Qty: 90 CAP | Refills: 3 | Status: SHIPPED | OUTPATIENT
Start: 2019-06-07 | End: 2019-09-12

## 2019-06-07 RX ORDER — METOPROLOL SUCCINATE 25 MG/1
TABLET, EXTENDED RELEASE ORAL
Qty: 90 TAB | Refills: 2 | Status: SHIPPED | OUTPATIENT
Start: 2019-06-07 | End: 2019-09-12

## 2019-06-13 ENCOUNTER — TELEPHONE (OUTPATIENT)
Dept: MEDICAL GROUP | Facility: MEDICAL CENTER | Age: 84
End: 2019-06-13

## 2019-06-13 RX ORDER — POTASSIUM CHLORIDE 20 MEQ/1
20 TABLET, EXTENDED RELEASE ORAL DAILY
Qty: 90 TAB | Refills: 3 | Status: SHIPPED
Start: 2019-06-13 | End: 2019-09-10

## 2019-06-13 NOTE — TELEPHONE ENCOUNTER
1. Caller Name: [facility]                                         Call Back Number: [phone]        Patient approves a detailed voicemail message: yes     [facility] sent a fax asking for Klor-Con order to be cuts in 1/4s and ok to be dissolved PRN for easy swallowing.     Please advise

## 2019-07-05 RX ORDER — ATORVASTATIN CALCIUM 10 MG/1
10 TABLET, FILM COATED ORAL EVERY EVENING
Qty: 90 TAB | Refills: 3 | Status: SHIPPED | OUTPATIENT
Start: 2019-07-05 | End: 2019-09-12

## 2019-07-05 RX ORDER — ATORVASTATIN CALCIUM 10 MG/1
TABLET, FILM COATED ORAL
Qty: 90 TAB | Refills: 3 | Status: SHIPPED | OUTPATIENT
Start: 2019-07-05 | End: 2019-10-10 | Stop reason: SDUPTHER

## 2019-09-12 ENCOUNTER — OFFICE VISIT (OUTPATIENT)
Dept: MEDICAL GROUP | Facility: MEDICAL CENTER | Age: 84
End: 2019-09-12
Payer: MEDICARE

## 2019-09-12 VITALS
SYSTOLIC BLOOD PRESSURE: 120 MMHG | TEMPERATURE: 97.7 F | WEIGHT: 125 LBS | BODY MASS INDEX: 18.94 KG/M2 | OXYGEN SATURATION: 95 % | HEIGHT: 68 IN | DIASTOLIC BLOOD PRESSURE: 62 MMHG | HEART RATE: 101 BPM

## 2019-09-12 DIAGNOSIS — E78.5 DYSLIPIDEMIA: ICD-10-CM

## 2019-09-12 DIAGNOSIS — Z23 NEED FOR VACCINATION: ICD-10-CM

## 2019-09-12 DIAGNOSIS — I10 ESSENTIAL HYPERTENSION: ICD-10-CM

## 2019-09-12 DIAGNOSIS — R63.4 WEIGHT LOSS: ICD-10-CM

## 2019-09-12 PROCEDURE — 90662 IIV NO PRSV INCREASED AG IM: CPT | Performed by: INTERNAL MEDICINE

## 2019-09-12 PROCEDURE — G0008 ADMIN INFLUENZA VIRUS VAC: HCPCS | Performed by: INTERNAL MEDICINE

## 2019-09-12 PROCEDURE — 99214 OFFICE O/P EST MOD 30 MIN: CPT | Mod: 25 | Performed by: INTERNAL MEDICINE

## 2019-09-12 RX ORDER — MEGESTROL ACETATE 20 MG/1
20 TABLET ORAL DAILY
Qty: 90 TAB | Refills: 3 | Status: SHIPPED | OUTPATIENT
Start: 2019-09-12 | End: 2019-09-12 | Stop reason: SDUPTHER

## 2019-09-12 RX ORDER — AMLODIPINE BESYLATE 5 MG/1
5 TABLET ORAL DAILY
COMMUNITY

## 2019-09-12 RX ORDER — MEGESTROL ACETATE 20 MG/1
20 TABLET ORAL DAILY
Qty: 90 TAB | Refills: 3 | Status: SHIPPED | OUTPATIENT
Start: 2019-09-12

## 2019-09-12 RX ORDER — FLUTICASONE PROPIONATE 0.05 %
CREAM (GRAM) TOPICAL 2 TIMES DAILY
COMMUNITY

## 2019-09-12 RX ORDER — M-VIT,TX,IRON,MINS/CALC/FOLIC 27MG-0.4MG
1 TABLET ORAL DAILY
COMMUNITY

## 2019-09-12 NOTE — PROGRESS NOTES
CC: Weight loss, hypertension, dyslipidemia                                                                                                                                      HPI:   Donato presents today with the following.    1. Weight loss  Maintained on medications in the past to help with appetite they have been since discontinued since he has been in the care facility.  Wife reports he does not go down to eat regularly.  Is quite unhappy in his current living situation but they cannot care for himself at home.    2. Need for vaccination      3. Dyslipidemia  Maintain on his statin he is denying any myalgias likely of little benefit at this point however he would like to continue to take.    4. Essential hypertension  Pressure has gone down with his weight loss he is now off beta-blockade he does follow-up with cardiology.  He is denying chest pains or palpitation maintain on lisinopril and amlodipine.      Patient Active Problem List    Diagnosis Date Noted   • Near syncope 02/20/2018     Priority: High   • Hypokalemia 04/18/2018     Priority: Medium   • Coronary artery disease involving native coronary artery of native heart without angina pectoris 03/07/2018     Priority: Medium   • LFTs abnormal 12/06/2017     Priority: Medium   • Essential hypertension 01/26/2015     Priority: Medium   • History of gallbladder cancer 11/09/2017     Priority: Low   • Anemia 02/09/2016     Priority: Low   • Dyslipidemia 01/26/2015     Priority: Low   • Atherosclerosis of abdominal aorta (HCC) 11/29/2018   • Risk for falls 11/13/2018   • Failure to thrive in adult 09/06/2018   • Normocytic anemia 07/31/2018   • Appetite loss 03/07/2018   • History of non-ST elevation myocardial infarction (NSTEMI) 02/21/2018   • Benign essential tremor 11/03/2016   • IGT (impaired glucose tolerance) 11/30/2015   • Benign non-nodular prostatic hyperplasia with lower urinary tract symptoms 08/03/2015       Current Outpatient Medications  "  Medication Sig Dispense Refill   • fluticasone (CUTIVATE) 0.05 % cream Apply  to affected area(s) 2 times a day.     • amLODIPine (NORVASC) 5 MG Tab Take 5 mg by mouth every day.     • Calcium-Magnesium-Vitamin D (CALCIUM 500 PO) Take  by mouth.     • therapeutic multivitamin-minerals (THERAGRAN-M) Tab Take 1 Tab by mouth every day.     • megestrol (MEGACE) 20 MG Tab Take 1 Tab by mouth every day. 90 Tab 3   • atorvastatin (LIPITOR) 10 MG Tab TAKE 1 TABLET EVERY EVENING 90 Tab 3   • lisinopril (PRINIVIL) 20 MG Tab Take 1 Tab by mouth every day. 90 Tab 3   • Vitamin D, Cholecalciferol, 1000 UNITS Cap Take 2,000 Units by mouth every day at 6 PM.       No current facility-administered medications for this visit.          Allergies as of 09/12/2019   • (No Known Allergies)        ROS: Denies Chest pain, SOB, LE edema.    /62 (BP Location: Left arm, Patient Position: Sitting)   Pulse (!) 101   Temp 36.5 °C (97.7 °F)   Ht 1.727 m (5' 8\")   Wt 56.7 kg (125 lb)   SpO2 95%   BMI 19.01 kg/m²     Physical Exam:  Gen:         Alert and oriented, No apparent distress.  Neck:        No Lymphadenopathy or Bruits.  Lungs:     Clear to auscultation bilaterally  CV:          Regular rate and rhythm. No murmurs, rubs or gallops.               Ext:          No clubbing, cyanosis, edema.      Assessment and Plan.   89 y.o. male with the following issues.    1. Weight loss  Starting back on Megace to try and help with appetite and weight gain.  Cautioned about side  - megestrol (MEGACE) 20 MG Tab; Take 1 Tab by mouth every day.  Dispense: 90 Tab; Refill: 3    2. Need for vaccination    - INFLUENZA VACCINE, HIGH DOSE (65+ ONLY)    3. Dyslipidemia  Continue statin however in the future if they wish to discontinue certainly reasonable    4. Essential hypertension  Changes to medication at this time may consider discontinuing other meds if he starts to become dizzy.      "

## 2019-09-17 ENCOUNTER — OFFICE VISIT (OUTPATIENT)
Dept: CARDIOLOGY | Facility: MEDICAL CENTER | Age: 84
End: 2019-09-17
Payer: MEDICARE

## 2019-09-17 VITALS
OXYGEN SATURATION: 95 % | HEART RATE: 96 BPM | SYSTOLIC BLOOD PRESSURE: 108 MMHG | HEIGHT: 68 IN | BODY MASS INDEX: 18.85 KG/M2 | WEIGHT: 124.4 LBS | DIASTOLIC BLOOD PRESSURE: 52 MMHG

## 2019-09-17 DIAGNOSIS — R62.7 FAILURE TO THRIVE IN ADULT: ICD-10-CM

## 2019-09-17 DIAGNOSIS — R64 CACHEXIA (HCC): ICD-10-CM

## 2019-09-17 DIAGNOSIS — Z91.81 RISK FOR FALLS: ICD-10-CM

## 2019-09-17 DIAGNOSIS — I10 ESSENTIAL HYPERTENSION: ICD-10-CM

## 2019-09-17 DIAGNOSIS — I25.10 CORONARY ARTERY DISEASE INVOLVING NATIVE CORONARY ARTERY OF NATIVE HEART WITHOUT ANGINA PECTORIS: ICD-10-CM

## 2019-09-17 DIAGNOSIS — R63.0 APPETITE LOSS: ICD-10-CM

## 2019-09-17 PROCEDURE — 99213 OFFICE O/P EST LOW 20 MIN: CPT | Performed by: INTERNAL MEDICINE

## 2019-09-17 NOTE — PROGRESS NOTES
"Chief Complaint   Patient presents with   • Coronary Artery Disease   • Hypertension       Subjective:   Donato Burciaga is an 89y.o. male who presents today for follow-up of coronary artery disease.  Has a history of hyperlipidemia, hypertension and a non-ST elevation myocardial infarction with a LAD culprit lesion that received PTCA without the ability to place a stent due to heavy calcification.      Since last visit he has had a dramatic 20 pound weight loss unintentionally and endorses no appetite and otherwise has lived moved into the assisted portion of an assisted living facility and his wife who is 95 lives in the independent section.  She is frustrated with his lack of desire to do anything or participate in his activities of daily living, and finds that he does not eat food he wants brought to him.  He is on an appetite stimulant.  This is not helping.    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   • Cataract    • Dental disorder     dentures- full set   • High cholesterol    • History of chemotherapy last dose 9/2016   • History of radiation therapy last done on 09/28/2016   • Hyperlipidemia, mixed 1990   • Hypertension    • MI (myocardial infarction) (HCC)    • Snoring      Past Surgical History:   Procedure Laterality Date   • ERCP IN OR N/A 8/3/2018    Procedure: ERCP IN OR;  Surgeon: Amanuel Escamilla M.D.;  Location: SURGERY SAME DAY [facility];  Service: Gastroenterology   • ERCP IN OR  4/19/2018    Procedure: ERCP IN OR W/METAL STENT PLACEMENT;  Surgeon: Everett Blackwell M.D.;  Location: SURGERY [facility];  Service: Gastroenterology   • ZZZ CARDIAC CATH  02/21/2018    PTCA to 98% LAD, dilated to 40%, no stent.   • ERCP W/REM FB AND/OR STENT CHG N/A 12/5/2016    Procedure: ERCP W/REM FB AND/OR STENT CHG - PYLORIC STENT REMOVAL SWAP METAL STENT PLACEMENT;  Surgeon: Sunny SINGH" JLUIS Grove;  Location: [facility];  Service:    • GASTROSCOPY-ENDO N/A 2016    Procedure: GASTROSCOPY-ENDO;  Surgeon: Sunny Grove M.D.;  Location: [facility];  Service:    • EGD W/ENDOSCOPIC ULTRASOUND N/A 2016    Procedure: EGD W/ENDOSCOPIC ULTRASOUND - UPPER LINEAR;  Surgeon: Sunny Grove M.D.;  Location: [facility];  Service:    • ERCP W/REM FB AND/OR STENT CHG N/A 2016    Procedure: ERCP W/REM FB AND/OR STENT CHG - STENT EXCHANGE;  Surgeon: Sunny Grove M.D.;  Location: [facility];  Service:    • DIAMANTE BY LAPAROSCOPY  2/10/2016    Procedure: DIAMANTE BY LAPAROSCOPY;  Surgeon: Elver Turcios M.D.;  Location: [facility];  Service:    • ERCP IN OR N/A 2016    Procedure: ERCP IN OR;  Surgeon: Sunny Grove M.D.;  Location: [facility];  Service:      Family History   Problem Relation Age of Onset   • No Known Problems Mother    • No Known Problems Father      Social History     Socioeconomic History   • Marital status:      Spouse name: Not on file   • Number of children: Not on file   • Years of education: Not on file   • Highest education level: Not on file   Occupational History   • Not on file   Social Needs   • Financial resource strain: Not on file   • Food insecurity:     Worry: Not on file     Inability: Not on file   • Transportation needs:     Medical: Not on file     Non-medical: Not on file   Tobacco Use   • Smoking status: Former Smoker     Packs/day: 1.00     Years: 16.00     Pack years: 16.00     Types: Cigarettes     Last attempt to quit:      Years since quittin.7   • Smokeless tobacco: Never Used   • Tobacco comment: started at 17   Substance and Sexual Activity   • Alcohol use: No     Alcohol/week: 0.0 oz   • Drug use: No   • Sexual activity: Never     Partners: Female   Lifestyle   • Physical activity:     Days per week: Not on file     Minutes per session: Not on file   • Stress: Not  "on file   Relationships   • Social connections:     Talks on phone: Not on file     Gets together: Not on file     Attends Buddhism service: Not on file     Active member of club or organization: Not on file     Attends meetings of clubs or organizations: Not on file     Relationship status: Not on file   • Intimate partner violence:     Fear of current or ex partner: Not on file     Emotionally abused: Not on file     Physically abused: Not on file     Forced sexual activity: Not on file   Other Topics Concern   • Not on file   Social History Narrative   • Not on file     No Known Allergies  Outpatient Encounter Medications as of 9/17/2019   Medication Sig Dispense Refill   • amLODIPine (NORVASC) 5 MG Tab Take 5 mg by mouth every day.     • megestrol (MEGACE) 20 MG Tab Take 1 Tab by mouth every day. 90 Tab 3   • atorvastatin (LIPITOR) 10 MG Tab TAKE 1 TABLET EVERY EVENING 90 Tab 3   • lisinopril (PRINIVIL) 20 MG Tab Take 1 Tab by mouth every day. 90 Tab 3   • Vitamin D, Cholecalciferol, 1000 UNITS Cap Take 2,000 Units by mouth every day at 6 PM.     • fluticasone (CUTIVATE) 0.05 % cream Apply  to affected area(s) 2 times a day.     • Calcium-Magnesium-Vitamin D (CALCIUM 500 PO) Take  by mouth.     • therapeutic multivitamin-minerals (THERAGRAN-M) Tab Take 1 Tab by mouth every day.       No facility-administered encounter medications on file as of 9/17/2019.      Review of Systems   All other systems reviewed and are negative.       Objective:   /52 (BP Location: Left arm, Patient Position: Sitting, BP Cuff Size: Adult)   Pulse 96   Ht 1.727 m (5' 8\")   Wt 56.4 kg (124 lb 6.4 oz)   SpO2 95%   BMI 18.91 kg/m²     Physical Exam   Constitutional: He is oriented to person, place, and time. He appears well-developed and well-nourished. No distress.   Elderly and frail  Very slow get up and go  Appears much more thin than prior   HENT:   Head: Normocephalic and atraumatic.   Right Ear: External ear normal. "   Left Ear: External ear normal.   Eyes: Pupils are equal, round, and reactive to light. Conjunctivae and EOM are normal. Right eye exhibits no discharge. Left eye exhibits no discharge. No scleral icterus.   Neck: Normal range of motion. Neck supple. No JVD present. No tracheal deviation present. No thyromegaly present.   Cardiovascular: Normal rate, regular rhythm and intact distal pulses. PMI is not displaced. Exam reveals no gallop and no friction rub.   Murmur ( 3/6 systolic ejection murmur right upper sternal border) heard.  Pulses:       Carotid pulses are 2+ on the right side, and 2+ on the left side.       Radial pulses are 2+ on the left side.        Popliteal pulses are 2+ on the right side, and 2+ on the left side.        Dorsalis pedis pulses are 2+ on the right side, and 2+ on the left side.        Posterior tibial pulses are 2+ on the right side, and 2+ on the left side.   Pulmonary/Chest: Effort normal and breath sounds normal. No respiratory distress. He has no wheezes. He has no rales. He exhibits no tenderness.   Abdominal: Soft. Bowel sounds are normal. He exhibits no distension. There is no tenderness.   Musculoskeletal: Normal range of motion. He exhibits no edema, tenderness or deformity.   Neurological: He is alert and oriented to person, place, and time. No cranial nerve deficit (cranial nerves II through XII grossly intact). Coordination normal.   Left arm tremor   Skin: Skin is warm and dry. No rash noted. He is not diaphoretic. No erythema. No pallor.   Psychiatric: He has a normal mood and affect. His behavior is normal. Thought content normal.   Vitals reviewed.  Imaging reviewed and discussed as above with the patient.  LABS:  Lab Results   Component Value Date/Time    CHOLSTRLTOT 102 05/20/2019 11:28 AM    LDL 47 05/20/2019 11:28 AM    HDL 43 05/20/2019 11:28 AM    TRIGLYCERIDE 59 05/20/2019 11:28 AM       Lab Results   Component Value Date/Time    WBC 8.1 08/01/2018 02:45 AM    RBC  3.41 (L) 08/01/2018 02:45 AM    HEMOGLOBIN 10.4 (L) 08/01/2018 02:45 AM    HEMATOCRIT 31.6 (L) 08/01/2018 02:45 AM    MCV 92.7 08/01/2018 02:45 AM    NEUTSPOLYS 83.60 (H) 08/01/2018 02:45 AM    LYMPHOCYTES 6.50 (L) 08/01/2018 02:45 AM    MONOCYTES 7.10 08/01/2018 02:45 AM    EOSINOPHILS 2.20 08/01/2018 02:45 AM    BASOPHILS 0.20 08/01/2018 02:45 AM     Lab Results   Component Value Date/Time    SODIUM 139 05/20/2019 11:28 AM    POTASSIUM 4.5 05/20/2019 11:28 AM    CHLORIDE 109 05/20/2019 11:28 AM    CO2 24 05/20/2019 11:28 AM    GLUCOSE 93 05/20/2019 11:28 AM    BUN 23 (H) 05/20/2019 11:28 AM    CREATININE 0.96 05/20/2019 11:28 AM    BUNCREATRAT 25 (H) 11/01/2017 09:30 AM     Lab Results   Component Value Date    HBA1C 5.9 (H) 05/20/2019      Lab Results   Component Value Date/Time    ALKPHOSPHAT 59 05/20/2019 11:28 AM    ASTSGOT 18 05/20/2019 11:28 AM    ALTSGPT 13 05/20/2019 11:28 AM    TBILIRUBIN 0.8 05/20/2019 11:28 AM      Lab Results   Component Value Date/Time    BNPBTYPENAT 62 07/30/2018 08:58 PM      No results found for: TSH  Lab Results   Component Value Date/Time    PROTHROMBTM 15.0 (H) 07/31/2018 01:05 AM    INR 1.21 (H) 07/31/2018 01:05 AM          Assessment:     1. Coronary artery disease involving native coronary artery of native heart without angina pectoris     2. Essential hypertension     3. Appetite loss     4. Risk for falls     5. Cachexia (HCC)     6. Failure to thrive in adult         Medical Decision Making:  Today's Assessment / Status / Plan:       No cardiac symptoms.  Dramatically more frail than 6 months prior at last visit.  Continues to have progressive failure to thrive.  His wife mentions hospice as a possibility for him.  She would like to entertain this idea if he does not begin improving return to corner with regard to his weight loss.  We had a remy discussion with him together today.  Recommend no medication changes at this time and follow-up with primary care for his  cachexia and failure to thrive.  I am happy to arrange hospice should they deem this appropriate.

## 2019-10-04 ENCOUNTER — TELEPHONE (OUTPATIENT)
Dept: CARDIOLOGY | Facility: MEDICAL CENTER | Age: 84
End: 2019-10-04

## 2019-10-04 NOTE — TELEPHONE ENCOUNTER
TW    Pt's wife Chasity states he is losing weight & she can't get him to eat, only drinks a glass of milk. She said he isn't looking well.   Ph# [phone] or [phone]

## 2019-10-07 ENCOUNTER — TELEPHONE (OUTPATIENT)
Dept: MEDICAL GROUP | Facility: MEDICAL CENTER | Age: 84
End: 2019-10-07

## 2019-10-07 RX ORDER — MIRTAZAPINE 7.5 MG/1
7.5 TABLET, FILM COATED ORAL
Qty: 30 TAB | Refills: 6 | Status: SHIPPED | OUTPATIENT
Start: 2019-10-07 | End: 2019-10-09 | Stop reason: SDUPTHER

## 2019-10-07 NOTE — TELEPHONE ENCOUNTER
1. Caller Name: June                      Call Back Number: [phone] (home)       2. Message: Cintia angulo asst living nurse at [facility] spoke to June about getting a new script sent in for remeron to take at night. Patient has been depressed and not eating.     3. Patient approves office to leave a detailed voicemail/MyChart message: yes

## 2019-10-08 ENCOUNTER — TELEPHONE (OUTPATIENT)
Dept: MEDICAL GROUP | Facility: MEDICAL CENTER | Age: 84
End: 2019-10-08

## 2019-10-08 NOTE — TELEPHONE ENCOUNTER
Ana Maria from [facility] received the remeron for patient. They need a signed order for the medication in order to give it to the patient. There fax is [phone]

## 2019-10-09 RX ORDER — MIRTAZAPINE 7.5 MG/1
7.5 TABLET, FILM COATED ORAL
Qty: 30 TAB | Refills: 6 | Status: SHIPPED
Start: 2019-10-09 | End: 2019-10-09 | Stop reason: SDUPTHER

## 2019-10-09 RX ORDER — MIRTAZAPINE 7.5 MG/1
7.5 TABLET, FILM COATED ORAL
Qty: 30 TAB | Refills: 6 | Status: SHIPPED
Start: 2019-10-09

## 2019-10-10 RX ORDER — ATORVASTATIN CALCIUM 10 MG/1
TABLET, FILM COATED ORAL
Qty: 90 TAB | Refills: 3 | Status: SHIPPED | OUTPATIENT
Start: 2019-10-10

## 2019-10-29 ENCOUNTER — TELEPHONE (OUTPATIENT)
Dept: MEDICAL GROUP | Facility: MEDICAL CENTER | Age: 84
End: 2019-10-29

## 2019-10-29 DIAGNOSIS — G25.0 BENIGN ESSENTIAL TREMOR: ICD-10-CM

## 2019-10-29 DIAGNOSIS — Z85.09 HISTORY OF GALLBLADDER CANCER: ICD-10-CM

## 2019-10-29 DIAGNOSIS — R64 CACHEXIA (HCC): Primary | ICD-10-CM

## 2019-10-29 NOTE — TELEPHONE ENCOUNTER
1. Caller Name: June                                         Call Back Number: [phone] or [phone]      Patient approves a detailed voicemail message: N\A    Patient's wife called and the assisted living place he is in is recommending hospice. Patient is losing a lot of weight and down to 118lb. He is not eating well, sleeping a lot and weak. She was wondering he needs to come in for an appt to be evaluated for hospice. If not, she would like a referral to [facility]. Please advise.

## 2020-01-01 ENCOUNTER — HOSPITAL ENCOUNTER (EMERGENCY)
Facility: MEDICAL CENTER | Age: 85
End: 2020-01-01
Attending: EMERGENCY MEDICINE
Payer: MEDICARE

## 2020-01-01 VITALS
SYSTOLIC BLOOD PRESSURE: 75 MMHG | HEIGHT: 68 IN | DIASTOLIC BLOOD PRESSURE: 41 MMHG | WEIGHT: 118 LBS | TEMPERATURE: 97 F | HEART RATE: 74 BPM | BODY MASS INDEX: 17.88 KG/M2 | OXYGEN SATURATION: 98 % | RESPIRATION RATE: 22 BRPM
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
## 2020-01-01 DIAGNOSIS — R41.82 ALTERED MENTAL STATUS, UNSPECIFIED ALTERED MENTAL STATUS TYPE: ICD-10-CM

## 2020-01-01 DIAGNOSIS — R06.00 DYSPNEA, UNSPECIFIED TYPE: ICD-10-CM

## 2020-01-01 PROCEDURE — 99284 EMERGENCY DEPT VISIT MOD MDM: CPT

## 2020-01-01 ASSESSMENT — LIFESTYLE VARIABLES
HAVE PEOPLE ANNOYED YOU BY CRITICIZING YOUR DRINKING: NO
CONSUMPTION TOTAL: INCOMPLETE
HAVE YOU EVER FELT YOU SHOULD CUT DOWN ON YOUR DRINKING: NO
EVER HAD A DRINK FIRST THING IN THE MORNING TO STEADY YOUR NERVES TO GET RID OF A HANGOVER: NO
EVER FELT BAD OR GUILTY ABOUT YOUR DRINKING: NO
TOTAL SCORE: 0
TOTAL SCORE: 0
DO YOU DRINK ALCOHOL: NO
TOTAL SCORE: 0

## 2020-01-01 NOTE — DISCHARGE PLANNING
Spoke with Kimani at [phone] John will be at house to accept patient pateint Brisa arranged for 430 called left message.

## 2020-01-01 NOTE — ED PROVIDER NOTES
"ED Provider Note    CHIEF COMPLAINT  Chief Complaint   Patient presents with   • Fall       HPI  Donato Bc Burciaga is a 89 y.o. male who presents to the emergency department by ambulance with his wife at bedside, from assisted living facility for altered mental status, difficulty ambulating, difficulty breathing.  Wife states she lives in an independent senior living facility attached to patient's assisted living facility.  When she arrived to check on him this morning he was found down on the floor between the bed and the dresser.  Apparently this was not noted by staff overnight or early this morning.  Patient unable to stand or ambulate with walker per routine.  Wife describes increased work of breathing and \"dry lips.\"  Patient denies pain at this time, and does not recall his fall.    Per wife, patient is on hospice \"he has been going downhill for some time.\"  Patient also has a POLST indicating that no resuscitative measures or further work-up is wanted.  Hospice was available today, but does not stay around-the-clock.  Wife is frustrated with patient's care and was actually meeting with an additional facility today for placement.  She does not wish for hospice to be removed, nor does she want further evaluation, labs, imaging today, but rather help caring for him during this process.    REVIEW OF SYSTEMS  See HPI for further details.  Difficult to obtain    PAST MEDICAL HISTORY   has a past medical history of Alcohol use (2016), Arthritis, Cancer (HCC), Cataract, Dental disorder, High cholesterol, History of chemotherapy (last dose 9/2016), History of radiation therapy (last done on 09/28/2016), Hyperlipidemia, mixed (1990), Hypertension, MI (myocardial infarction) (HCC), and Snoring.    SOCIAL HISTORY  Social History     Tobacco Use   • Smoking status: Former Smoker     Packs/day: 1.00     Years: 16.00     Pack years: 16.00     Types: Cigarettes     Last attempt to quit: 1962     Years since quitting: " "58.0   • Smokeless tobacco: Never Used   • Tobacco comment: started at 17   Substance and Sexual Activity   • Alcohol use: No     Alcohol/week: 0.0 oz   • Drug use: No   • Sexual activity: Never     Partners: Female       SURGICAL HISTORY   has a past surgical history that includes ercp in or (N/A, 2/8/2016); florinda by laparoscopy (2/10/2016); gastroscopy-endo (N/A, 7/11/2016); egd w/endoscopic ultrasound (N/A, 7/11/2016); ercp w/rem fb and/or stent chg (N/A, 7/11/2016); ercp w/rem fb and/or stent chg (N/A, 12/5/2016); zzz cardiac cath (02/21/2018); ercp in or (4/19/2018); and ercp in or (N/A, 8/3/2018).    CURRENT MEDICATIONS  Home Medications     Reviewed by Marquita Bettencourt R.N. (Registered Nurse) on 01/01/20 at 1313  Med List Status: Not Addressed   Medication Last Dose Status   amLODIPine (NORVASC) 5 MG Tab  Active   atorvastatin (LIPITOR) 10 MG Tab  Active   Calcium-Magnesium-Vitamin D (CALCIUM 500 PO)  Active   fluticasone (CUTIVATE) 0.05 % cream  Active   lisinopril (PRINIVIL) 20 MG Tab  Active   megestrol (MEGACE) 20 MG Tab  Active   mirtazapine (REMERON) 7.5 MG tablet  Active   therapeutic multivitamin-minerals (THERAGRAN-M) Tab  Active   Vitamin D, Cholecalciferol, 1000 UNITS Cap  Active                ALLERGIES  No Known Allergies    PHYSICAL EXAM  VITAL SIGNS: BP (!) 75/41   Pulse 74   Temp 36.1 °C (97 °F) (Temporal)   Resp (!) 22   Ht 1.727 m (5' 8\")   Wt 53.5 kg (118 lb)   SpO2 98%   BMI 17.94 kg/m²   Pulse ox interpretation: I interpret this pulse ox as normal.  Constitutional: Somnolent but alert and tries to answers questions.  Cachectic.  HENT: Normocephalic.  No cephalhematoma although patient does have some superficial abrasion to the vertex of his scalp.  Bilateral external ears normal, Nose normal.  Dry lips and mucous membranes.    Eyes: Pupils are equal and reactive, Conjunctiva normal.   Neck: Normal range of motion  Cardiovascular: Regular rate and rhythm, no murmurs. Distal " pulses intact.   Thorax & Lungs: Diminished bilaterally normal breath sounds.  Tachypneic without retractions.  No chest wall tenderness, crepitus, hematoma.    Abdomen: Soft, non-distended.  Mild diffuse tenderness to palpation.  No peritonitis.  No palpable or pulsatile mass.  Skin: Warm, Dry, No erythema, No rash.   Musculoskeletal:  No major deformities noted.  Abrasion to knee.  Neurologic: Somnolent but alert.  Whispers few answers to questions.  Moves 4 extremities spontaneously.  Psychiatric: Flat affect.  Unknown baseline.      COURSE & MEDICAL DECISION MAKING  Nursing notes and vital signs were reviewed. (See chart for details)  The patients records were reviewed, history was obtained from the patient's wife;      contacted for assistance with hospice, possible return to facility is wife does not wish for further evaluation but rather request assistance with caring for this patient during end-of-life..    2:27 PM Leeroy, , has contacted me.  Patient has been evaluated by  and case management.  Wife does not wish for patient to return to the facility from which he came but she is arranged for him to go to a different facility that will provide more constant assistance with the help of hospice.  No indication for further ED evaluation at this time per patient/wife request and POLST with hospice previously in place before this transport.  Patient is alert but somewhat dyspneic and hypotensive.  No tachycardia or hypoxia.  No evidence for obvious trauma.    Patient is stable for discharge at this time once placement and hospice confirmed, anticipatory guidance provided, to resume any home medications if warranted, close follow-up is encouraged, and strict ED return instructions have been detailed.  Patient's wife agreeable to the disposition and plan.      FINAL IMPRESSION  (W19.XXXA) Fall, initial encounter  (R41.82) Altered mental status, unspecified altered mental  status type  (R06.00) Dyspnea, unspecified type      Electronically signed by: Colleen Campa, 1/1/2020 2:22 PM      This dictation was created using voice recognition software. The accuracy of the dictation is limited to the abilities of the software. I expect there may be some errors of grammar and possibly content. The nursing notes were reviewed and certain aspects of this information were incorporated into this note.

## 2020-01-01 NOTE — ED TRIAGE NOTES
"Pt arrives via EMS with unwitnessed fall. Pt at [facility] on hospice with POLST. Pt is hypotensive, alert to self and wife. DNR pale cool dry.   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

## 2020-01-01 NOTE — PROGRESS NOTES
Spiritual Care Note    Patient Information     Patient's Name: Donato Burciaga   MRN: [id number]    YOB: 1929   Age and Gender: 89 y.o. male   Service Area: ED C   Room (and Bed):  24/24 [facility]   Ethnicity or Nationality:     Primary Language: English   Latter day/Spiritual preference: Druze   Place of Residence: [place], [place]   Family/Friends/Others Present: Yes, wife   Clinical Team Present: No   Medical Diagnosis(-es)/Procedure(s): Fall   Code Status: Prior    Date of Admission: 1/1/2020   Length of Stay: 0 days        Spiritual Care Provider Information:  Name of Spiritual Care Provider: Arianna Wright  Title of Spiritual Care Provider: Associate   Phone Number: [phone]  E-mail: [email or web address]  Total time : 15 minutes    Spiritual Screen Results:    Gen Nursing        Palliative Care         Encounter/Request Information  Encounter/Request Type   Visited With: Patient and family together  Nature of the Visit: Initial  Continue Visiting: No  General Visit: Yes  Referral From/ Origin of Request: [place] management rounds, Verbal staff    Religous Needs/Values  Latter day Needs Visit  Latter day Needs: Prayer    Spiritual Assessment     Spiritual Care Encounters    Observations/Symptoms: Anxious    Interaction/Conversation: At wife's request,  prayed with wife and pt.  Pt is on hospice and seemed agitated.  He is being transferred to another facility.    Assessment: Need    Need: Family Issues/Concern, Seeking Spiritual Assistance and Support    Interventions: Prayer, conversation.    Outcomes: Spiritual Comfort    Plan: No Further Visits    Notes:

## 2020-01-01 NOTE — DISCHARGE INSTRUCTIONS
Patient may return to appropriate assisted living facility per the request of wife, hospice andPOLST.  No indication for further ED work-up or hospitalization at this time.    Follow-up with primary care tomorrow.    Continue home medications if warranted.

## 2020-01-01 NOTE — ED NOTES
Able to go to [facility], but unable to have transport. [phone]  Kimani from [facility].

## 2022-10-19 NOTE — TELEPHONE ENCOUNTER
Spoke with wife and let her know. She was wondering if he should continue to take the medication until the new one comes from the mail order. She is unable to drive and can't make it to the local pharmacy to  a temp supply. Please advise.   Klisyri Counseling:  I discussed with the patient the risks of Klisyri including but not limited to erythema, scaling, itching, weeping, crusting, and pain.

## 2023-02-14 NOTE — ASSESSMENT & PLAN NOTE
Patient has no chest pains, + ST changes on his EKG by my read. I will continue to trend his troponin levels and monitor him on telemetry.  2/20 Dr. Rodas consulted.  No other reason for elevated troponin other than cardiac.  Patient and wife state will undergo cardiac cath.  Started heparin drip in addition to asa 325 daily since BP too low for nitro or BB.  Remains with near syncope and hypotension only.  H/o AAA.  No obvious pain to suggest dissection.  2/21:  Extreme activity intolerance noted with ambulation 6 ft from BR prior to cath lab.  Due to NSTEMI   Statement Selected

## 2023-04-17 NOTE — ASSESSMENT & PLAN NOTE
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

## 2024-05-11 NOTE — PROCEDURES
DATE OF SERVICE:  08/03/2018    PROCEDURES:  Endoscopic retrograde cholangiopancreatography with:  1.  Bile duct stone and sludge extraction.  2.  Bile duct fully covered metal stent placement.  3.  Radiologic interpretation of static and dynamic fluoroscopic images by   myself, at no time was a radiologist present in the room.    INDICATION:  Bile duct obstruction, history of ampullary malignancy.    FINAL IMPRESSION:  1.  Biliary metal stent seen in place clearly occluded.  2.  Pancreatic duct neither injected nor cannulated.  3.  Common bile duct, occluded metal stent in place, otherwise normal course   and caliber.  Mild tissue ingrowth with a benign appearance.    THERAPEUTICS:  1.  Bile duct balloon stone/sludge/pus extraction.  2.  Bile duct fully covered metal stent placement 10x60 mm.    RECOMMENDATIONS:  1.  Follow liver enzymes.  2.  Supportive care.    DESCRIPTION OF PROCEDURE:  Prior to the procedure, physical exam was stable.    During procedure, vital signs remained within normal limits.  Prior to   sedation, informed consent was obtained.  Risks, benefits, alternatives   including but not limited to risk of bleeding, infection, perforation, adverse   reaction to medication, failure to identify pathology, pancreatitis and death   explained at length to the patient's wife.  She accepted all risks.  The   patient prepped in the prone position after intubation and sedation by   anesthesia.  Scope tip of the Olympus flexible side viewing TJF duodenoscope   passed in the stomach.  Gastric pool suctioned dried and the scope was placed   to the second portion of the duodenum in the short position.  The biliary   ampulla was clearly seen and had an occluded metal stent in place.  This was   washed and suctioned until the orifice was better demarcated.  A balloon   catheter with a 0.035 covered wire was utilized for selective cannulation of   the common bile duct.  This was achieved on the very first attempt.   An air   cholangiogram was observed on fluoroscopy confirming placement of the wire.    This was followed part way by a 9-12 extraction balloon into the distal bile   duct, inflated to 12 mm and slowly withdrawn down with delivery of soft stone   debris, sludge and a small amount of pus.  Repeat sweeps were made, marching   more proximally until the metal biliary stent was cleared and it was seen that   there was some tissue ingrowth through the head of the pancreas portion, but   this had a benign appearance.  Further sweeps were then made, marching more   proximally.  There was extensive air in the duct, limiting the view.  The bile   duct had normal course and caliber otherwise and the intrahepatic biliary   tree was grossly normal.  The wire was left in place and given the patient's   age, need for repeated endoscopies and need for frequent cleaning of the   stent, it was felt that a fully covered metal stent might give longer   durability, so a 60x10 mm fully covered [employer] metal stent was   chosen and deployed in the usual fashion under fluoroscopic and direct   visualization.  Excellent placement was observed.  Procedure was deemed   complete.  At this time, the scope was withdrawn.  Air and liquid were   suctioned.  The patient tolerated the procedure well and was sent to recovery   without immediate complications.       ____________________________________     MD NASIM MERCADO / BRIDGET    DD:  08/03/2018 14:14:42  DT:  08/03/2018 14:24:22    D#:  [id number]  Job#:  [id number]   angel ramos pa-c

## 2024-05-16 NOTE — PROGRESS NOTES
"Subjective:   Donato \"Ed\"  Bc Burciaga is a 88 y.o. male who presents today with his colby wife, June, for hospital follow-up. He had a near syncopal episode and was taken to the hospital where he was found to have positive troponins. He was taken to the Cath Lab and was found to have a 98% occluded LAD. No stent was able to be placed but PTCA was done with a residual 40% stenosis.    Since he has residual stenosis, it was felt that if he had anginal symptoms he could undergo a Rotary atherectomy in the future if needed.    Since being out of the hospital, he states he feels well except he tires easily. His wife states the patient does not move very much and mostly sits. He is using a walker for stability.    He denies shortness of breath, chest pain or palpitations. He does have some minor ankle edema particularly in the afternoons. He feels lightheaded with position change.    He has a history of a slight elevation in his liver enzymes. He has been referred to gastroenterology. His liver enzymes came down to almost normal prior to to his hospital discharge.    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   • ERCP W/REM FB AND/OR STENT CHG N/A 12/5/2016    Procedure: ERCP W/REM FB AND/OR STENT CHG - PYLORIC STENT REMOVAL SWAP METAL STENT PLACEMENT;  Surgeon: Sunny Grove M.D.;  Location: SURGERY [facility];  Service:    • GASTROSCOPY-ENDO N/A 7/11/2016    Procedure: GASTROSCOPY-ENDO;  Surgeon: Sunny Grove M.D.;  Location: " Medication: amlodipine  Medication refill denied. Refills are on file at pharmacy.    RX was sent in for a year supply 2/20/24   SURGERY [facility];  Service:    • EGD W/ENDOSCOPIC ULTRASOUND N/A 7/11/2016    Procedure: EGD W/ENDOSCOPIC ULTRASOUND - UPPER LINEAR;  Surgeon: Sunny Grove M.D.;  Location: [facility];  Service:    • ERCP W/REM FB AND/OR STENT CHG N/A 7/11/2016    Procedure: ERCP W/REM FB AND/OR STENT CHG - STENT EXCHANGE;  Surgeon: Sunny Grove M.D.;  Location: [facility];  Service:    • DIAMANTE BY LAPAROSCOPY  2/10/2016    Procedure: DIAMANTE BY LAPAROSCOPY;  Surgeon: Elver Turcios M.D.;  Location: [facility];  Service:    • ERCP IN OR N/A 2/8/2016    Procedure: ERCP IN OR;  Surgeon: Sunny Grove M.D.;  Location: [facility];  Service:      Family History   Problem Relation Age of Onset   • No Known Problems Mother      History   Smoking Status   • Former Smoker   • Years: 0.00   • Types: Cigarettes   • Quit date: 1/1/1960   Smokeless Tobacco   • Never Used     No Known Allergies  Outpatient Encounter Prescriptions as of 3/8/2018   Medication Sig Dispense Refill   • atorvastatin (LIPITOR) 10 MG Tab Take 1 Tab by mouth every evening. 90 Tab 3   • losartan (COZAAR) 25 MG Tab Take 1 Tab by mouth every day. 90 Tab 3   • metoprolol SR (TOPROL XL) 25 MG TABLET SR 24 HR Take 0.5 Tabs by mouth every day. 90 Tab 3   • potassium chloride SA (KDUR) 20 MEQ Tab CR Take 1 Tab by mouth every day. 90 Tab 3   • clopidogrel (PLAVIX) 75 MG Tab Take 1 Tab by mouth every day. 90 Tab 3   • megestrol (MEGACE) 20 MG Tab Take 1 Tab by mouth every day. 30 Tab 3   • aspirin EC 81 MG EC tablet Take 1 Tab by mouth every day. 30 Tab 3   • Omega-3 Fatty Acids (FISH OIL) 1000 MG Cap capsule Take 2 Caps by mouth every day. 60 Cap 3   • fluticasone (FLONASE) 50 MCG/ACT nasal spray Spray 1 Spray in nose every day. 3 Bottle 3   • terazosin (HYTRIN) 5 MG Cap TAKE 1 CAPSULE DAILY 90 Cap 3   • MAGNESIUM PO Take 250 mg by mouth 1 time daily as needed (supplement).     • Vitamin D, Cholecalciferol, 1000 UNITS Cap  "Take 2,000 Units by mouth every day at 6 PM.     • loratadine (CLARITIN) 10 MG Tab Take 10 mg by mouth every day.     • [DISCONTINUED] atorvastatin (LIPITOR) 10 MG Tab Take 1 Tab by mouth every bedtime. 90 Tab 3   • [DISCONTINUED] losartan (COZAAR) 50 MG Tab Take 1 Tab by mouth every day. 30 Tab 3   • [DISCONTINUED] metoprolol SR (TOPROL XL) 25 MG TABLET SR 24 HR Take 0.5 Tabs by mouth every day. 30 Tab 3   • [DISCONTINUED] potassium chloride SA (KDUR) 20 MEQ Tab CR Take 1 Tab by mouth every day. 30 Tab 0     No facility-administered encounter medications on file as of 3/8/2018.      Review of Systems   Constitutional: Positive for malaise/fatigue (tires easily.).   Respiratory: Negative for cough and shortness of breath.    Cardiovascular: Negative for chest pain, palpitations, orthopnea, claudication, leg swelling and PND.   Gastrointestinal: Negative for abdominal pain.   Musculoskeletal: Negative for myalgias.   Neurological: Positive for dizziness (lightheaded with position change.). Negative for weakness.        Objective:   BP (!) 98/52   Pulse 80   Ht 1.727 m (5' 8\")   Wt 61.2 kg (135 lb)   SpO2 96%   BMI 20.53 kg/m²     Physical Exam   Constitutional: He is oriented to person, place, and time. He appears well-developed and well-nourished.   HENT:   Head: Normocephalic.   Eyes: Conjunctivae are normal.   Neck: No JVD present. No thyromegaly present.   Cardiovascular: Normal rate, regular rhythm and normal heart sounds.    Pulmonary/Chest: Effort normal and breath sounds normal. He has no wheezes. He has no rales.   Abdominal: Soft. Bowel sounds are normal. He exhibits no distension. There is no tenderness.   Musculoskeletal: He exhibits edema (trace bilateral ankles.).   Neurological: He is alert and oriented to person, place, and time.   Skin: Skin is warm and dry.   Psychiatric: He has a normal mood and affect.     Results for ADRIANA DILMA CHENG (MRN [id number]) as of 3/8/2018 10:02   Ref. Range " 2/22/2018 01:33   WBC Latest Ref Range: 4.8 - 10.8 K/uL 6.1   RBC Latest Ref Range: 4.70 - 6.10 M/uL 3.57 (L)   Hemoglobin Latest Ref Range: 14.0 - 18.0 g/dL 10.6 (L)   Hematocrit Latest Ref Range: 42.0 - 52.0 % 32.4 (L)   MCV Latest Ref Range: 81.4 - 97.8 fL 90.8   MCH Latest Ref Range: 27.0 - 33.0 pg 29.7   MCHC Latest Ref Range: 33.7 - 35.3 g/dL 32.7 (L)   RDW Latest Ref Range: 35.9 - 50.0 fL 46.7   Platelet Count Latest Ref Range: 164 - 446 K/uL 108 (L)   MPV Latest Ref Range: 9.0 - 12.9 fL 10.0   Neutrophils-Polys Latest Ref Range: 44.00 - 72.00 % 92.10 (H)   Neutrophils (Absolute) Latest Ref Range: 1.82 - 7.42 K/uL 5.62   Lymphocytes Latest Ref Range: 22.00 - 41.00 % 4.40 (L)   Lymphs (Absolute) Latest Ref Range: 1.00 - 4.80 K/uL 0.27 (L)   Monocytes Latest Ref Range: 0.00 - 13.40 % 2.60   Monos (Absolute) Latest Ref Range: 0.00 - 0.85 K/uL 0.16   Eosinophils Latest Ref Range: 0.00 - 6.90 % 0.90   Eos (Absolute) Latest Ref Range: 0.00 - 0.51 K/uL 0.05   Basophils Latest Ref Range: 0.00 - 1.80 % 0.00   Baso (Absolute) Latest Ref Range: 0.00 - 0.12 K/uL 0.00   Nucleated RBC Latest Units: /100 WBC 0.00   NRBC (Absolute) Latest Units: K/uL 0.00   Plt Estimation Unknown Decreased   RBC Morphology Unknown Normal   Peripheral Smear Review Unknown see below   Manual Diff Status Unknown PERFORMED   Sodium Latest Ref Range: 135 - 145 mmol/L 140   Potassium Latest Ref Range: 3.6 - 5.5 mmol/L 3.5 (L)   Chloride Latest Ref Range: 96 - 112 mmol/L 112   Co2 Latest Ref Range: 20 - 33 mmol/L 22   Anion Gap Latest Ref Range: 0.0 - 11.9  6.0   Glucose Latest Ref Range: 65 - 99 mg/dL 87   Bun Latest Ref Range: 8 - 22 mg/dL 16   Creatinine Latest Ref Range: 0.50 - 1.40 mg/dL 0.64   GFR If  Latest Ref Range: >60 mL/min/1.73 m 2 >60   GFR If Non  Latest Ref Range: >60 mL/min/1.73 m 2 >60   Calcium Latest Ref Range: 8.5 - 10.5 mg/dL 7.9 (L)   AST(SGOT) Latest Ref Range: 12 - 45 U/L 34   ALT(SGPT)  Latest Ref Range: 2 - 50 U/L 52 (H)   Alkaline Phosphatase Latest Ref Range: 30 - 99 U/L 145 (H)   Total Bilirubin Latest Ref Range: 0.1 - 1.5 mg/dL 0.9   Albumin Latest Ref Range: 3.2 - 4.9 g/dL 2.8 (L)   Total Protein Latest Ref Range: 6.0 - 8.2 g/dL 5.0 (L)   Globulin Latest Ref Range: 1.9 - 3.5 g/dL 2.2   A-G Ratio Latest Units: g/dL 1.3       February 20, 2018: Transthoracic Echo Report  No prior study is available for comparison.   Normal left ventricular chamber size.  Left ventricular ejection fraction is visually estimated to be 65%.  Apical anteroseptal hypokinesis.  Grade II diastolic dysfunction.  Left atrial enlargement with volume index 40.  Aortic sclerosis without stenosis.  Mild aortic insufficiency.  Right heart pressures are normal.     Assessment:     1. Coronary artery disease involving native coronary artery of native heart without angina pectoris     2. Essential hypertension     3. Dyslipidemia  COMP METABOLIC PANEL    LIPID PROFILE   4. LFTs abnormal         Medical Decision Making:  Today's Assessment / Status / Plan:     Coronary artery disease: Status post PTCA to his LAD. He has 40% residual stenosis. No anginal symptoms. If he were to develop chest discomfort or other symptoms of angina we would consider Rotary atherectomy. He will be on dual antiplatelet therapy for one year.    Hypertension: His blood pressure is low in the office today. I do not want him to become lightheaded and fall. I will reduce losartan to 25 mg daily.    Dyslipidemia: He's been started on a statin. We'll check lipids and metabolic panel in 3 months.    Elevated liver enzymes: He has a minor elevation of LFTs. His liver enzymes are almost back to normal and the patient feels fine. He does not want to follow-up with gastroenterology unless he feels poorly.    He is stable enough to follow-up in 3 months withDr Rodas sooner if problems.    Collaborating Provider: Dr. Gabe Brown.

## (undated) DEVICE — CONTAINER, SPECIMEN, STERILE

## (undated) DEVICE — SYRINGE 30 ML LS (56/BX)

## (undated) DEVICE — HEAD HOLDER JUNIOR/ADULT

## (undated) DEVICE — MASK ANESTHESIA ADULT  - (100/CA)

## (undated) DEVICE — KIT ANESTHESIA W/CIRCUIT & 3/LT BAG W/FILTER (20EA/CA)

## (undated) DEVICE — EXTRACTOR PRO XL 9-12 MM ABOVE

## (undated) DEVICE — SUCTION INSTRUMENT YANKAUER BULBOUS TIP W/O VENT (50EA/CA)

## (undated) DEVICE — CANISTER SUCTION 3000ML MECHANICAL FILTER AUTO SHUTOFF MEDI-VAC NONSTERILE LF DISP  (40EA/CA)

## (undated) DEVICE — BITE BLOCK ADULT 60FR (100EA/CA)

## (undated) DEVICE — NEPTUNE 4 PORT MANIFOLD - (20/PK)

## (undated) DEVICE — WIRE JAG .035/450 SUPER STIFF (2EA/BX)

## (undated) DEVICE — TUBE E-T HI-LO CUFF 7.0MM (10EA/PK)

## (undated) DEVICE — CAPTIVATOR II-25MM ROUND STIFF  (40/BX)

## (undated) DEVICE — FILM CASSETTE ENDO

## (undated) DEVICE — TUBE E-T HI-LO CUFF 7.5MM (10EA/PK)

## (undated) DEVICE — SPONGE GAUZE NON-STERILE 4X4 - (2000/CA 10PK/CA)

## (undated) DEVICE — SYRINGE DISP. 12 CC LL - (100/BX)

## (undated) DEVICE — PROTECTOR ULNA NERVE - (36PR/CA)

## (undated) DEVICE — KIT CUSTOM PROCEDURE SINGLE FOR ENDO  (15/CA)

## (undated) DEVICE — GUIDE JAGWIRE 035 STRAIGHT (2EA/BX)